# Patient Record
Sex: MALE | Race: WHITE | Employment: OTHER | URBAN - METROPOLITAN AREA
[De-identification: names, ages, dates, MRNs, and addresses within clinical notes are randomized per-mention and may not be internally consistent; named-entity substitution may affect disease eponyms.]

---

## 2017-01-06 ENCOUNTER — HOSPITAL ENCOUNTER (OUTPATIENT)
Dept: PULMONOLOGY | Age: 72
Discharge: HOME | End: 2017-01-06
Attending: INTERNAL MEDICINE | Admitting: INTERNAL MEDICINE

## 2017-01-06 VITALS — HEART RATE: 63 BPM | OXYGEN SATURATION: 96 %

## 2017-01-06 DIAGNOSIS — R06.02 SHORTNESS OF BREATH: ICD-10-CM

## 2017-01-06 RX ORDER — ALBUTEROL SULFATE 90 UG/1
4 AEROSOL, METERED RESPIRATORY (INHALATION) ONCE
Status: COMPLETED | OUTPATIENT
Start: 2017-01-06 | End: 2017-01-06

## 2017-01-06 RX ADMIN — ALBUTEROL SULFATE 4 PUFF: 90 AEROSOL, METERED RESPIRATORY (INHALATION) at 14:58

## 2017-02-09 ENCOUNTER — HOSPITAL ENCOUNTER (OUTPATIENT)
Dept: ENDOSCOPY | Age: 72
Discharge: HOME | End: 2017-02-09
Attending: INTERNAL MEDICINE | Admitting: INTERNAL MEDICINE

## 2017-02-09 RX ORDER — SODIUM CHLORIDE 9 MG/ML
INJECTION, SOLUTION INTRAVENOUS CONTINUOUS
Status: DISCONTINUED | OUTPATIENT
Start: 2017-02-09 | End: 2017-02-10 | Stop reason: HOSPADM

## 2017-02-09 RX ORDER — SODIUM CHLORIDE 0.9 % (FLUSH) 0.9 %
10 SYRINGE (ML) INJECTION PRN
Status: DISCONTINUED | OUTPATIENT
Start: 2017-02-09 | End: 2017-02-10 | Stop reason: HOSPADM

## 2017-02-09 RX ORDER — SODIUM CHLORIDE 0.9 % (FLUSH) 0.9 %
10 SYRINGE (ML) INJECTION EVERY 12 HOURS SCHEDULED
Status: DISCONTINUED | OUTPATIENT
Start: 2017-02-09 | End: 2017-02-10 | Stop reason: HOSPADM

## 2017-04-18 ENCOUNTER — OFFICE VISIT (OUTPATIENT)
Dept: INTERNAL MEDICINE CLINIC | Age: 72
End: 2017-04-18

## 2017-04-18 VITALS
WEIGHT: 240 LBS | RESPIRATION RATE: 16 BRPM | BODY MASS INDEX: 36.37 KG/M2 | HEIGHT: 68 IN | SYSTOLIC BLOOD PRESSURE: 120 MMHG | DIASTOLIC BLOOD PRESSURE: 80 MMHG | HEART RATE: 80 BPM

## 2017-04-18 DIAGNOSIS — E78.5 DYSLIPIDEMIA: ICD-10-CM

## 2017-04-18 DIAGNOSIS — R73.01 IMPAIRED FASTING BLOOD SUGAR: ICD-10-CM

## 2017-04-18 DIAGNOSIS — M19.041 PRIMARY OSTEOARTHRITIS OF BOTH HANDS: ICD-10-CM

## 2017-04-18 DIAGNOSIS — I10 BENIGN ESSENTIAL HTN: Primary | ICD-10-CM

## 2017-04-18 DIAGNOSIS — K21.9 GASTROESOPHAGEAL REFLUX DISEASE WITHOUT ESOPHAGITIS: ICD-10-CM

## 2017-04-18 DIAGNOSIS — M19.042 PRIMARY OSTEOARTHRITIS OF BOTH HANDS: ICD-10-CM

## 2017-04-18 DIAGNOSIS — E88.81 METABOLIC SYNDROME: ICD-10-CM

## 2017-04-18 PROCEDURE — G8417 CALC BMI ABV UP PARAM F/U: HCPCS | Performed by: INTERNAL MEDICINE

## 2017-04-18 PROCEDURE — G8428 CUR MEDS NOT DOCUMENT: HCPCS | Performed by: INTERNAL MEDICINE

## 2017-04-18 PROCEDURE — 1036F TOBACCO NON-USER: CPT | Performed by: INTERNAL MEDICINE

## 2017-04-18 PROCEDURE — 4040F PNEUMOC VAC/ADMIN/RCVD: CPT | Performed by: INTERNAL MEDICINE

## 2017-04-18 PROCEDURE — 1123F ACP DISCUSS/DSCN MKR DOCD: CPT | Performed by: INTERNAL MEDICINE

## 2017-04-18 PROCEDURE — 3017F COLORECTAL CA SCREEN DOC REV: CPT | Performed by: INTERNAL MEDICINE

## 2017-04-18 PROCEDURE — 99214 OFFICE O/P EST MOD 30 MIN: CPT | Performed by: INTERNAL MEDICINE

## 2017-04-19 LAB
A/G RATIO: 1.6 (ref 1.1–2.2)
ALBUMIN SERPL-MCNC: 4.6 G/DL (ref 3.4–5)
ALP BLD-CCNC: 92 U/L (ref 40–129)
ALT SERPL-CCNC: 29 U/L (ref 10–40)
ANION GAP SERPL CALCULATED.3IONS-SCNC: 20 MMOL/L (ref 3–16)
AST SERPL-CCNC: 23 U/L (ref 15–37)
BILIRUB SERPL-MCNC: 0.5 MG/DL (ref 0–1)
BUN BLDV-MCNC: 13 MG/DL (ref 7–20)
CALCIUM SERPL-MCNC: 9.7 MG/DL (ref 8.3–10.6)
CHLORIDE BLD-SCNC: 106 MMOL/L (ref 99–110)
CHOLESTEROL, TOTAL: 200 MG/DL (ref 0–199)
CO2: 21 MMOL/L (ref 21–32)
CREAT SERPL-MCNC: 1 MG/DL (ref 0.8–1.3)
ESTIMATED AVERAGE GLUCOSE: 131.2 MG/DL
GFR AFRICAN AMERICAN: >60
GFR NON-AFRICAN AMERICAN: >60
GLOBULIN: 2.8 G/DL
GLUCOSE BLD-MCNC: 104 MG/DL (ref 70–99)
HBA1C MFR BLD: 6.2 %
HDLC SERPL-MCNC: 54 MG/DL (ref 40–60)
LDL CHOLESTEROL CALCULATED: 103 MG/DL
POTASSIUM SERPL-SCNC: 3.9 MMOL/L (ref 3.5–5.1)
SODIUM BLD-SCNC: 147 MMOL/L (ref 136–145)
TOTAL PROTEIN: 7.4 G/DL (ref 6.4–8.2)
TRIGL SERPL-MCNC: 216 MG/DL (ref 0–150)
TSH SERPL DL<=0.05 MIU/L-ACNC: 1.51 UIU/ML (ref 0.27–4.2)
VLDLC SERPL CALC-MCNC: 43 MG/DL

## 2017-06-02 RX ORDER — PANTOPRAZOLE SODIUM 40 MG/1
40 TABLET, DELAYED RELEASE ORAL DAILY
Qty: 90 TABLET | Refills: 3 | Status: SHIPPED | OUTPATIENT
Start: 2017-06-02 | End: 2017-07-26 | Stop reason: ALTCHOICE

## 2017-07-26 ENCOUNTER — TELEPHONE (OUTPATIENT)
Dept: INTERNAL MEDICINE CLINIC | Age: 72
End: 2017-07-26

## 2017-07-26 RX ORDER — OMEPRAZOLE 40 MG/1
40 CAPSULE, DELAYED RELEASE ORAL DAILY
Qty: 90 CAPSULE | Refills: 3 | Status: SHIPPED | OUTPATIENT
Start: 2017-07-26 | End: 2018-08-17 | Stop reason: SDUPTHER

## 2017-07-26 RX ORDER — OMEPRAZOLE 40 MG/1
40 CAPSULE, DELAYED RELEASE ORAL DAILY
Qty: 90 CAPSULE | Refills: 3 | Status: SHIPPED | OUTPATIENT
Start: 2017-07-26 | End: 2017-07-26 | Stop reason: SDUPTHER

## 2017-09-05 ENCOUNTER — OFFICE VISIT (OUTPATIENT)
Dept: INTERNAL MEDICINE CLINIC | Age: 72
End: 2017-09-05

## 2017-09-05 ENCOUNTER — TELEPHONE (OUTPATIENT)
Dept: INTERNAL MEDICINE CLINIC | Age: 72
End: 2017-09-05

## 2017-09-05 VITALS
HEART RATE: 58 BPM | DIASTOLIC BLOOD PRESSURE: 82 MMHG | WEIGHT: 239.8 LBS | BODY MASS INDEX: 36.46 KG/M2 | SYSTOLIC BLOOD PRESSURE: 113 MMHG

## 2017-09-05 DIAGNOSIS — R10.10 UPPER ABDOMINAL PAIN: ICD-10-CM

## 2017-09-05 DIAGNOSIS — G47.33 OSA (OBSTRUCTIVE SLEEP APNEA): ICD-10-CM

## 2017-09-05 DIAGNOSIS — H26.9 CATARACT OF BOTH EYES, UNSPECIFIED CATARACT TYPE: ICD-10-CM

## 2017-09-05 DIAGNOSIS — E78.5 DYSLIPIDEMIA: ICD-10-CM

## 2017-09-05 DIAGNOSIS — R73.01 IMPAIRED FASTING BLOOD SUGAR: ICD-10-CM

## 2017-09-05 DIAGNOSIS — E88.81 METABOLIC SYNDROME: ICD-10-CM

## 2017-09-05 DIAGNOSIS — Z23 FLU VACCINE NEED: ICD-10-CM

## 2017-09-05 DIAGNOSIS — K21.9 GASTROESOPHAGEAL REFLUX DISEASE WITHOUT ESOPHAGITIS: ICD-10-CM

## 2017-09-05 DIAGNOSIS — I10 BENIGN ESSENTIAL HTN: ICD-10-CM

## 2017-09-05 DIAGNOSIS — Z01.818 PREOP EXAM FOR INTERNAL MEDICINE: Primary | ICD-10-CM

## 2017-09-05 PROBLEM — G47.00 INSOMNIA: Status: ACTIVE | Noted: 2017-09-05

## 2017-09-05 PROBLEM — K85.90 PANCREATITIS: Status: ACTIVE | Noted: 2017-09-05

## 2017-09-05 PROBLEM — K80.20 GALLSTONES: Status: ACTIVE | Noted: 2017-09-05

## 2017-09-05 PROBLEM — E78.00 HIGH CHOLESTEROL: Status: ACTIVE | Noted: 2017-09-05

## 2017-09-05 LAB
A/G RATIO: 1.5 (ref 1.1–2.2)
ALBUMIN SERPL-MCNC: 4.4 G/DL (ref 3.4–5)
ALP BLD-CCNC: 173 U/L (ref 40–129)
ALT SERPL-CCNC: 204 U/L (ref 10–40)
AMYLASE: 98 U/L (ref 25–115)
ANION GAP SERPL CALCULATED.3IONS-SCNC: 17 MMOL/L (ref 3–16)
AST SERPL-CCNC: 281 U/L (ref 15–37)
BASOPHILS ABSOLUTE: 0.1 K/UL (ref 0–0.2)
BASOPHILS RELATIVE PERCENT: 0.6 %
BILIRUB SERPL-MCNC: 1.7 MG/DL (ref 0–1)
BUN BLDV-MCNC: 14 MG/DL (ref 7–20)
CALCIUM SERPL-MCNC: 10 MG/DL (ref 8.3–10.6)
CHLORIDE BLD-SCNC: 105 MMOL/L (ref 99–110)
CHOLESTEROL, TOTAL: 162 MG/DL (ref 0–199)
CO2: 21 MMOL/L (ref 21–32)
CREAT SERPL-MCNC: 1.1 MG/DL (ref 0.8–1.3)
EOSINOPHILS ABSOLUTE: 0.3 K/UL (ref 0–0.6)
EOSINOPHILS RELATIVE PERCENT: 3.2 %
GFR AFRICAN AMERICAN: >60
GFR NON-AFRICAN AMERICAN: >60
GLOBULIN: 2.9 G/DL
GLUCOSE BLD-MCNC: 153 MG/DL (ref 70–99)
HCT VFR BLD CALC: 46.5 % (ref 40.5–52.5)
HDLC SERPL-MCNC: 50 MG/DL (ref 40–60)
HEMOGLOBIN: 15.5 G/DL (ref 13.5–17.5)
LDL CHOLESTEROL CALCULATED: 85 MG/DL
LIPASE: >600 U/L (ref 13–60)
LYMPHOCYTES ABSOLUTE: 2.1 K/UL (ref 1–5.1)
LYMPHOCYTES RELATIVE PERCENT: 22.2 %
MCH RBC QN AUTO: 31.5 PG (ref 26–34)
MCHC RBC AUTO-ENTMCNC: 33.4 G/DL (ref 31–36)
MCV RBC AUTO: 94.4 FL (ref 80–100)
MONOCYTES ABSOLUTE: 0.6 K/UL (ref 0–1.3)
MONOCYTES RELATIVE PERCENT: 6.5 %
NEUTROPHILS ABSOLUTE: 6.3 K/UL (ref 1.7–7.7)
NEUTROPHILS RELATIVE PERCENT: 67.5 %
PDW BLD-RTO: 14.1 % (ref 12.4–15.4)
PLATELET # BLD: 241 K/UL (ref 135–450)
PMV BLD AUTO: 10.3 FL (ref 5–10.5)
POTASSIUM SERPL-SCNC: 4.3 MMOL/L (ref 3.5–5.1)
RBC # BLD: 4.93 M/UL (ref 4.2–5.9)
SODIUM BLD-SCNC: 143 MMOL/L (ref 136–145)
TOTAL PROTEIN: 7.3 G/DL (ref 6.4–8.2)
TRIGL SERPL-MCNC: 137 MG/DL (ref 0–150)
VLDLC SERPL CALC-MCNC: 27 MG/DL
WBC # BLD: 9.4 K/UL (ref 4–11)

## 2017-09-05 PROCEDURE — 99214 OFFICE O/P EST MOD 30 MIN: CPT | Performed by: INTERNAL MEDICINE

## 2017-09-05 PROCEDURE — 90662 IIV NO PRSV INCREASED AG IM: CPT | Performed by: INTERNAL MEDICINE

## 2017-09-05 PROCEDURE — 3017F COLORECTAL CA SCREEN DOC REV: CPT | Performed by: INTERNAL MEDICINE

## 2017-09-05 PROCEDURE — 93000 ELECTROCARDIOGRAM COMPLETE: CPT | Performed by: INTERNAL MEDICINE

## 2017-09-05 PROCEDURE — 1123F ACP DISCUSS/DSCN MKR DOCD: CPT | Performed by: INTERNAL MEDICINE

## 2017-09-05 PROCEDURE — G8427 DOCREV CUR MEDS BY ELIG CLIN: HCPCS | Performed by: INTERNAL MEDICINE

## 2017-09-05 PROCEDURE — G0008 ADMIN INFLUENZA VIRUS VAC: HCPCS | Performed by: INTERNAL MEDICINE

## 2017-09-05 PROCEDURE — 1036F TOBACCO NON-USER: CPT | Performed by: INTERNAL MEDICINE

## 2017-09-05 PROCEDURE — 4040F PNEUMOC VAC/ADMIN/RCVD: CPT | Performed by: INTERNAL MEDICINE

## 2017-09-05 PROCEDURE — G8417 CALC BMI ABV UP PARAM F/U: HCPCS | Performed by: INTERNAL MEDICINE

## 2017-09-06 LAB
ESTIMATED AVERAGE GLUCOSE: 151.3 MG/DL
HBA1C MFR BLD: 6.9 %

## 2017-09-18 ENCOUNTER — OFFICE VISIT (OUTPATIENT)
Dept: INTERNAL MEDICINE CLINIC | Age: 72
End: 2017-09-18

## 2017-09-18 VITALS
RESPIRATION RATE: 16 BRPM | BODY MASS INDEX: 35.28 KG/M2 | DIASTOLIC BLOOD PRESSURE: 79 MMHG | HEART RATE: 60 BPM | WEIGHT: 232 LBS | SYSTOLIC BLOOD PRESSURE: 112 MMHG

## 2017-09-18 DIAGNOSIS — H26.9 CATARACT OF BOTH EYES, UNSPECIFIED CATARACT TYPE: ICD-10-CM

## 2017-09-18 DIAGNOSIS — Z90.49 S/P CHOLECYSTECTOMY: ICD-10-CM

## 2017-09-18 DIAGNOSIS — K85.10 BILIARY ACUTE PANCREATITIS WITHOUT NECROSIS OR INFECTION: Primary | ICD-10-CM

## 2017-09-18 PROCEDURE — 1036F TOBACCO NON-USER: CPT | Performed by: INTERNAL MEDICINE

## 2017-09-18 PROCEDURE — 3017F COLORECTAL CA SCREEN DOC REV: CPT | Performed by: INTERNAL MEDICINE

## 2017-09-18 PROCEDURE — 4040F PNEUMOC VAC/ADMIN/RCVD: CPT | Performed by: INTERNAL MEDICINE

## 2017-09-18 PROCEDURE — 1123F ACP DISCUSS/DSCN MKR DOCD: CPT | Performed by: INTERNAL MEDICINE

## 2017-09-18 PROCEDURE — 99213 OFFICE O/P EST LOW 20 MIN: CPT | Performed by: INTERNAL MEDICINE

## 2017-09-18 PROCEDURE — G8428 CUR MEDS NOT DOCUMENT: HCPCS | Performed by: INTERNAL MEDICINE

## 2017-09-18 PROCEDURE — G8417 CALC BMI ABV UP PARAM F/U: HCPCS | Performed by: INTERNAL MEDICINE

## 2017-09-18 PROCEDURE — 1111F DSCHRG MED/CURRENT MED MERGE: CPT | Performed by: INTERNAL MEDICINE

## 2017-09-18 RX ORDER — HYDROCODONE BITARTRATE AND ACETAMINOPHEN 5; 325 MG/1; MG/1
1 TABLET ORAL EVERY 6 HOURS PRN
COMMUNITY
End: 2018-02-09 | Stop reason: ALTCHOICE

## 2017-09-20 ENCOUNTER — OFFICE VISIT (OUTPATIENT)
Dept: SURGERY | Age: 72
End: 2017-09-20

## 2017-09-20 VITALS — SYSTOLIC BLOOD PRESSURE: 122 MMHG | DIASTOLIC BLOOD PRESSURE: 78 MMHG

## 2017-09-20 DIAGNOSIS — K80.20 GALLSTONES: Primary | ICD-10-CM

## 2017-09-20 PROCEDURE — 99024 POSTOP FOLLOW-UP VISIT: CPT | Performed by: SURGERY

## 2017-12-06 ENCOUNTER — OFFICE VISIT (OUTPATIENT)
Dept: INTERNAL MEDICINE CLINIC | Age: 72
End: 2017-12-06

## 2017-12-06 VITALS
BODY MASS INDEX: 35.77 KG/M2 | HEART RATE: 52 BPM | DIASTOLIC BLOOD PRESSURE: 70 MMHG | HEIGHT: 68 IN | RESPIRATION RATE: 16 BRPM | SYSTOLIC BLOOD PRESSURE: 130 MMHG | WEIGHT: 236 LBS

## 2017-12-06 DIAGNOSIS — L29.9 ITCHING: ICD-10-CM

## 2017-12-06 DIAGNOSIS — E78.5 DYSLIPIDEMIA: ICD-10-CM

## 2017-12-06 DIAGNOSIS — E88.81 METABOLIC SYNDROME: ICD-10-CM

## 2017-12-06 DIAGNOSIS — R73.01 IMPAIRED FASTING BLOOD SUGAR: ICD-10-CM

## 2017-12-06 DIAGNOSIS — L98.9 SKIN LESION OF RIGHT LEG: ICD-10-CM

## 2017-12-06 DIAGNOSIS — K21.9 GASTROESOPHAGEAL REFLUX DISEASE WITHOUT ESOPHAGITIS: ICD-10-CM

## 2017-12-06 DIAGNOSIS — Z12.5 SCREENING FOR PROSTATE CANCER: ICD-10-CM

## 2017-12-06 DIAGNOSIS — I10 BENIGN ESSENTIAL HTN: Primary | ICD-10-CM

## 2017-12-06 DIAGNOSIS — Z23 NEED FOR TDAP VACCINATION: ICD-10-CM

## 2017-12-06 PROBLEM — K80.20 GALLSTONES: Status: RESOLVED | Noted: 2017-09-05 | Resolved: 2017-12-06

## 2017-12-06 PROBLEM — K85.90 PANCREATITIS: Status: RESOLVED | Noted: 2017-09-05 | Resolved: 2017-12-06

## 2017-12-06 LAB
A/G RATIO: 1.5 (ref 1.1–2.2)
ALBUMIN SERPL-MCNC: 4.4 G/DL (ref 3.4–5)
ALP BLD-CCNC: 98 U/L (ref 40–129)
ALT SERPL-CCNC: 42 U/L (ref 10–40)
ANION GAP SERPL CALCULATED.3IONS-SCNC: 15 MMOL/L (ref 3–16)
AST SERPL-CCNC: 39 U/L (ref 15–37)
BILIRUB SERPL-MCNC: 0.5 MG/DL (ref 0–1)
BUN BLDV-MCNC: 13 MG/DL (ref 7–20)
CALCIUM SERPL-MCNC: 10 MG/DL (ref 8.3–10.6)
CHLORIDE BLD-SCNC: 105 MMOL/L (ref 99–110)
CHOLESTEROL, TOTAL: 168 MG/DL (ref 0–199)
CO2: 24 MMOL/L (ref 21–32)
CREAT SERPL-MCNC: 1 MG/DL (ref 0.8–1.3)
GFR AFRICAN AMERICAN: >60
GFR NON-AFRICAN AMERICAN: >60
GLOBULIN: 3 G/DL
GLUCOSE BLD-MCNC: 109 MG/DL (ref 70–99)
HDLC SERPL-MCNC: 56 MG/DL (ref 40–60)
LDL CHOLESTEROL CALCULATED: 78 MG/DL
POTASSIUM SERPL-SCNC: 4.8 MMOL/L (ref 3.5–5.1)
PROSTATE SPECIFIC ANTIGEN: 1.47 NG/ML (ref 0–4)
SODIUM BLD-SCNC: 144 MMOL/L (ref 136–145)
TOTAL PROTEIN: 7.4 G/DL (ref 6.4–8.2)
TRIGL SERPL-MCNC: 169 MG/DL (ref 0–150)
VLDLC SERPL CALC-MCNC: 34 MG/DL

## 2017-12-06 PROCEDURE — 3017F COLORECTAL CA SCREEN DOC REV: CPT | Performed by: INTERNAL MEDICINE

## 2017-12-06 PROCEDURE — G8417 CALC BMI ABV UP PARAM F/U: HCPCS | Performed by: INTERNAL MEDICINE

## 2017-12-06 PROCEDURE — G8484 FLU IMMUNIZE NO ADMIN: HCPCS | Performed by: INTERNAL MEDICINE

## 2017-12-06 PROCEDURE — 1123F ACP DISCUSS/DSCN MKR DOCD: CPT | Performed by: INTERNAL MEDICINE

## 2017-12-06 PROCEDURE — 4040F PNEUMOC VAC/ADMIN/RCVD: CPT | Performed by: INTERNAL MEDICINE

## 2017-12-06 PROCEDURE — 1036F TOBACCO NON-USER: CPT | Performed by: INTERNAL MEDICINE

## 2017-12-06 PROCEDURE — 99214 OFFICE O/P EST MOD 30 MIN: CPT | Performed by: INTERNAL MEDICINE

## 2017-12-06 PROCEDURE — G8428 CUR MEDS NOT DOCUMENT: HCPCS | Performed by: INTERNAL MEDICINE

## 2017-12-06 RX ORDER — EZETIMIBE 10 MG/1
10 TABLET ORAL DAILY
Qty: 90 TABLET | Refills: 3 | Status: SHIPPED | OUTPATIENT
Start: 2017-12-06 | End: 2018-11-29 | Stop reason: SDUPTHER

## 2017-12-06 RX ORDER — AMLODIPINE BESYLATE 5 MG/1
5 TABLET ORAL DAILY
Qty: 90 TABLET | Refills: 3 | Status: SHIPPED | OUTPATIENT
Start: 2017-12-06 | End: 2018-11-29 | Stop reason: SDUPTHER

## 2017-12-06 RX ORDER — LOSARTAN POTASSIUM 100 MG/1
100 TABLET ORAL DAILY
Qty: 90 TABLET | Refills: 3 | Status: SHIPPED | OUTPATIENT
Start: 2017-12-06 | End: 2018-11-29 | Stop reason: SDUPTHER

## 2017-12-06 RX ORDER — PRAVASTATIN SODIUM 80 MG/1
80 TABLET ORAL EVERY EVENING
Qty: 90 TABLET | Refills: 3 | Status: SHIPPED | OUTPATIENT
Start: 2017-12-06 | End: 2018-11-29 | Stop reason: SDUPTHER

## 2017-12-06 NOTE — PROGRESS NOTES
Reviewed OTC Meds? Yes  Patient understands current medications? Yes  Any resources/referrals provided to patient? no  Any tools provided to patient?   no

## 2017-12-06 NOTE — PROGRESS NOTES
[facility]) Physicians  Internal Medicine  Patient Encounter  Gianluca Trimble D.O., [facility]        Chief Complaint   Patient presents with   Nguyễn Patterson    Medication Check     HPI  Patient ID: Bri Ortiz is a 74 y.o. male seen today for follow up regarding the status of his current chronic medical problems below along with medication review. Patient is status post laparoscopic cholecystectomy after about of acute gallstone pancreatitis in September 2017. Patient is been feeling well and offers no new concerns other than itching as noted below. HTN--  Patient continues on Norvasc 5 mg daily, losartan 100 mg daily. He denies any adverse side effects. He specifically denies any new headaches or visual disturbances. He denies any itchiness, lightheadedness, or syncopal episodes. Hyperlipidemia--   Lab Results   Component Value Date    LDLCALC 85 09/05/2017     Patient remains on pravastatin and Zetia. He denies any new development of myalgias, unexplained muscle weakness or muscle cramping. IFG/Metabolic Syndrome-- based on his last A1c when he presented to the hospital his sugar levels were consistent with diabetes. Patient is due for repeat lab testing. He denies any new development of polyuria, polydipsia. He has lost a little weight with some diet changes. He is eating less bread. He is trying to exercise daily. Lab Results   Component Value Date    LABA1C 6.9 09/05/2017       Lab Results   Component Value Date    .3 09/05/2017     GERD-- He continues on Prilosec. He states h2 blockers are not effective. He denies heart burn or dysphagia.     Lab Results   Component Value Date    MG 2.10 12/19/2016        Past Medical History:   Diagnosis Date    BPH     Colon polyps     DDD (degenerative disc disease), lumbar     Diverticulosis     Gallstone pancreatitis 09/05/2017    GERD (gastroesophageal reflux disease)     Hyperlipidemia     Hypertension     Impaired fasting blood sugar     Kidney stones 11/10    Lumbar spondylosis     OAB (overactive bladder)     Osteoarthritis     Osteoarthritis of right knee     Pneumonia     pneumonia    RBBB 12/19/2016    Renal cyst     Retinal vasculitis     Retinal vasculitis     Right ureteral stone        Review of Systems--   SKIN:  He had seen his derm's CNP. He was seen 6-8 months. He reports that he has itching on his legs. He denies rash. He is requesting derm referral.    :  He has appointment with Uro. Due for PSA. Physical Exam  /70   Pulse 52   Resp 16   Ht 5' 8\" (1.727 m)   Wt 236 lb (107 kg)   BMI 35.88 kg/m²     Wt Readings from Last 3 Encounters:   12/06/17 236 lb (107 kg)   09/18/17 232 lb (105.2 kg)   09/08/17 247 lb 9.6 oz (112.3 kg)       GEN: A&O, NAD, Obese  HEENT: NUBIA, EOMI, oral cavity clear, throat NL, TM's NL. Mucous membranes are moist without lesions. Tongue midline. NECK: Neck supple. No thyromegaly or thyroid nodules. No soft tissue masses noted in the neck. No JVD. Trachea midline. LYMPH:  No C/SC nodes  CV: Regular rhythm. Rate normal.  No murmurs. No ectopy. VASC: No carotid bruits. Pedal pulses symmetrical.    PULM: CTA. GI:  Abd Soft. NT,  No Masses. Obese    EXT: Trace pitting edema-- No worse  SKIN: No rashes.  + Areas of excoriation BL LE. No evidence of erythematous papules, vesicles, bullae. Some scarring noted from previous excoriation        ASSESSMENT/PLAN:    1. Benign essential HTN  Condition is fairly well-controlled  Continue current medications  Refills provided  Continue no added salt diet-- literature provided on the DASH diet  Continue calorie restricted diet along with ongoing cardiovascular exercise  - amLODIPine (NORVASC) 5 MG tablet; Take 1 tablet by mouth daily  Dispense: 90 tablet; Refill: 3  - losartan (COZAAR) 100 MG tablet; Take 1 tablet by mouth daily  Dispense: 90 tablet; Refill: 3    2.  Metabolic syndrome  Condition stability is

## 2017-12-06 NOTE — PATIENT INSTRUCTIONS
especially important for health care professionals and anyone having close contact with a baby younger than 12 months. Pregnant women should get a dose of Tdap during every pregnancy, to protect the  from pertussis. Infants are most at risk for severe, life-threatening complications from pertussis. Another vaccine, called Td, protects against tetanus and diphtheria, but not pertussis. A Td booster should be given every 10 years. Tdap may be given as one of these boosters if you have never gotten Tdap before. Tdap may also be given after a severe cut or burn to prevent tetanus infection. Your doctor or the person giving you the vaccine can give you more information. Tdap may safely be given at the same time as other vaccines. Some people should not get this vaccine  · A person who has ever had a life-threatening allergic reaction after a previous dose of any diphtheria-, tetanus-, or pertussis-containing vaccine, OR has a severe allergy to any part of this vaccine, should not get Tdap vaccine. Tell the person giving the vaccine about any severe allergies. · Anyone who had coma or long repeated seizures within 7 days after a childhood dose of DTP or DTaP, or a previous dose of Tdap, should not get Tdap, unless a cause other than the vaccine was found. They can still get Td. · Talk to your doctor if you:  ¨ Have seizures or another nervous system problem. ¨ Had severe pain or swelling after any vaccine containing diphtheria, tetanus, or pertussis. ¨ Ever had a condition called Guillain-Barré Syndrome (GBS). ¨ Aren't feeling well on the day the shot is scheduled. Risks  With any medicine, including vaccines, there is a chance of side effects. These are usually mild and go away on their own. Serious reactions are also possible but are rare. Most people who get Tdap vaccine do not have any problems with it.   Mild problems following Tdap  (Did not interfere with activities)  · Pain where the shot was causing a serious injury or death. The safety of vaccines is always being monitored. For more information, visit: www.cdc.gov/vaccinesafety. What if there is a serious problem? What should I look for? · Look for anything that concerns you, such as signs of a severe allergic reaction, very high fever, or unusual behavior. Signs of a severe allergic reaction can include hives, swelling of the face and throat, difficulty breathing, a fast heartbeat, dizziness, and weakness. These would usually start a few minutes to a few hours after the vaccination. What should I do? · If you think it is a severe allergic reaction or other emergency that can't wait, call 9-1-1 or get the person to the nearest hospital. Otherwise, call your doctor. · Afterward, the reaction should be reported to the Vaccine Adverse Event Reporting System (VAERS). Your doctor might file this report, or you can do it yourself through the VAERS web site at www.vaers. hhs.gov, or by calling 3-326.346.5304. VAERS does not give medical advice. The National Vaccine Injury Compensation Program  The National Vaccine Injury Compensation Program (VICP) is a federal program that was created to compensate people who may have been injured by certain vaccines. Persons who believe they may have been injured by a vaccine can learn about the program and about filing a claim by calling 6-169.697.6379 or visiting the [employer] website at www.[facility].gov/vaccinecompensation. There is a time limit to file a claim for compensation. How can I learn more? · Ask your doctor. He or she can give you the vaccine package insert or suggest other sources of information. · Call your local or state health department. · Contact the Centers for Disease Control and Prevention (CDC):  ¨ Call 3-421.416.1760 (2-498-TAQ-INFO) or  ¨ Visit CDC's website at www.cdc.gov/vaccines  Vaccine Information Statement (Interim)  Tdap Vaccine  (2/24/15)  [id number]  [facility] and have a digital rectal exam. The digital (finger) rectal exam checks for anything abnormal in your prostate. To do the exam, the doctor puts a lubricated, gloved finger into your rectum. If these tests suggest cancer, you may need a prostate biopsy. How is prostate cancer diagnosed? In a biopsy, the doctor takes small tissue samples from your prostate gland. Another doctor then looks at the tissue under a microscope to see if there are cancer cells, signs of infection, or other problems. The results help diagnose prostate cancer. What are the pros and cons of screening? Neither a PSA test nor a digital rectal exam can tell you for sure that you do or do not have cancer. But they can help you decide if you need more tests, such as a prostate biopsy. Screening tests may be useful because most men with prostate cancer don't have symptoms. It can be hard to know if you have cancer until it is more advanced. And then it's harder to treat. But having a PSA test can also cause harm. The test may show high levels of PSA that aren't caused by cancer. So you could have a prostate biopsy you didn't need. Or the PSA test might be normal when there is cancer, so a cancer might not be found early. The test can also find cancers that would never have caused a problem during your lifetime. So you might have treatment that was not needed. Prostate cancer usually develops late in life and grows slowly. For many men, it does not shorten their lives. Some experts advise screening only for men who are at high risk. Talk with your doctor to see if screening is right for you. Where can you learn more? Go to https://ZinMobicarlynTeach 'n Go.EMOSpeech. org and sign in to your [employer] account. Enter R550 in the [employer] box to learn more about \"Prostate Cancer Screening: Care Instructions. \"     If you do not have an account, please click on the \"Sign Up Now\" link. Current as of:  May 12, 2017  Content Version: 11.4  © 0246-6867 Healthwise, Incorporated. Care instructions adapted under license by [facility] ([facility]). If you have questions about a medical condition or this instruction, always ask your healthcare professional. [address] any warranty or liability for your use of this information. Patient Education        DASH Diet: Care Instructions  Your Care Instructions    The DASH diet is an eating plan that can help lower your blood pressure. DASH stands for Dietary Approaches to Stop Hypertension. Hypertension is high blood pressure. The DASH diet focuses on eating foods that are high in calcium, potassium, and magnesium. These nutrients can lower blood pressure. The foods that are highest in these nutrients are fruits, vegetables, low-fat dairy products, nuts, seeds, and legumes. But taking calcium, potassium, and magnesium supplements instead of eating foods that are high in those nutrients does not have the same effect. The DASH diet also includes whole grains, fish, and poultry. The DASH diet is one of several lifestyle changes your doctor may recommend to lower your high blood pressure. Your doctor may also want you to decrease the amount of sodium in your diet. Lowering sodium while following the DASH diet can lower blood pressure even further than just the DASH diet alone. Follow-up care is a key part of your treatment and safety. Be sure to make and go to all appointments, and call your doctor if you are having problems. It's also a good idea to know your test results and keep a list of the medicines you take. How can you care for yourself at home? Following the DASH diet  · Eat 4 to 5 servings of fruit each day. A serving is 1 medium-sized piece of fruit, ½ cup chopped or canned fruit, 1/4 cup dried fruit, or 4 ounces (½ cup) of fruit juice. Choose fruit more often than fruit juice. · Eat 4 to 5 servings of vegetables each day.  A serving is 1 cup of lettuce or raw leafy vegetables, ½ cup

## 2017-12-07 LAB
ESTIMATED AVERAGE GLUCOSE: 145.6 MG/DL
HBA1C MFR BLD: 6.7 %

## 2017-12-08 ENCOUNTER — OFFICE VISIT (OUTPATIENT)
Dept: DERMATOLOGY | Age: 72
End: 2017-12-08

## 2017-12-08 DIAGNOSIS — L85.3 XEROSIS OF SKIN: ICD-10-CM

## 2017-12-08 DIAGNOSIS — L30.9 ECZEMA, UNSPECIFIED TYPE: Primary | ICD-10-CM

## 2017-12-08 DIAGNOSIS — Z87.891 FORMER SMOKER: ICD-10-CM

## 2017-12-08 DIAGNOSIS — L21.9 SEBORRHEIC DERMATITIS OF SCALP: ICD-10-CM

## 2017-12-08 PROCEDURE — 99202 OFFICE O/P NEW SF 15 MIN: CPT | Performed by: DERMATOLOGY

## 2017-12-08 NOTE — Clinical Note
Dear Dr. Alan Huffman,  I had the pleasure of seeing your patient, Yajaira Miller, in my office recently. Thanks so much for involving me in his care. Please see my note and call me if you have any questions.   Best regards, Charlotte Womack, DO

## 2017-12-08 NOTE — PROGRESS NOTES
 Kidney stones 11/10    Lumbar spondylosis     OAB (overactive bladder)     Osteoarthritis     Osteoarthritis of right knee     Pneumonia     pneumonia    RBBB 12/19/2016    Renal cyst     Retinal vasculitis     Retinal vasculitis     Right ureteral stone     Type 2 diabetes mellitus without complication, without long-term current use of insulin ([facility])     Type 2 diabetes mellitus without complication, without long-term current use of insulin ([facility] [address]      Past Surgical History:   Procedure Laterality Date    APPENDECTOMY      BONE RESECTION Left     bone spur removal left elbow    CARDIAC CATHETERIZATION  3/23/2012    CATARACT REMOVAL WITH IMPLANT Left 11/01/2017    CATARACT REMOVAL WITH IMPLANT Right 10/2017    CHOLECYSTECTOMY, LAPAROSCOPIC  09/07/2017    COLONOSCOPY  7/2008    multiple    CYSTOSCOPY  12/2/2014    Retrograde Pyelogram, stent, Dr. Britton Ocasio      left hip replacement    RIGHT COLECTOMY  3/28/2012    laparoscopic    TONSILLECTOMY         No Known Allergies  Outpatient Prescriptions Marked as Taking for the 12/8/17 encounter (Office Visit) with [facility], DO   Medication Sig Dispense Refill    triamcinolone (KENALOG) 0.1 % ointment Apply topically 2 times daily to scaly affected areas only x 2 weeks 80 g 1    amLODIPine (NORVASC) 5 MG tablet Take 1 tablet by mouth daily 90 tablet 3    losartan (COZAAR) 100 MG tablet Take 1 tablet by mouth daily 90 tablet 3    ezetimibe (ZETIA) 10 MG tablet Take 1 tablet by mouth daily 90 tablet 3    pravastatin (PRAVACHOL) 80 MG tablet Take 1 tablet by mouth every evening 90 tablet 3    HYDROcodone-acetaminophen (NORCO) 5-325 MG per tablet Take 1 tablet by mouth every 6 hours as needed for Pain .       folic acid (FOLVITE) 987 MCG tablet Take 400 mcg by mouth daily      omeprazole (PRILOSEC) 40 MG delayed release capsule Take 1 capsule by mouth daily 90 capsule 3    aspirin 81 MG tablet Take 81 mg by mouth daily      Cholecalciferol (VITAMIN D) 2000 UNITS CAPS capsule Take  by mouth.  mometasone (NASONEX) 50 MCG/ACT nasal spray 2 sprays by Nasal route daily. 3 Inhaler 3    Glucosamine HCl-MSM (GLUCOSAMINE-MSM PO) Take 1 tablet by mouth 2 times daily          Social History:   Social History     Social History    Marital status:      Spouse name: Иван Disla Number of children: 3    Years of education: 12     Occupational History    Not on file. Social History Main Topics    Smoking status: Former Smoker     Years: 16.00     Quit date: 5/16/1977    Smokeless tobacco: Never Used      Comment: 1974    Alcohol use 1.0 oz/week     2 Standard drinks or equivalent per week      Comment: ONCE A WEEK    Drug use: No    Sexual activity: Not on file     Other Topics Concern    Not on file     Social History Narrative    No narrative on file       Physical Examination     The following were examined and determined to be normal: Psych/Neuro, Neck and Abdomen, nail/digits  The following were examined and determined to be abnormal: Scalp/hair, Back, RUE, LUE, RLE and LLE. -General: NAD, well-nourished, well-developed. Areas of skin examined as listed above:   1. erythematous scaly papules coalescing into plaques located on bilateral pretibial areas of lower extremities  2. diffuse dry, dull, rough skin with fine bran-like scale that flakes off easily located on trunk and bilateral extremities  3. Scalp-diffuse fine white scale, minimal erythema  Assessment and Plan     1. Eczema, unspecified type    2. Xerosis of skin    3. Seborrheic dermatitis of scalp    4. Former smoker        1. Eczema, unspecified type  -Pt was educated re: chronicity, use of topical steroids for flares, importance of dry skin care regimen  Apply triamcinolone b.i.d. ×2 weeks to affected areas with scale only  - triamcinolone (KENALOG) 0.1 % ointment;  Apply topically 2 times daily to scaly affected areas only x 2

## 2017-12-08 NOTE — PATIENT INSTRUCTIONS
DRY SKIN CARE    1. Do not take more than 1 shower or bath each day. Try to spend 10 minutes or less in the shower/bath. 2. Use a moisturizing soap such as Dove, Oil of Olay or Cetaphil. Antibacterial soaps can be too drying. 3. Use soap only on limited areas (face, underarms, groin). Try to avoid using soap on the arms, legs, trunk and back. 4. After showering, pat dry with a towel and generously apply a thick moisturizer all over. 5. If you are able, apply the moisturizer a second time during the day as well. 6. If a prescription cream or ointment has been ordered for you, apply the prescription medication to affected areas after your bath/shower while the skin is still damp, then apply the moisturizer as above. 7. When it comes to moisturizers, the thicker the better. Ointments (such as vaseline) are thicker than creams, and creams are thicker than lotions. Suggested over-the-counter moisturizer:      CeraVe Moisturizing Cream in a jar/tub. Apply at least twice a day. Only CeraVe contains the three essential ceramides healthy skin needs. CeraVe Facial Moisturizing Lotion with SPF 30 is great for the face and they make a night time cream without SPF in it as well       You can stop Sarna if you want but you can also use along with CeraVe along with Sarna     Apply Triamcinolone ointment twice a day to the rough areas on skin, do not use in the groin area.  Apply after you moisturize     Selsun blue anti dandruff, lather and let sit for 5 mins

## 2018-02-06 ENCOUNTER — TELEPHONE (OUTPATIENT)
Dept: INTERNAL MEDICINE CLINIC | Age: 73
End: 2018-02-06

## 2018-02-06 NOTE — TELEPHONE ENCOUNTER
Pt seen in ER a few weeks ago for having blood in his urine. He states he was referred to urology. Pt had some tests done at urology group and was told he was fine. Pt is worried that he may have had a stone that he passed or did he have a UTI?  Pt states his stream was  a showering spray instead of his normal stream.  Pt is requesting a call back from  to explain if possible

## 2018-02-06 NOTE — TELEPHONE ENCOUNTER
Patient seen by [facility] in [facility] 2 weeks ago and he stated they did a bunch of testing and was told that there was nothing wrong with him. He was in the ER at [facility] and was told he had a UTI. He is concerned since the [facility] stated there was nothing wrong. Would you please call the patient to discuss.

## 2018-02-09 ENCOUNTER — OFFICE VISIT (OUTPATIENT)
Dept: INTERNAL MEDICINE CLINIC | Age: 73
End: 2018-02-09

## 2018-02-09 VITALS
BODY MASS INDEX: 35.43 KG/M2 | SYSTOLIC BLOOD PRESSURE: 120 MMHG | DIASTOLIC BLOOD PRESSURE: 68 MMHG | RESPIRATION RATE: 16 BRPM | HEART RATE: 74 BPM | WEIGHT: 233 LBS

## 2018-02-09 DIAGNOSIS — N30.01 ACUTE CYSTITIS WITH HEMATURIA: Primary | ICD-10-CM

## 2018-02-09 DIAGNOSIS — R31.0 GROSS HEMATURIA: ICD-10-CM

## 2018-02-09 DIAGNOSIS — N20.0 KIDNEY STONES: ICD-10-CM

## 2018-02-09 DIAGNOSIS — N40.0 BENIGN PROSTATIC HYPERPLASIA, UNSPECIFIED WHETHER LOWER URINARY TRACT SYMPTOMS PRESENT: ICD-10-CM

## 2018-02-09 PROCEDURE — 4040F PNEUMOC VAC/ADMIN/RCVD: CPT | Performed by: INTERNAL MEDICINE

## 2018-02-09 PROCEDURE — 3017F COLORECTAL CA SCREEN DOC REV: CPT | Performed by: INTERNAL MEDICINE

## 2018-02-09 PROCEDURE — G8417 CALC BMI ABV UP PARAM F/U: HCPCS | Performed by: INTERNAL MEDICINE

## 2018-02-09 PROCEDURE — 1123F ACP DISCUSS/DSCN MKR DOCD: CPT | Performed by: INTERNAL MEDICINE

## 2018-02-09 PROCEDURE — 1036F TOBACCO NON-USER: CPT | Performed by: INTERNAL MEDICINE

## 2018-02-09 PROCEDURE — 99214 OFFICE O/P EST MOD 30 MIN: CPT | Performed by: INTERNAL MEDICINE

## 2018-02-09 PROCEDURE — 1111F DSCHRG MED/CURRENT MED MERGE: CPT | Performed by: INTERNAL MEDICINE

## 2018-02-09 PROCEDURE — G8484 FLU IMMUNIZE NO ADMIN: HCPCS | Performed by: INTERNAL MEDICINE

## 2018-02-09 PROCEDURE — G8427 DOCREV CUR MEDS BY ELIG CLIN: HCPCS | Performed by: INTERNAL MEDICINE

## 2018-02-09 RX ORDER — TAMSULOSIN HYDROCHLORIDE 0.4 MG/1
0.4 CAPSULE ORAL DAILY
Qty: 90 CAPSULE | Refills: 3 | Status: SHIPPED | OUTPATIENT
Start: 2018-02-09 | End: 2020-09-16 | Stop reason: DRUGHIGH

## 2018-02-09 RX ORDER — TAMSULOSIN HYDROCHLORIDE 0.4 MG/1
0.4 CAPSULE ORAL DAILY
Qty: 5 CAPSULE | Refills: 0 | Status: SHIPPED | OUTPATIENT
Start: 2018-02-09 | End: 2018-02-09 | Stop reason: SDUPTHER

## 2018-02-09 NOTE — PATIENT INSTRUCTIONS
Patient Education        Benign Prostatic Hyperplasia: Care Instructions  Your Care Instructions    Benign prostatic hyperplasia, or BPH, is an enlarged prostate gland. The prostate is a small gland that makes some of the fluid in semen. Prostate enlargement happens to almost all men as they age. It is usually not serious. BPH does not cause prostate cancer. As the prostate gets bigger, it may partly block the flow of urine. You may have a hard time getting a urine stream started or completely stopped. BPH can cause dribbling. You may have a weak urine stream, or you may have to urinate more often than you used to, especially at night. Most men find these problems easy to manage. You do not need treatment unless your symptoms bother you a lot or you have other problems, such as bladder infections or stones. In these cases, medicines may help. Surgery is not needed unless the urine flow is blocked or the symptoms do not get better with medicine. Follow-up care is a key part of your treatment and safety. Be sure to make and go to all appointments, and call your doctor if you are having problems. It's also a good idea to know your test results and keep a list of the medicines you take. How can you care for yourself at home? · Take plenty of time to urinate. Try to relax. · Try \"double voiding. \" Urinate as much you can, relax for a few moments, and then try to urinate again. · Sit on the toilet to urinate. · Read or think of other things while you are waiting. · Turn on a faucet, or try to picture running water. Some men find that this helps get their urine flowing. · If dribbling is a problem, wash your penis daily to avoid skin irritation and infection. · Avoid caffeine and alcohol. These drinks will increase how often you need to urinate. Spread your fluid intake throughout the day. If the urge to urinate often wakes you at night, limit your fluid intake in the evening.  Urinate right before you go to urine. ? Watch closely for changes in your health, and be sure to contact your doctor if:  ? · You have new urination problems. ? · You do not get better as expected. Where can you learn more? Go to https://juju.Hyperpot. org and sign in to your Thanx account. Enter V814 in the Divided box to learn more about \"Blood in the Urine: Care Instructions. \"     If you do not have an account, please click on the \"Sign Up Now\" link. Current as of: May 12, 2017  Content Version: 11.5  © 7182-8235 [employer]. Care instructions adapted under license by [facility] ([facility]). If you have questions about a medical condition or this instruction, always ask your healthcare professional. [address] any warranty or liability for your use of this information. Patient Education        Kidney Stone: Care Instructions  Your Care Instructions    Kidney stones are formed when salts, minerals, and other substances normally found in the urine clump together. They can be as small as grains of sand or, rarely, as large as golf balls. While the stone is traveling through the ureter, which is the tube that carries urine from the kidney to the bladder, you will probably feel pain. The pain may be mild or very severe. You may also have some blood in your urine. As soon as the stone reaches the bladder, any intense pain should go away. If a stone is too large to pass on its own, you may need a medical procedure to help you pass the stone. The doctor has checked you carefully, but problems can develop later. If you notice any problems or new symptoms, get medical treatment right away. Follow-up care is a key part of your treatment and safety. Be sure to make and go to all appointments, and call your doctor if you are having problems. It's also a good idea to know your test results and keep a list of the medicines you take. How can you care for yourself at home?   · Drink plenty of fluids, enough so that your urine is light yellow or clear like water. If you have kidney, heart, or liver disease and have to limit fluids, talk with your doctor before you increase the amount of fluids you drink. · Take pain medicines exactly as directed. Call your doctor if you think you are having a problem with your medicine. ¨ If the doctor gave you a prescription medicine for pain, take it as prescribed. ¨ If you are not taking a prescription pain medicine, ask your doctor if you can take an over-the-counter medicine. Read and follow all instructions on the label. · Your doctor may ask you to strain your urine so that you can collect your kidney stone when it passes. You can use a kitchen strainer or a tea strainer to catch the stone. Store it in a plastic bag until you see your doctor again. Preventing future kidney stones  Some changes in your diet may help prevent kidney stones. Depending on the cause of your stones, your doctor may recommend that you:  · Drink plenty of fluids, enough so that your urine is light yellow or clear like water. If you have kidney, heart, or liver disease and have to limit fluids, talk with your doctor before you increase the amount of fluids you drink. · Limit coffee, tea, and alcohol. Also avoid grapefruit juice. · Do not take more than the recommended daily dose of vitamins C and D.  · Avoid antacids such as Gaviscon, Maalox, Mylanta, or Tums. · Limit the amount of salt (sodium) in your diet. · Eat a balanced diet that is not too high in protein. · Limit foods that are high in a substance called oxalate, which can cause kidney stones. These foods include dark green vegetables, rhubarb, chocolate, wheat bran, nuts, cranberries, and beans. When should you call for help? Call your doctor now or seek immediate medical care if:  ? · You cannot keep down fluids. ? · Your pain gets worse. ? · You have a fever or chills.    ? · You have new or worse pain in

## 2018-02-09 NOTE — PROGRESS NOTES
[facility]) Physicians  Internal Medicine  Patient Encounter  Melissa Gutierrez D.O., [facility]        Chief Complaint   Patient presents with    Follow-Up from [facility]       HPI: 74 y.o. male seen today status post ER visit on 1/21/2018. He presented to [facility] with complaint of urinary urgency and gross hematuria. Patient does have a known history of kidney stones. The ER report was reviewed in the electronic medical record. He was hemodynamically stable. Urinalysis showed red colored urine with large blood, large leukocyte esterase, greater than 300 protein. Greater than 100 red blood cells per high-powered field noted. Urine culture revealed E. Coli that was sensitive to most antibiotics. She was sent home from the emergency department on Cipro and Flomax. He has since been seen by the urologist, Dr. Cherelle Winter. He was actually evaluated the next day. Pt states he got better. His stream remains weak. He had no pain. He also denies dysuria though the ER report indicated her did have dysuria. He did pass a blood clot. CT scan in 9/2017-- BL Nephrolithiasis. He denies hematuria.       Past Medical History:   Diagnosis Date    BPH     Colon polyps     DDD (degenerative disc disease), lumbar     Diverticulosis     Gallstone pancreatitis 09/05/2017    GERD (gastroesophageal reflux disease)     Hyperlipidemia     Hypertension     Impaired fasting blood sugar     Kidney stones 11/10    Lumbar spondylosis     OAB (overactive bladder)     Osteoarthritis     Osteoarthritis of right knee     Pneumonia     pneumonia    RBBB 12/19/2016    Renal cyst     Retinal vasculitis     Retinal vasculitis     Right ureteral stone     Type 2 diabetes mellitus without complication, without long-term current use of insulin (HCC)     Type 2 diabetes mellitus without complication, without long-term current use of insulin (HCC)        Review of Systems - As per HPI      OBJECTIVE:  /68   Pulse 74   Resp 16   Wt 233 lb (105.7 kg)   BMI 35.43 kg/m²   GEN: NAD, A&O, Non-toxic  HEENT: NC/AT, DAVION, EOMI, Oral cavity Clear,  TM's NL, Nasal cavity clear. NECK: Supple. No thyromegaly. LYMPH: No C/SC nodes. CV: S1 S2 NL, RRR. No murmurs, clicks or rubs. PULM: CTA, symmentric air exchange  EXT: No C/C/E  GI: Abdomen is soft, NT, BS+  NEURO: No focal or lateralizing deficits. VASC:  No carotid bruits. Pulses symmetric    ASSESSMENT[de-identified]  Garlan Rinne was seen today for follow-up from hospital.    Diagnoses and all orders for this visit:    Acute cystitis with hematuria  -     [address] LIST  -     Alphonse Verma MD (GIANFRANCO)    Kidney stones  -     SD DISCHARGE MEDS RECONCILED W/ CURRENT OUTPATIENT MED ALBA Verma MD (GIANFRANCO)    Gross hematuria  -     SD DISCHARGE MEDS RECONCILED W/ CURRENT OUTPATIENT MED ALBA  -     Alphonse Verma MD (GIANFRANCO)    Benign prostatic hyperplasia, unspecified whether lower urinary tract symptoms present  -     tamsulosin (FLOMAX) 0.4 MG capsule; Take 1 capsule by mouth daily for 5 doses  -     SD DISCHARGE MEDS RECONCILED W/ CURRENT OUTPATIENT MED ALBA  -     Alphonse Verma MD (GIANFRANCO)      Differential diagnosis includes acute hemorrhagic cystitis. With the positive urine culture as well as leukocytes in the urine I favor this diagnosis. He did not have any flank pain or abdominal pain. It was noted in the ER report that he had some dysuria but the patient adamantly denies that he had any discomfort whatsoever. He did pass a blood clot. Urinalysis here today is completely normal.    Additional Plan:  1. Advised patient to proceed to the emergency department should the hematuria returned. He may need a repeat CT scan.       25 minutes spent with the pt.  >50% spent reviewing test results and

## 2018-05-04 ENCOUNTER — OFFICE VISIT (OUTPATIENT)
Dept: INTERNAL MEDICINE CLINIC | Age: 73
End: 2018-05-04

## 2018-05-04 VITALS
RESPIRATION RATE: 16 BRPM | SYSTOLIC BLOOD PRESSURE: 130 MMHG | HEART RATE: 92 BPM | BODY MASS INDEX: 35.92 KG/M2 | WEIGHT: 237 LBS | DIASTOLIC BLOOD PRESSURE: 80 MMHG | HEIGHT: 68 IN

## 2018-05-04 DIAGNOSIS — G47.33 OSA (OBSTRUCTIVE SLEEP APNEA): ICD-10-CM

## 2018-05-04 DIAGNOSIS — I10 BENIGN ESSENTIAL HTN: ICD-10-CM

## 2018-05-04 DIAGNOSIS — E11.9 TYPE 2 DIABETES MELLITUS WITHOUT COMPLICATION, WITHOUT LONG-TERM CURRENT USE OF INSULIN (HCC): Primary | ICD-10-CM

## 2018-05-04 DIAGNOSIS — K21.9 GASTROESOPHAGEAL REFLUX DISEASE WITHOUT ESOPHAGITIS: ICD-10-CM

## 2018-05-04 DIAGNOSIS — E78.5 DYSLIPIDEMIA: ICD-10-CM

## 2018-05-04 PROBLEM — E78.00 HIGH CHOLESTEROL: Status: RESOLVED | Noted: 2017-09-05 | Resolved: 2018-05-04

## 2018-05-04 LAB
A/G RATIO: 1.7 (ref 1.1–2.2)
ALBUMIN SERPL-MCNC: 4.5 G/DL (ref 3.4–5)
ALP BLD-CCNC: 96 U/L (ref 40–129)
ALT SERPL-CCNC: 27 U/L (ref 10–40)
ANION GAP SERPL CALCULATED.3IONS-SCNC: 17 MMOL/L (ref 3–16)
AST SERPL-CCNC: 22 U/L (ref 15–37)
BILIRUB SERPL-MCNC: 0.7 MG/DL (ref 0–1)
BUN BLDV-MCNC: 17 MG/DL (ref 7–20)
CALCIUM SERPL-MCNC: 9.6 MG/DL (ref 8.3–10.6)
CHLORIDE BLD-SCNC: 108 MMOL/L (ref 99–110)
CHOLESTEROL, TOTAL: 134 MG/DL (ref 0–199)
CO2: 22 MMOL/L (ref 21–32)
CREAT SERPL-MCNC: 0.9 MG/DL (ref 0.8–1.3)
CREATININE URINE: 140.7 MG/DL (ref 39–259)
GFR AFRICAN AMERICAN: >60
GFR NON-AFRICAN AMERICAN: >60
GLOBULIN: 2.7 G/DL
GLUCOSE BLD-MCNC: 96 MG/DL (ref 70–99)
HDLC SERPL-MCNC: 50 MG/DL (ref 40–60)
LDL CHOLESTEROL CALCULATED: 58 MG/DL
MAGNESIUM: 2.2 MG/DL (ref 1.8–2.4)
MICROALBUMIN UR-MCNC: <1.2 MG/DL
MICROALBUMIN/CREAT UR-RTO: NORMAL MG/G (ref 0–30)
POTASSIUM SERPL-SCNC: 4.2 MMOL/L (ref 3.5–5.1)
SODIUM BLD-SCNC: 147 MMOL/L (ref 136–145)
TOTAL PROTEIN: 7.2 G/DL (ref 6.4–8.2)
TRIGL SERPL-MCNC: 132 MG/DL (ref 0–150)
VLDLC SERPL CALC-MCNC: 26 MG/DL

## 2018-05-04 PROCEDURE — G8417 CALC BMI ABV UP PARAM F/U: HCPCS | Performed by: INTERNAL MEDICINE

## 2018-05-04 PROCEDURE — 1123F ACP DISCUSS/DSCN MKR DOCD: CPT | Performed by: INTERNAL MEDICINE

## 2018-05-04 PROCEDURE — 2022F DILAT RTA XM EVC RTNOPTHY: CPT | Performed by: INTERNAL MEDICINE

## 2018-05-04 PROCEDURE — 4040F PNEUMOC VAC/ADMIN/RCVD: CPT | Performed by: INTERNAL MEDICINE

## 2018-05-04 PROCEDURE — 99214 OFFICE O/P EST MOD 30 MIN: CPT | Performed by: INTERNAL MEDICINE

## 2018-05-04 PROCEDURE — G8427 DOCREV CUR MEDS BY ELIG CLIN: HCPCS | Performed by: INTERNAL MEDICINE

## 2018-05-04 PROCEDURE — 3017F COLORECTAL CA SCREEN DOC REV: CPT | Performed by: INTERNAL MEDICINE

## 2018-05-04 PROCEDURE — 1036F TOBACCO NON-USER: CPT | Performed by: INTERNAL MEDICINE

## 2018-05-04 PROCEDURE — 3046F HEMOGLOBIN A1C LEVEL >9.0%: CPT | Performed by: INTERNAL MEDICINE

## 2018-05-04 ASSESSMENT — PATIENT HEALTH QUESTIONNAIRE - PHQ9
2. FEELING DOWN, DEPRESSED OR HOPELESS: 0
SUM OF ALL RESPONSES TO PHQ QUESTIONS 1-9: 0
1. LITTLE INTEREST OR PLEASURE IN DOING THINGS: 0
SUM OF ALL RESPONSES TO PHQ9 QUESTIONS 1 & 2: 0

## 2018-05-05 LAB
ESTIMATED AVERAGE GLUCOSE: 128.4 MG/DL
HBA1C MFR BLD: 6.1 %
TSH SERPL DL<=0.05 MIU/L-ACNC: 0.94 UIU/ML (ref 0.27–4.2)

## 2018-05-08 ENCOUNTER — TELEPHONE (OUTPATIENT)
Dept: INTERNAL MEDICINE CLINIC | Age: 73
End: 2018-05-08

## 2018-05-09 NOTE — TELEPHONE ENCOUNTER
Patient returning call regarding lab results. Per Dr. Berna Dahl note, I informed patient of the following;    Notes recorded by Richad Najjar, DO on 5/6/2018 at 9:35 PM EDT  Notify pt lab tests look better.  Sugar average is better. Stefano Sabina up the good work, but need to do more with Lifestyle modification including low calorie diet focusing on Low fat/low cholesterol and low carbohydrate intake, along with  increasing cardiovascular (aerobic) exercise. Patient has No additional questions. I have mailed him and additional script.

## 2018-06-28 ENCOUNTER — TELEPHONE (OUTPATIENT)
Dept: INTERNAL MEDICINE CLINIC | Age: 73
End: 2018-06-28

## 2018-08-17 ENCOUNTER — TELEPHONE (OUTPATIENT)
Dept: INTERNAL MEDICINE CLINIC | Age: 73
End: 2018-08-17

## 2018-08-17 RX ORDER — OMEPRAZOLE 40 MG/1
40 CAPSULE, DELAYED RELEASE ORAL DAILY
Qty: 90 CAPSULE | Refills: 3 | Status: SHIPPED | OUTPATIENT
Start: 2018-08-17 | End: 2019-08-28 | Stop reason: SDUPTHER

## 2018-08-17 NOTE — TELEPHONE ENCOUNTER
Patient requesting a script to go to [facility]. Patient also requesting a call from Geovanny Abad to discuss script. Patient can be reached at phone number provided.

## 2018-11-06 ENCOUNTER — OFFICE VISIT (OUTPATIENT)
Dept: INTERNAL MEDICINE CLINIC | Age: 73
End: 2018-11-06
Payer: MEDICARE

## 2018-11-06 VITALS
WEIGHT: 236 LBS | HEIGHT: 68 IN | DIASTOLIC BLOOD PRESSURE: 80 MMHG | BODY MASS INDEX: 35.77 KG/M2 | HEART RATE: 74 BPM | RESPIRATION RATE: 12 BRPM | SYSTOLIC BLOOD PRESSURE: 124 MMHG

## 2018-11-06 DIAGNOSIS — E78.5 DYSLIPIDEMIA: ICD-10-CM

## 2018-11-06 DIAGNOSIS — K21.9 GASTROESOPHAGEAL REFLUX DISEASE WITHOUT ESOPHAGITIS: ICD-10-CM

## 2018-11-06 DIAGNOSIS — I10 BENIGN ESSENTIAL HTN: ICD-10-CM

## 2018-11-06 DIAGNOSIS — E11.9 TYPE 2 DIABETES MELLITUS WITHOUT COMPLICATION, WITHOUT LONG-TERM CURRENT USE OF INSULIN (HCC): Primary | ICD-10-CM

## 2018-11-06 DIAGNOSIS — Z79.899 DRUG THERAPY: ICD-10-CM

## 2018-11-06 PROCEDURE — G8427 DOCREV CUR MEDS BY ELIG CLIN: HCPCS | Performed by: INTERNAL MEDICINE

## 2018-11-06 PROCEDURE — G8417 CALC BMI ABV UP PARAM F/U: HCPCS | Performed by: INTERNAL MEDICINE

## 2018-11-06 PROCEDURE — 4040F PNEUMOC VAC/ADMIN/RCVD: CPT | Performed by: INTERNAL MEDICINE

## 2018-11-06 PROCEDURE — 1101F PT FALLS ASSESS-DOCD LE1/YR: CPT | Performed by: INTERNAL MEDICINE

## 2018-11-06 PROCEDURE — 1036F TOBACCO NON-USER: CPT | Performed by: INTERNAL MEDICINE

## 2018-11-06 PROCEDURE — 99214 OFFICE O/P EST MOD 30 MIN: CPT | Performed by: INTERNAL MEDICINE

## 2018-11-06 PROCEDURE — G8482 FLU IMMUNIZE ORDER/ADMIN: HCPCS | Performed by: INTERNAL MEDICINE

## 2018-11-06 PROCEDURE — 3044F HG A1C LEVEL LT 7.0%: CPT | Performed by: INTERNAL MEDICINE

## 2018-11-06 PROCEDURE — 2022F DILAT RTA XM EVC RTNOPTHY: CPT | Performed by: INTERNAL MEDICINE

## 2018-11-06 PROCEDURE — 3017F COLORECTAL CA SCREEN DOC REV: CPT | Performed by: INTERNAL MEDICINE

## 2018-11-06 PROCEDURE — 1123F ACP DISCUSS/DSCN MKR DOCD: CPT | Performed by: INTERNAL MEDICINE

## 2018-11-06 RX ORDER — MOMETASONE FUROATE 50 UG/1
2 SPRAY, METERED NASAL DAILY
Qty: 3 INHALER | Refills: 4 | Status: SHIPPED | OUTPATIENT
Start: 2018-11-06 | End: 2019-07-25 | Stop reason: ALTCHOICE

## 2018-11-06 RX ORDER — SOLIFENACIN SUCCINATE 10 MG/1
10 TABLET, FILM COATED ORAL DAILY
COMMUNITY

## 2018-11-06 NOTE — PATIENT INSTRUCTIONS
Patient Education        Learning About Diabetes Food Guidelines  Your Care Instructions    Meal planning is important to manage diabetes. It helps keep your blood sugar at a target level (which you set with your doctor). You don't have to eat special foods. You can eat what your family eats, including sweets once in a while. But you do have to pay attention to how often you eat and how much you eat of certain foods. You may want to work with a dietitian or a certified diabetes educator (CDE) to help you plan meals and snacks. A dietitian or CDE can also help you lose weight if that is one of your goals. What should you know about eating carbs? Managing the amount of carbohydrate (carbs) you eat is an important part of healthy meals when you have diabetes. Carbohydrate is found in many foods. · Learn which foods have carbs. And learn the amounts of carbs in different foods. ¨ Bread, cereal, pasta, and rice have about 15 grams of carbs in a serving. A serving is 1 slice of bread (1 ounce), ½ cup of cooked cereal, or 1/3 cup of cooked pasta or rice. ¨ Fruits have 15 grams of carbs in a serving. A serving is 1 small fresh fruit, such as an apple or orange; ½ of a banana; ½ cup of cooked or canned fruit; ½ cup of fruit juice; 1 cup of melon or raspberries; or 2 tablespoons of dried fruit. ¨ Milk and no-sugar-added yogurt have 15 grams of carbs in a serving. A serving is 1 cup of milk or 2/3 cup of no-sugar-added yogurt. ¨ Starchy vegetables have 15 grams of carbs in a serving. A serving is ½ cup of mashed potatoes or sweet potato; 1 cup winter squash; ½ of a small baked potato; ½ cup of cooked beans; or ½ cup cooked corn or green peas. · Learn how much carbs to eat each day and at each meal. A dietitian or CDE can teach you how to keep track of the amount of carbs you eat. This is called carbohydrate counting. · If you are not sure how to count carbohydrate grams, use the Plate Method to plan meals.  It is a when cooking. · Don't skip meals. Your blood sugar may drop too low if you skip meals and take insulin or certain medicines for diabetes. · Check with your doctor before you drink alcohol. Alcohol can cause your blood sugar to drop too low. Alcohol can also cause a bad reaction if you take certain diabetes medicines. Follow-up care is a key part of your treatment and safety. Be sure to make and go to all appointments, and call your doctor if you are having problems. It's also a good idea to know your test results and keep a list of the medicines you take. Where can you learn more? Go to https://Oxford BioChronometricspepiceweb.Raffstar. org and sign in to your Axcient account. Enter R215 in the apta.me box to learn more about \"Learning About Diabetes Food Guidelines. \"     If you do not have an account, please click on the \"Sign Up Now\" link. Current as of: December 7, 2017  Content Version: 11.7  © 0262-9075 [employer]. Care instructions adapted under license by [facility] ([facility]). If you have questions about a medical condition or this instruction, always ask your healthcare professional. [address] any warranty or liability for your use of this information.

## 2018-11-06 NOTE — PROGRESS NOTES
some different strategies to help cut back on his calories and portions  - Comprehensive Metabolic Panel; Future  - Lipid Panel; Future    3. Dyslipidemia  Condition stability is uncertain. He should continue pravastatin as well as Zetia  Continue lifestyle approach  - Comprehensive Metabolic Panel; Future  - Lipid Panel; Future    4. Gastroesophageal reflux disease without esophagitis  Condition is well controlled on the Prilosec  H2 blockers of been ineffective  Check magnesium  - Magnesium; Future    5. Drug therapy    - Magnesium;  Future

## 2018-11-09 DIAGNOSIS — N28.9 RENAL INSUFFICIENCY: Primary | ICD-10-CM

## 2018-11-29 ENCOUNTER — TELEPHONE (OUTPATIENT)
Dept: INTERNAL MEDICINE CLINIC | Age: 73
End: 2018-11-29

## 2018-11-29 DIAGNOSIS — E78.5 DYSLIPIDEMIA: ICD-10-CM

## 2018-11-29 DIAGNOSIS — I10 BENIGN ESSENTIAL HTN: ICD-10-CM

## 2018-11-29 RX ORDER — AMLODIPINE BESYLATE 5 MG/1
5 TABLET ORAL DAILY
Qty: 90 TABLET | Refills: 3 | Status: SHIPPED | OUTPATIENT
Start: 2018-11-29 | End: 2019-12-09 | Stop reason: SDUPTHER

## 2018-11-29 RX ORDER — LOSARTAN POTASSIUM 100 MG/1
100 TABLET ORAL DAILY
Qty: 90 TABLET | Refills: 3 | Status: SHIPPED | OUTPATIENT
Start: 2018-11-29 | End: 2019-11-27 | Stop reason: SDUPTHER

## 2018-11-29 RX ORDER — EZETIMIBE 10 MG/1
10 TABLET ORAL DAILY
Qty: 90 TABLET | Refills: 3 | Status: SHIPPED | OUTPATIENT
Start: 2018-11-29 | End: 2019-12-09 | Stop reason: SDUPTHER

## 2018-11-29 RX ORDER — PRAVASTATIN SODIUM 80 MG/1
80 TABLET ORAL EVERY EVENING
Qty: 90 TABLET | Refills: 3 | Status: SHIPPED | OUTPATIENT
Start: 2018-11-29 | End: 2019-12-09 | Stop reason: SDUPTHER

## 2018-12-13 ENCOUNTER — APPOINTMENT (OUTPATIENT)
Dept: CT IMAGING | Age: 73
DRG: 661 | End: 2018-12-13
Payer: MEDICARE

## 2018-12-13 ENCOUNTER — APPOINTMENT (OUTPATIENT)
Dept: GENERAL RADIOLOGY | Age: 73
DRG: 661 | End: 2018-12-13
Payer: MEDICARE

## 2018-12-13 ENCOUNTER — HOSPITAL ENCOUNTER (INPATIENT)
Age: 73
LOS: 1 days | Discharge: HOME | DRG: 661 | End: 2018-12-14
Attending: EMERGENCY MEDICINE | Admitting: INTERNAL MEDICINE
Payer: MEDICARE

## 2018-12-13 ENCOUNTER — ANESTHESIA EVENT (OUTPATIENT)
Dept: OPERATING ROOM | Age: 73
DRG: 661 | End: 2018-12-13
Payer: MEDICARE

## 2018-12-13 ENCOUNTER — ANESTHESIA (OUTPATIENT)
Dept: OPERATING ROOM | Age: 73
DRG: 661 | End: 2018-12-13
Payer: MEDICARE

## 2018-12-13 VITALS
OXYGEN SATURATION: 97 % | SYSTOLIC BLOOD PRESSURE: 119 MMHG | RESPIRATION RATE: 18 BRPM | DIASTOLIC BLOOD PRESSURE: 85 MMHG

## 2018-12-13 DIAGNOSIS — R10.9 ACUTE ABDOMINAL PAIN: ICD-10-CM

## 2018-12-13 DIAGNOSIS — N20.1 URETERIC STONE: Primary | ICD-10-CM

## 2018-12-13 PROBLEM — N13.2 URETERAL STONE WITH HYDRONEPHROSIS: Status: ACTIVE | Noted: 2018-12-13

## 2018-12-13 PROBLEM — N13.30 HYDRONEPHROSIS: Status: ACTIVE | Noted: 2018-12-13

## 2018-12-13 LAB
A/G RATIO: 1.4 (ref 1.1–2.2)
ALBUMIN SERPL-MCNC: 3.8 G/DL (ref 3.4–5)
ALP BLD-CCNC: 90 U/L (ref 40–129)
ALT SERPL-CCNC: 22 U/L (ref 10–40)
ANION GAP SERPL CALCULATED.3IONS-SCNC: 11 MMOL/L (ref 3–16)
AST SERPL-CCNC: 19 U/L (ref 15–37)
BASOPHILS ABSOLUTE: 0.1 K/UL (ref 0–0.2)
BASOPHILS RELATIVE PERCENT: 1.2 %
BILIRUB SERPL-MCNC: 0.5 MG/DL (ref 0–1)
BILIRUBIN URINE: NEGATIVE
BLOOD, URINE: ABNORMAL
BUN BLDV-MCNC: 13 MG/DL (ref 7–20)
CALCIUM SERPL-MCNC: 8.9 MG/DL (ref 8.3–10.6)
CHLORIDE BLD-SCNC: 105 MMOL/L (ref 99–110)
CLARITY: CLEAR
CO2: 23 MMOL/L (ref 21–32)
COLOR: YELLOW
CREAT SERPL-MCNC: 1.3 MG/DL (ref 0.8–1.3)
EOSINOPHILS ABSOLUTE: 0.5 K/UL (ref 0–0.6)
EOSINOPHILS RELATIVE PERCENT: 6.1 %
EPITHELIAL CELLS, UA: 1 /HPF (ref 0–5)
GFR AFRICAN AMERICAN: >60
GFR NON-AFRICAN AMERICAN: 54
GLOBULIN: 2.8 G/DL
GLUCOSE BLD-MCNC: 112 MG/DL (ref 70–99)
GLUCOSE BLD-MCNC: 125 MG/DL (ref 70–99)
GLUCOSE BLD-MCNC: 171 MG/DL (ref 70–99)
GLUCOSE URINE: NEGATIVE MG/DL
HCT VFR BLD CALC: 42.9 % (ref 40.5–52.5)
HEMOGLOBIN: 14.4 G/DL (ref 13.5–17.5)
HYALINE CASTS: 1 /LPF (ref 0–8)
KETONES, URINE: NEGATIVE MG/DL
LEUKOCYTE ESTERASE, URINE: NEGATIVE
LIPASE: 24 U/L (ref 13–60)
LYMPHOCYTES ABSOLUTE: 2.8 K/UL (ref 1–5.1)
LYMPHOCYTES RELATIVE PERCENT: 33 %
MCH RBC QN AUTO: 31.7 PG (ref 26–34)
MCHC RBC AUTO-ENTMCNC: 33.6 G/DL (ref 31–36)
MCV RBC AUTO: 94.4 FL (ref 80–100)
MICROSCOPIC EXAMINATION: YES
MONOCYTES ABSOLUTE: 0.7 K/UL (ref 0–1.3)
MONOCYTES RELATIVE PERCENT: 7.9 %
NEUTROPHILS ABSOLUTE: 4.5 K/UL (ref 1.7–7.7)
NEUTROPHILS RELATIVE PERCENT: 51.8 %
NITRITE, URINE: NEGATIVE
PDW BLD-RTO: 13.9 % (ref 12.4–15.4)
PERFORMED ON: ABNORMAL
PERFORMED ON: ABNORMAL
PH UA: 5.5
PLATELET # BLD: 189 K/UL (ref 135–450)
PMV BLD AUTO: 10.2 FL (ref 5–10.5)
POTASSIUM REFLEX MAGNESIUM: 4.1 MMOL/L (ref 3.5–5.1)
PROTEIN UA: NEGATIVE MG/DL
RBC # BLD: 4.54 M/UL (ref 4.2–5.9)
RBC UA: 12 /HPF (ref 0–4)
SODIUM BLD-SCNC: 139 MMOL/L (ref 136–145)
SPECIFIC GRAVITY UA: 1.02
TOTAL PROTEIN: 6.6 G/DL (ref 6.4–8.2)
URINE TYPE: ABNORMAL
UROBILINOGEN, URINE: 0.2 E.U./DL
WBC # BLD: 8.6 K/UL (ref 4–11)
WBC UA: 1 /HPF (ref 0–5)

## 2018-12-13 PROCEDURE — 3700000000 HC ANESTHESIA ATTENDED CARE: Performed by: UROLOGY

## 2018-12-13 PROCEDURE — 7100000001 HC PACU RECOVERY - ADDTL 15 MIN: Performed by: UROLOGY

## 2018-12-13 PROCEDURE — 36415 COLL VENOUS BLD VENIPUNCTURE: CPT

## 2018-12-13 PROCEDURE — 2500000003 HC RX 250 WO HCPCS: Performed by: EMERGENCY MEDICINE

## 2018-12-13 PROCEDURE — 6360000002 HC RX W HCPCS: Performed by: INTERNAL MEDICINE

## 2018-12-13 PROCEDURE — 6360000002 HC RX W HCPCS: Performed by: EMERGENCY MEDICINE

## 2018-12-13 PROCEDURE — C1769 GUIDE WIRE: HCPCS | Performed by: UROLOGY

## 2018-12-13 PROCEDURE — C1894 INTRO/SHEATH, NON-LASER: HCPCS | Performed by: UROLOGY

## 2018-12-13 PROCEDURE — 96375 TX/PRO/DX INJ NEW DRUG ADDON: CPT

## 2018-12-13 PROCEDURE — 3700000001 HC ADD 15 MINUTES (ANESTHESIA): Performed by: UROLOGY

## 2018-12-13 PROCEDURE — 2580000003 HC RX 258: Performed by: UROLOGY

## 2018-12-13 PROCEDURE — 96374 THER/PROPH/DIAG INJ IV PUSH: CPT

## 2018-12-13 PROCEDURE — 80053 COMPREHEN METABOLIC PANEL: CPT

## 2018-12-13 PROCEDURE — 81001 URINALYSIS AUTO W/SCOPE: CPT

## 2018-12-13 PROCEDURE — 2500000003 HC RX 250 WO HCPCS: Performed by: NURSE ANESTHETIST, CERTIFIED REGISTERED

## 2018-12-13 PROCEDURE — 96361 HYDRATE IV INFUSION ADD-ON: CPT

## 2018-12-13 PROCEDURE — 6360000004 HC RX CONTRAST MEDICATION: Performed by: UROLOGY

## 2018-12-13 PROCEDURE — 2709999900 HC NON-CHARGEABLE SUPPLY: Performed by: UROLOGY

## 2018-12-13 PROCEDURE — 2580000003 HC RX 258: Performed by: EMERGENCY MEDICINE

## 2018-12-13 PROCEDURE — 2580000003 HC RX 258: Performed by: NURSE ANESTHETIST, CERTIFIED REGISTERED

## 2018-12-13 PROCEDURE — 2580000003 HC RX 258: Performed by: INTERNAL MEDICINE

## 2018-12-13 PROCEDURE — 83690 ASSAY OF LIPASE: CPT

## 2018-12-13 PROCEDURE — 0T778DZ DILATION OF LEFT URETER WITH INTRALUMINAL DEVICE, VIA NATURAL OR ARTIFICIAL OPENING ENDOSCOPIC: ICD-10-PCS | Performed by: UROLOGY

## 2018-12-13 PROCEDURE — 3600000004 HC SURGERY LEVEL 4 BASE: Performed by: UROLOGY

## 2018-12-13 PROCEDURE — 6360000002 HC RX W HCPCS: Performed by: NURSE ANESTHETIST, CERTIFIED REGISTERED

## 2018-12-13 PROCEDURE — 2500000003 HC RX 250 WO HCPCS: Performed by: INTERNAL MEDICINE

## 2018-12-13 PROCEDURE — 6370000000 HC RX 637 (ALT 250 FOR IP): Performed by: INTERNAL MEDICINE

## 2018-12-13 PROCEDURE — 74176 CT ABD & PELVIS W/O CONTRAST: CPT

## 2018-12-13 PROCEDURE — 99285 EMERGENCY DEPT VISIT HI MDM: CPT

## 2018-12-13 PROCEDURE — 85025 COMPLETE CBC W/AUTO DIFF WBC: CPT

## 2018-12-13 PROCEDURE — 7100000000 HC PACU RECOVERY - FIRST 15 MIN: Performed by: UROLOGY

## 2018-12-13 PROCEDURE — 1200000000 HC SEMI PRIVATE

## 2018-12-13 PROCEDURE — 3600000014 HC SURGERY LEVEL 4 ADDTL 15MIN: Performed by: UROLOGY

## 2018-12-13 PROCEDURE — 87040 BLOOD CULTURE FOR BACTERIA: CPT

## 2018-12-13 PROCEDURE — C2617 STENT, NON-COR, TEM W/O DEL: HCPCS | Performed by: UROLOGY

## 2018-12-13 PROCEDURE — 74420 UROGRAPHY RTRGR +-KUB: CPT

## 2018-12-13 PROCEDURE — BT1F0ZZ FLUOROSCOPY OF LEFT KIDNEY, URETER AND BLADDER USING HIGH OSMOLAR CONTRAST: ICD-10-PCS | Performed by: UROLOGY

## 2018-12-13 DEVICE — STENT URET 6FR L26CM PERCFLX HYDR+ TAPR TIP GRAD
Type: IMPLANTABLE DEVICE | Status: NON-FUNCTIONAL
Removed: 2019-01-02

## 2018-12-13 RX ORDER — HYDRALAZINE HYDROCHLORIDE 20 MG/ML
5 INJECTION INTRAMUSCULAR; INTRAVENOUS EVERY 10 MIN PRN
Status: DISCONTINUED | OUTPATIENT
Start: 2018-12-13 | End: 2018-12-13 | Stop reason: HOSPADM

## 2018-12-13 RX ORDER — LIDOCAINE HYDROCHLORIDE 20 MG/ML
INJECTION, SOLUTION INFILTRATION; PERINEURAL PRN
Status: DISCONTINUED | OUTPATIENT
Start: 2018-12-13 | End: 2018-12-13 | Stop reason: SDUPTHER

## 2018-12-13 RX ORDER — SODIUM CHLORIDE 0.9 % (FLUSH) 0.9 %
10 SYRINGE (ML) INJECTION PRN
Status: CANCELLED | OUTPATIENT
Start: 2018-12-13

## 2018-12-13 RX ORDER — TAMSULOSIN HYDROCHLORIDE 0.4 MG/1
0.4 CAPSULE ORAL DAILY
Status: DISCONTINUED | OUTPATIENT
Start: 2018-12-13 | End: 2018-12-14 | Stop reason: HOSPADM

## 2018-12-13 RX ORDER — HYDROMORPHONE HCL 110MG/55ML
0.5 PATIENT CONTROLLED ANALGESIA SYRINGE INTRAVENOUS EVERY 5 MIN PRN
Status: DISCONTINUED | OUTPATIENT
Start: 2018-12-13 | End: 2018-12-13 | Stop reason: HOSPADM

## 2018-12-13 RX ORDER — OXYCODONE HYDROCHLORIDE AND ACETAMINOPHEN 5; 325 MG/1; MG/1
1 TABLET ORAL
Status: DISCONTINUED | OUTPATIENT
Start: 2018-12-13 | End: 2018-12-13 | Stop reason: HOSPADM

## 2018-12-13 RX ORDER — SODIUM CHLORIDE 0.9 % (FLUSH) 0.9 %
10 SYRINGE (ML) INJECTION PRN
Status: DISCONTINUED | OUTPATIENT
Start: 2018-12-13 | End: 2018-12-14 | Stop reason: HOSPADM

## 2018-12-13 RX ORDER — DEXAMETHASONE SODIUM PHOSPHATE 10 MG/ML
INJECTION, SOLUTION INTRAMUSCULAR; INTRAVENOUS PRN
Status: DISCONTINUED | OUTPATIENT
Start: 2018-12-13 | End: 2018-12-13 | Stop reason: SDUPTHER

## 2018-12-13 RX ORDER — LIDOCAINE HYDROCHLORIDE 10 MG/ML
1 INJECTION, SOLUTION EPIDURAL; INFILTRATION; INTRACAUDAL; PERINEURAL
Status: CANCELLED | OUTPATIENT
Start: 2018-12-13 | End: 2018-12-13

## 2018-12-13 RX ORDER — FLUTICASONE PROPIONATE 50 MCG
2 SPRAY, SUSPENSION (ML) NASAL DAILY
Status: DISCONTINUED | OUTPATIENT
Start: 2018-12-13 | End: 2018-12-14 | Stop reason: HOSPADM

## 2018-12-13 RX ORDER — KETOROLAC TROMETHAMINE 30 MG/ML
15 INJECTION, SOLUTION INTRAMUSCULAR; INTRAVENOUS ONCE
Status: COMPLETED | OUTPATIENT
Start: 2018-12-13 | End: 2018-12-13

## 2018-12-13 RX ORDER — ONDANSETRON 2 MG/ML
4 INJECTION INTRAMUSCULAR; INTRAVENOUS EVERY 6 HOURS PRN
Status: DISCONTINUED | OUTPATIENT
Start: 2018-12-13 | End: 2018-12-14 | Stop reason: HOSPADM

## 2018-12-13 RX ORDER — OMEPRAZOLE 20 MG/1
40 CAPSULE, DELAYED RELEASE ORAL DAILY
Status: DISCONTINUED | OUTPATIENT
Start: 2018-12-13 | End: 2018-12-13 | Stop reason: CLARIF

## 2018-12-13 RX ORDER — FENTANYL CITRATE 50 UG/ML
INJECTION, SOLUTION INTRAMUSCULAR; INTRAVENOUS PRN
Status: DISCONTINUED | OUTPATIENT
Start: 2018-12-13 | End: 2018-12-13 | Stop reason: SDUPTHER

## 2018-12-13 RX ORDER — SODIUM CHLORIDE 9 MG/ML
INJECTION, SOLUTION INTRAVENOUS CONTINUOUS
Status: DISCONTINUED | OUTPATIENT
Start: 2018-12-13 | End: 2018-12-14 | Stop reason: HOSPADM

## 2018-12-13 RX ORDER — LABETALOL HYDROCHLORIDE 5 MG/ML
5 INJECTION, SOLUTION INTRAVENOUS EVERY 10 MIN PRN
Status: DISCONTINUED | OUTPATIENT
Start: 2018-12-13 | End: 2018-12-13 | Stop reason: HOSPADM

## 2018-12-13 RX ORDER — ASPIRIN 81 MG/1
81 TABLET, CHEWABLE ORAL DAILY
Status: DISCONTINUED | OUTPATIENT
Start: 2018-12-13 | End: 2018-12-14 | Stop reason: HOSPADM

## 2018-12-13 RX ORDER — POTASSIUM CHLORIDE 20 MEQ/1
40 TABLET, EXTENDED RELEASE ORAL PRN
Status: DISCONTINUED | OUTPATIENT
Start: 2018-12-13 | End: 2018-12-14 | Stop reason: HOSPADM

## 2018-12-13 RX ORDER — 0.9 % SODIUM CHLORIDE 0.9 %
1000 INTRAVENOUS SOLUTION INTRAVENOUS ONCE
Status: COMPLETED | OUTPATIENT
Start: 2018-12-13 | End: 2018-12-13

## 2018-12-13 RX ORDER — ONDANSETRON 2 MG/ML
4 INJECTION INTRAMUSCULAR; INTRAVENOUS EVERY 30 MIN PRN
Status: COMPLETED | OUTPATIENT
Start: 2018-12-13 | End: 2018-12-13

## 2018-12-13 RX ORDER — MORPHINE SULFATE 4 MG/ML
4 INJECTION, SOLUTION INTRAMUSCULAR; INTRAVENOUS ONCE
Status: COMPLETED | OUTPATIENT
Start: 2018-12-13 | End: 2018-12-13

## 2018-12-13 RX ORDER — FENTANYL CITRATE 50 UG/ML
25 INJECTION, SOLUTION INTRAMUSCULAR; INTRAVENOUS EVERY 5 MIN PRN
Status: DISCONTINUED | OUTPATIENT
Start: 2018-12-13 | End: 2018-12-13 | Stop reason: HOSPADM

## 2018-12-13 RX ORDER — BISACODYL 10 MG
10 SUPPOSITORY, RECTAL RECTAL DAILY PRN
Status: DISCONTINUED | OUTPATIENT
Start: 2018-12-13 | End: 2018-12-14 | Stop reason: HOSPADM

## 2018-12-13 RX ORDER — TROSPIUM CHLORIDE 20 MG/1
20 TABLET, FILM COATED ORAL
Status: DISCONTINUED | OUTPATIENT
Start: 2018-12-13 | End: 2018-12-14 | Stop reason: HOSPADM

## 2018-12-13 RX ORDER — AMLODIPINE BESYLATE 5 MG/1
5 TABLET ORAL DAILY
Status: DISCONTINUED | OUTPATIENT
Start: 2018-12-13 | End: 2018-12-14 | Stop reason: HOSPADM

## 2018-12-13 RX ORDER — PANTOPRAZOLE SODIUM 40 MG/1
40 TABLET, DELAYED RELEASE ORAL
Status: DISCONTINUED | OUTPATIENT
Start: 2018-12-13 | End: 2018-12-14 | Stop reason: HOSPADM

## 2018-12-13 RX ORDER — ONDANSETRON 2 MG/ML
INJECTION INTRAMUSCULAR; INTRAVENOUS PRN
Status: DISCONTINUED | OUTPATIENT
Start: 2018-12-13 | End: 2018-12-13 | Stop reason: SDUPTHER

## 2018-12-13 RX ORDER — SODIUM CHLORIDE 9 MG/ML
INJECTION, SOLUTION INTRAVENOUS CONTINUOUS PRN
Status: DISCONTINUED | OUTPATIENT
Start: 2018-12-13 | End: 2018-12-13 | Stop reason: SDUPTHER

## 2018-12-13 RX ORDER — MAGNESIUM HYDROXIDE 1200 MG/15ML
LIQUID ORAL
Status: COMPLETED | OUTPATIENT
Start: 2018-12-13 | End: 2018-12-13

## 2018-12-13 RX ORDER — SODIUM CHLORIDE 0.9 % (FLUSH) 0.9 %
10 SYRINGE (ML) INJECTION EVERY 12 HOURS SCHEDULED
Status: CANCELLED | OUTPATIENT
Start: 2018-12-13

## 2018-12-13 RX ORDER — POTASSIUM CHLORIDE 7.45 MG/ML
10 INJECTION INTRAVENOUS PRN
Status: DISCONTINUED | OUTPATIENT
Start: 2018-12-13 | End: 2018-12-14 | Stop reason: HOSPADM

## 2018-12-13 RX ORDER — SODIUM CHLORIDE, SODIUM LACTATE, POTASSIUM CHLORIDE, CALCIUM CHLORIDE 600; 310; 30; 20 MG/100ML; MG/100ML; MG/100ML; MG/100ML
INJECTION, SOLUTION INTRAVENOUS CONTINUOUS
Status: CANCELLED | OUTPATIENT
Start: 2018-12-13

## 2018-12-13 RX ORDER — LOSARTAN POTASSIUM 100 MG/1
100 TABLET ORAL DAILY
Status: DISCONTINUED | OUTPATIENT
Start: 2018-12-13 | End: 2018-12-14 | Stop reason: HOSPADM

## 2018-12-13 RX ORDER — PROPOFOL 10 MG/ML
INJECTION, EMULSION INTRAVENOUS PRN
Status: DISCONTINUED | OUTPATIENT
Start: 2018-12-13 | End: 2018-12-13 | Stop reason: SDUPTHER

## 2018-12-13 RX ORDER — ONDANSETRON 2 MG/ML
4 INJECTION INTRAMUSCULAR; INTRAVENOUS
Status: DISCONTINUED | OUTPATIENT
Start: 2018-12-13 | End: 2018-12-13 | Stop reason: HOSPADM

## 2018-12-13 RX ORDER — MOMETASONE FUROATE 50 UG/1
2 SPRAY, METERED NASAL DAILY
Status: DISCONTINUED | OUTPATIENT
Start: 2018-12-13 | End: 2018-12-13 | Stop reason: CLARIF

## 2018-12-13 RX ORDER — SODIUM CHLORIDE 0.9 % (FLUSH) 0.9 %
10 SYRINGE (ML) INJECTION EVERY 12 HOURS SCHEDULED
Status: DISCONTINUED | OUTPATIENT
Start: 2018-12-13 | End: 2018-12-14 | Stop reason: HOSPADM

## 2018-12-13 RX ORDER — PRAVASTATIN SODIUM 40 MG
80 TABLET ORAL EVERY EVENING
Status: DISCONTINUED | OUTPATIENT
Start: 2018-12-13 | End: 2018-12-14 | Stop reason: HOSPADM

## 2018-12-13 RX ORDER — SOLIFENACIN SUCCINATE 10 MG/1
5 TABLET, FILM COATED ORAL DAILY
Status: DISCONTINUED | OUTPATIENT
Start: 2018-12-13 | End: 2018-12-13 | Stop reason: CLARIF

## 2018-12-13 RX ORDER — SOLIFENACIN SUCCINATE 10 MG/1
5 TABLET, FILM COATED ORAL DAILY
Status: CANCELLED | OUTPATIENT
Start: 2018-12-13

## 2018-12-13 RX ORDER — FOLIC ACID 1 MG/1
500 TABLET ORAL DAILY
Status: DISCONTINUED | OUTPATIENT
Start: 2018-12-13 | End: 2018-12-14 | Stop reason: HOSPADM

## 2018-12-13 RX ADMIN — ONDANSETRON 4 MG: 2 INJECTION INTRAMUSCULAR; INTRAVENOUS at 09:22

## 2018-12-13 RX ADMIN — ONDANSETRON HYDROCHLORIDE 4 MG: 2 INJECTION, SOLUTION INTRAMUSCULAR; INTRAVENOUS at 05:22

## 2018-12-13 RX ADMIN — MAGNESIUM HYDROXIDE 30 ML: 400 SUSPENSION ORAL at 17:23

## 2018-12-13 RX ADMIN — HYDROMORPHONE HYDROCHLORIDE 1 MG: 1 INJECTION, SOLUTION INTRAMUSCULAR; INTRAVENOUS; SUBCUTANEOUS at 05:58

## 2018-12-13 RX ADMIN — SODIUM CHLORIDE: 9 INJECTION, SOLUTION INTRAVENOUS at 17:22

## 2018-12-13 RX ADMIN — PROPOFOL 110 MG: 10 INJECTION, EMULSION INTRAVENOUS at 09:15

## 2018-12-13 RX ADMIN — LIDOCAINE HYDROCHLORIDE 60 MG: 20 INJECTION, SOLUTION INFILTRATION; PERINEURAL at 09:15

## 2018-12-13 RX ADMIN — LOSARTAN POTASSIUM 100 MG: 100 TABLET, FILM COATED ORAL at 17:15

## 2018-12-13 RX ADMIN — CEFTRIAXONE SODIUM 1 G: 10 INJECTION, POWDER, FOR SOLUTION INTRAVENOUS at 23:15

## 2018-12-13 RX ADMIN — FENTANYL CITRATE 50 MCG: 50 INJECTION, SOLUTION INTRAMUSCULAR; INTRAVENOUS at 09:15

## 2018-12-13 RX ADMIN — ENOXAPARIN SODIUM 40 MG: 40 INJECTION SUBCUTANEOUS at 20:33

## 2018-12-13 RX ADMIN — MORPHINE SULFATE 4 MG: 4 INJECTION INTRAVENOUS at 05:22

## 2018-12-13 RX ADMIN — FLUTICASONE PROPIONATE 2 SPRAY: 50 SPRAY, METERED NASAL at 17:23

## 2018-12-13 RX ADMIN — ASPIRIN 81 MG 81 MG: 81 TABLET ORAL at 17:15

## 2018-12-13 RX ADMIN — AMLODIPINE BESYLATE 5 MG: 5 TABLET ORAL at 17:15

## 2018-12-13 RX ADMIN — CEFTRIAXONE SODIUM 1 G: 10 INJECTION, POWDER, FOR SOLUTION INTRAVENOUS at 06:54

## 2018-12-13 RX ADMIN — PRAVASTATIN SODIUM 80 MG: 40 TABLET ORAL at 17:32

## 2018-12-13 RX ADMIN — PANTOPRAZOLE SODIUM 40 MG: 40 TABLET, DELAYED RELEASE ORAL at 17:14

## 2018-12-13 RX ADMIN — SODIUM CHLORIDE: 9 INJECTION, SOLUTION INTRAVENOUS at 09:10

## 2018-12-13 RX ADMIN — TROSPIUM CHLORIDE 20 MG: 20 TABLET ORAL at 17:15

## 2018-12-13 RX ADMIN — ONDANSETRON HYDROCHLORIDE 4 MG: 2 INJECTION, SOLUTION INTRAMUSCULAR; INTRAVENOUS at 05:57

## 2018-12-13 RX ADMIN — FOLIC ACID 500 MCG: 1 TABLET ORAL at 17:14

## 2018-12-13 RX ADMIN — TAMSULOSIN HYDROCHLORIDE 0.4 MG: 0.4 CAPSULE ORAL at 17:22

## 2018-12-13 RX ADMIN — SODIUM CHLORIDE: 9 INJECTION, SOLUTION INTRAVENOUS at 11:59

## 2018-12-13 RX ADMIN — KETOROLAC TROMETHAMINE 15 MG: 30 INJECTION, SOLUTION INTRAMUSCULAR at 05:57

## 2018-12-13 RX ADMIN — DEXAMETHASONE SODIUM PHOSPHATE 4 MG: 10 INJECTION, SOLUTION INTRAMUSCULAR; INTRAVENOUS at 09:22

## 2018-12-13 RX ADMIN — SODIUM CHLORIDE 1000 ML: 9 INJECTION, SOLUTION INTRAVENOUS at 05:22

## 2018-12-13 ASSESSMENT — PULMONARY FUNCTION TESTS
PIF_VALUE: 2
PIF_VALUE: 5
PIF_VALUE: 1
PIF_VALUE: 1
PIF_VALUE: 2
PIF_VALUE: 0
PIF_VALUE: 1
PIF_VALUE: 4
PIF_VALUE: 4
PIF_VALUE: 5
PIF_VALUE: 1
PIF_VALUE: 4
PIF_VALUE: 5
PIF_VALUE: 4
PIF_VALUE: 16
PIF_VALUE: 4
PIF_VALUE: 5
PIF_VALUE: 16
PIF_VALUE: 1
PIF_VALUE: 3
PIF_VALUE: 4
PIF_VALUE: 5
PIF_VALUE: 5
PIF_VALUE: 14
PIF_VALUE: 4

## 2018-12-13 ASSESSMENT — PAIN SCALES - GENERAL
PAINLEVEL_OUTOF10: 8
PAINLEVEL_OUTOF10: 0
PAINLEVEL_OUTOF10: 8
PAINLEVEL_OUTOF10: 10
PAINLEVEL_OUTOF10: 0

## 2018-12-13 ASSESSMENT — ENCOUNTER SYMPTOMS
EYE PAIN: 0
NAUSEA: 0
SHORTNESS OF BREATH: 0
EYE ITCHING: 0
VOMITING: 0
SHORTNESS OF BREATH: 0
ABDOMINAL PAIN: 1
COUGH: 0

## 2018-12-13 NOTE — ED PROVIDER NOTES
ureteral stone     Type 2 diabetes mellitus without complication, without long-term current use of insulin (HCC)     Type 2 diabetes mellitus without complication, without long-term current use of insulin ([facility] [address]          SURGICALHISTORY       Past Surgical History:   Procedure Laterality Date    APPENDECTOMY      BONE RESECTION Left     bone spur removal left elbow    CARDIAC CATHETERIZATION  3/23/2012    CATARACT REMOVAL WITH IMPLANT Left 11/01/2017    CATARACT REMOVAL WITH IMPLANT Right 10/2017    CHOLECYSTECTOMY, LAPAROSCOPIC  09/07/2017    COLONOSCOPY  7/2008    multiple    CYSTOSCOPY  12/2/2014    Retrograde Pyelogram, stent, Dr. Reinier Chan      left hip replacement    RIGHT COLECTOMY  3/28/2012    laparoscopic    TONSILLECTOMY           CURRENT MEDICATIONS       Previous Medications    AMLODIPINE (NORVASC) 5 MG TABLET    Take 1 tablet by mouth daily    ASPIRIN 81 MG TABLET    Take 81 mg by mouth daily    CHOLECALCIFEROL (VITAMIN D) 2000 UNITS CAPS CAPSULE    Take  by mouth. EZETIMIBE (ZETIA) 10 MG TABLET    Take 1 tablet by mouth daily    FOLIC ACID (FOLVITE) 957 MCG TABLET    Take 400 mcg by mouth daily    GLUCOSAMINE HCL-MSM (GLUCOSAMINE-MSM PO)    Take 1 tablet by mouth 2 times daily     LOSARTAN (COZAAR) 100 MG TABLET    Take 1 tablet by mouth daily    MOMETASONE (NASONEX) 50 MCG/ACT NASAL SPRAY    2 sprays by Nasal route daily    OMEPRAZOLE (PRILOSEC) 40 MG DELAYED RELEASE CAPSULE    Take 1 capsule by mouth daily    PRAVASTATIN (PRAVACHOL) 80 MG TABLET    Take 1 tablet by mouth every evening    SOLIFENACIN (VESICARE) 10 MG TABLET    Take 5 mg by mouth daily    TAMSULOSIN (FLOMAX) 0.4 MG CAPSULE    Take 1 capsule by mouth daily       ALLERGIES     Patient has no known allergies.     FAMILY HISTORY       Family History   Problem Relation Age of Onset    Mental Illness Mother         Alzheimers    Cancer Father         Leukemia    Diabetes Brother    Rosi Miranda DIAGNOSIS/MDM:   Vitals:    Vitals:    12/13/18 0506 12/13/18 0509 12/13/18 0637 12/13/18 0645   BP: (!) 173/102 (!) 173/102 137/79 123/85   Pulse:  57     Resp:  16     Temp:  96.9 °F (36.1 °C)     SpO2: 96% 99%         MDM:   A CT shows a 6 mm, mildly obstructing ureteral stone with some Hydro present. Urine does not look infected. Fluids and antibiotics given    Case discussed with Dr. Zbigniew Moon of urology. He recommends the patient be n.p.o. and he may operate on the patient today. Case then discussed with Dr. Jelena Hoskins who will admit. Patient's pain was pretty impressive today. He did require quite a bit of IV medication for pain control. But eventually did become more comfortable. Provided IV fluids and other medications. CONSULTS:  None      PROCEDURES:  Unless otherwise noted below, none       Procedures    FINAL IMPRESSION      1. Ureteric stone    2. Acute abdominal pain          DISPOSITION/PLAN   DISPOSITION Decision To Admit 12/13/2018 06:57:11 AM      PATIENT REFERRED TO:  No follow-up provider specified.     DISCHARGE MEDICATIONS:  New Prescriptions    No medications on file          (Please note that portions of this note were completed with a voice recognition program.  Efforts weremade to edit the dictations but occasionally words are mis-transcribed.)    Olga Gold III, DO (electronically signed)  Attending Emergency Physician          Shubham Saavedra III, DO  12/13/18 0749

## 2018-12-13 NOTE — PROGRESS NOTES
Patient ambulated in hallway, tolerated well, back to room without incident, call light in reach, will continue to monitor.

## 2018-12-13 NOTE — H&P
worse  Associated Signs or Symptoms: no f/c. No n/v. Problem list of hospitalization thus far: Active Hospital Problems    Diagnosis    Hydronephrosis [N13.30]    Ureteral stone with hydronephrosis [N13.2]    Dyslipidemia [E78.5]    Benign essential HTN [I10]    GERD (gastroesophageal reflux disease) [K21.9]         Review of Systems: (1 system for EPF, 2-9 for detailed, 10+ for comprehensive)  Review of Systems   Constitutional: Negative for chills and fever. HENT: Negative for mouth sores and sneezing. Eyes: Negative for pain and itching. Respiratory: Negative for cough and shortness of breath. Cardiovascular: Negative for chest pain and leg swelling. Gastrointestinal: Positive for abdominal pain. Negative for nausea and vomiting. Endocrine: Negative for polydipsia and polyphagia. Genitourinary: Positive for dysuria and flank pain. Negative for hematuria. Musculoskeletal: Positive for arthralgias. Skin: Negative for rash. Allergic/Immunologic: Negative for environmental allergies. Neurological: Negative for light-headedness and headaches. Hematological: Negative for adenopathy. Psychiatric/Behavioral: Negative for dysphoric mood. The patient is not nervous/anxious.             Past Medical History:   Past Medical History:   Diagnosis Date    BPH     Colon polyps     DDD (degenerative disc disease), lumbar     Diverticulosis     Gallstone pancreatitis 09/05/2017    GERD (gastroesophageal reflux disease)     Hyperlipidemia     Hypertension     Impaired fasting blood sugar     Kidney stones 11/10    Lumbar spondylosis     OAB (overactive bladder)     Osteoarthritis     Osteoarthritis of right knee     Pneumonia     pneumonia    RBBB 12/19/2016    Renal cyst     Retinal vasculitis     Retinal vasculitis     Right ureteral stone     Type 2 diabetes mellitus without complication, without long-term current use of insulin (HCC)     Type 2 diabetes mellitus

## 2018-12-13 NOTE — ED NOTES
Patient resting comfortably with no signs of distress. Denies any needs at this time. Bed locked and in lowest position with both side rails raised. Call light within reach.         Eddie Morrison RN  12/13/18 0031

## 2018-12-13 NOTE — ANESTHESIA PRE PROCEDURE
Years: 16.00     Quit date: 5/16/1977    Smokeless tobacco: Never Used      Comment: 1974    Alcohol use 1.0 oz/week     2 Standard drinks or equivalent per week      Comment: ONCE A WEEK                                Counseling given: Not Answered      Vital Signs (Current):   Vitals:    12/13/18 0700 12/13/18 0715 12/13/18 0730 12/13/18 0745   BP: 128/80 129/76 115/79 115/77   Pulse:    62   Resp:    16   Temp:    97.5 °F (36.4 °C)   TempSrc:    Oral   SpO2:    100%                                              BP Readings from Last 3 Encounters:   12/13/18 115/77   11/06/18 124/80   05/04/18 130/80       NPO Status:                                                                                 BMI:   Wt Readings from Last 3 Encounters:   11/06/18 236 lb (107 kg)   05/04/18 237 lb (107.5 kg)   02/09/18 233 lb (105.7 kg)     There is no height or weight on file to calculate BMI.    CBC:   Lab Results   Component Value Date    WBC 8.6 12/13/2018    RBC 4.54 12/13/2018    HGB 14.4 12/13/2018    HCT 42.9 12/13/2018    MCV 94.4 12/13/2018    RDW 13.9 12/13/2018     12/13/2018       CMP:   Lab Results   Component Value Date     12/13/2018    K 4.1 12/13/2018     12/13/2018    CO2 23 12/13/2018    BUN 13 12/13/2018    CREATININE 1.3 12/13/2018    GFRAA >60 12/13/2018    GFRAA >60 06/04/2013    AGRATIO 1.4 12/13/2018    LABGLOM 54 12/13/2018    GLUCOSE 171 12/13/2018    PROT 6.6 12/13/2018    PROT 7.5 12/04/2012    CALCIUM 8.9 12/13/2018    BILITOT 0.5 12/13/2018    ALKPHOS 90 12/13/2018    AST 19 12/13/2018    ALT 22 12/13/2018       POC Tests: No results for input(s): POCGLU, POCNA, POCK, POCCL, POCBUN, POCHEMO, POCHCT in the last 72 hours.     Coags:   Lab Results   Component Value Date    PROTIME 10.8 01/21/2018    INR 0.96 01/21/2018    APTT 28.0 01/21/2018       HCG (If Applicable): No results found for: PREGTESTUR, PREGSERUM, HCG, HCGQUANT     ABGs: No results found for: PHART, PO2ART, [id number],

## 2018-12-14 VITALS
OXYGEN SATURATION: 96 % | HEIGHT: 68 IN | DIASTOLIC BLOOD PRESSURE: 93 MMHG | TEMPERATURE: 97.7 F | WEIGHT: 244.1 LBS | SYSTOLIC BLOOD PRESSURE: 131 MMHG | HEART RATE: 86 BPM | RESPIRATION RATE: 16 BRPM | BODY MASS INDEX: 36.99 KG/M2

## 2018-12-14 LAB
ANION GAP SERPL CALCULATED.3IONS-SCNC: 12 MMOL/L (ref 3–16)
BASOPHILS ABSOLUTE: 0.1 K/UL (ref 0–0.2)
BASOPHILS RELATIVE PERCENT: 0.4 %
BUN BLDV-MCNC: 17 MG/DL (ref 7–20)
CALCIUM SERPL-MCNC: 9 MG/DL (ref 8.3–10.6)
CHLORIDE BLD-SCNC: 109 MMOL/L (ref 99–110)
CO2: 22 MMOL/L (ref 21–32)
CREAT SERPL-MCNC: 1.1 MG/DL (ref 0.8–1.3)
EOSINOPHILS ABSOLUTE: 0 K/UL (ref 0–0.6)
EOSINOPHILS RELATIVE PERCENT: 0.1 %
GFR AFRICAN AMERICAN: >60
GFR NON-AFRICAN AMERICAN: >60
GLUCOSE BLD-MCNC: 152 MG/DL (ref 70–99)
HCT VFR BLD CALC: 43.1 % (ref 40.5–52.5)
HEMOGLOBIN: 14.3 G/DL (ref 13.5–17.5)
LYMPHOCYTES ABSOLUTE: 1.6 K/UL (ref 1–5.1)
LYMPHOCYTES RELATIVE PERCENT: 10.2 %
MCH RBC QN AUTO: 31 PG (ref 26–34)
MCHC RBC AUTO-ENTMCNC: 33.1 G/DL (ref 31–36)
MCV RBC AUTO: 93.7 FL (ref 80–100)
MONOCYTES ABSOLUTE: 0.8 K/UL (ref 0–1.3)
MONOCYTES RELATIVE PERCENT: 5 %
NEUTROPHILS ABSOLUTE: 13.4 K/UL (ref 1.7–7.7)
NEUTROPHILS RELATIVE PERCENT: 84.3 %
PDW BLD-RTO: 13.6 % (ref 12.4–15.4)
PLATELET # BLD: 191 K/UL (ref 135–450)
PMV BLD AUTO: 10.3 FL (ref 5–10.5)
POTASSIUM SERPL-SCNC: 4.5 MMOL/L (ref 3.5–5.1)
RBC # BLD: 4.6 M/UL (ref 4.2–5.9)
SODIUM BLD-SCNC: 143 MMOL/L (ref 136–145)
WBC # BLD: 15.9 K/UL (ref 4–11)

## 2018-12-14 PROCEDURE — 85025 COMPLETE CBC W/AUTO DIFF WBC: CPT

## 2018-12-14 PROCEDURE — 2580000003 HC RX 258: Performed by: INTERNAL MEDICINE

## 2018-12-14 PROCEDURE — 6370000000 HC RX 637 (ALT 250 FOR IP): Performed by: INTERNAL MEDICINE

## 2018-12-14 PROCEDURE — 80048 BASIC METABOLIC PNL TOTAL CA: CPT

## 2018-12-14 PROCEDURE — 36415 COLL VENOUS BLD VENIPUNCTURE: CPT

## 2018-12-14 RX ORDER — CEPHALEXIN 500 MG/1
500 CAPSULE ORAL 3 TIMES DAILY
Qty: 30 CAPSULE | Refills: 0 | Status: SHIPPED | OUTPATIENT
Start: 2018-12-14 | End: 2018-12-24

## 2018-12-14 RX ADMIN — ASPIRIN 81 MG 81 MG: 81 TABLET ORAL at 08:09

## 2018-12-14 RX ADMIN — AMLODIPINE BESYLATE 5 MG: 5 TABLET ORAL at 08:09

## 2018-12-14 RX ADMIN — Medication 10 ML: at 08:13

## 2018-12-14 RX ADMIN — LOSARTAN POTASSIUM 100 MG: 100 TABLET, FILM COATED ORAL at 08:09

## 2018-12-14 RX ADMIN — BISACODYL 10 MG: 10 SUPPOSITORY RECTAL at 06:04

## 2018-12-14 RX ADMIN — MAGNESIUM HYDROXIDE 30 ML: 400 SUSPENSION ORAL at 08:08

## 2018-12-14 RX ADMIN — TAMSULOSIN HYDROCHLORIDE 0.4 MG: 0.4 CAPSULE ORAL at 08:09

## 2018-12-14 RX ADMIN — PANTOPRAZOLE SODIUM 40 MG: 40 TABLET, DELAYED RELEASE ORAL at 06:04

## 2018-12-14 RX ADMIN — FOLIC ACID 500 MCG: 1 TABLET ORAL at 08:08

## 2018-12-14 RX ADMIN — TROSPIUM CHLORIDE 20 MG: 20 TABLET ORAL at 06:04

## 2018-12-14 NOTE — PROGRESS NOTES
New orders from  to discharge pt if okay with urology. You Johnson note states pt can be discharged. Call sent to [facility] to confirm pt can be discharged.

## 2018-12-18 LAB — BLOOD CULTURE, ROUTINE: NORMAL

## 2018-12-21 ENCOUNTER — OFFICE VISIT (OUTPATIENT)
Dept: INTERNAL MEDICINE CLINIC | Age: 73
End: 2018-12-21
Payer: MEDICARE

## 2018-12-21 VITALS
SYSTOLIC BLOOD PRESSURE: 110 MMHG | WEIGHT: 239 LBS | DIASTOLIC BLOOD PRESSURE: 60 MMHG | BODY MASS INDEX: 36.22 KG/M2 | RESPIRATION RATE: 16 BRPM | HEART RATE: 88 BPM

## 2018-12-21 DIAGNOSIS — N20.0 BILATERAL KIDNEY STONES: Primary | ICD-10-CM

## 2018-12-21 DIAGNOSIS — N13.2 URETERAL STONE WITH HYDRONEPHROSIS: ICD-10-CM

## 2018-12-21 PROCEDURE — G8428 CUR MEDS NOT DOCUMENT: HCPCS | Performed by: INTERNAL MEDICINE

## 2018-12-21 PROCEDURE — 1111F DSCHRG MED/CURRENT MED MERGE: CPT | Performed by: INTERNAL MEDICINE

## 2018-12-21 PROCEDURE — 1101F PT FALLS ASSESS-DOCD LE1/YR: CPT | Performed by: INTERNAL MEDICINE

## 2018-12-21 PROCEDURE — 1036F TOBACCO NON-USER: CPT | Performed by: INTERNAL MEDICINE

## 2018-12-21 PROCEDURE — 99213 OFFICE O/P EST LOW 20 MIN: CPT | Performed by: INTERNAL MEDICINE

## 2018-12-21 PROCEDURE — 1123F ACP DISCUSS/DSCN MKR DOCD: CPT | Performed by: INTERNAL MEDICINE

## 2018-12-21 PROCEDURE — G8417 CALC BMI ABV UP PARAM F/U: HCPCS | Performed by: INTERNAL MEDICINE

## 2018-12-21 PROCEDURE — 4040F PNEUMOC VAC/ADMIN/RCVD: CPT | Performed by: INTERNAL MEDICINE

## 2018-12-21 PROCEDURE — G8482 FLU IMMUNIZE ORDER/ADMIN: HCPCS | Performed by: INTERNAL MEDICINE

## 2018-12-21 PROCEDURE — 3017F COLORECTAL CA SCREEN DOC REV: CPT | Performed by: INTERNAL MEDICINE

## 2018-12-21 NOTE — PROGRESS NOTES
kidney stones    Ureteral stone with hydronephrosis        Additional Plan:  1. Avoid strenuous activity  2. Push fluids      Discussed medications with patient who voiced understanding of their use, indication and potential side effects. Pt also understands the above recommendations. All questions answered.

## 2019-01-02 ENCOUNTER — APPOINTMENT (OUTPATIENT)
Dept: GENERAL RADIOLOGY | Age: 74
End: 2019-01-02
Attending: UROLOGY
Payer: MEDICARE

## 2019-01-02 ENCOUNTER — ANESTHESIA EVENT (OUTPATIENT)
Dept: OPERATING ROOM | Age: 74
End: 2019-01-02
Payer: MEDICARE

## 2019-01-02 ENCOUNTER — HOSPITAL ENCOUNTER (OUTPATIENT)
Age: 74
Setting detail: OUTPATIENT SURGERY
Discharge: HOME | End: 2019-01-02
Attending: UROLOGY | Admitting: UROLOGY
Payer: MEDICARE

## 2019-01-02 ENCOUNTER — ANESTHESIA (OUTPATIENT)
Dept: OPERATING ROOM | Age: 74
End: 2019-01-02
Payer: MEDICARE

## 2019-01-02 VITALS
TEMPERATURE: 97 F | HEART RATE: 55 BPM | HEIGHT: 68 IN | WEIGHT: 240.7 LBS | OXYGEN SATURATION: 94 % | RESPIRATION RATE: 16 BRPM | SYSTOLIC BLOOD PRESSURE: 138 MMHG | BODY MASS INDEX: 36.48 KG/M2 | DIASTOLIC BLOOD PRESSURE: 88 MMHG

## 2019-01-02 VITALS
TEMPERATURE: 98.6 F | RESPIRATION RATE: 14 BRPM | DIASTOLIC BLOOD PRESSURE: 78 MMHG | SYSTOLIC BLOOD PRESSURE: 111 MMHG | OXYGEN SATURATION: 99 %

## 2019-01-02 DIAGNOSIS — N20.0 KIDNEY STONES: Primary | ICD-10-CM

## 2019-01-02 LAB
GLUCOSE BLD-MCNC: 118 MG/DL (ref 70–99)
PERFORMED ON: ABNORMAL

## 2019-01-02 PROCEDURE — 3600000004 HC SURGERY LEVEL 4 BASE: Performed by: UROLOGY

## 2019-01-02 PROCEDURE — 3700000000 HC ANESTHESIA ATTENDED CARE: Performed by: UROLOGY

## 2019-01-02 PROCEDURE — 6360000002 HC RX W HCPCS: Performed by: NURSE ANESTHETIST, CERTIFIED REGISTERED

## 2019-01-02 PROCEDURE — 7100000001 HC PACU RECOVERY - ADDTL 15 MIN: Performed by: UROLOGY

## 2019-01-02 PROCEDURE — 74420 UROGRAPHY RTRGR +-KUB: CPT

## 2019-01-02 PROCEDURE — 2720000010 HC SURG SUPPLY STERILE: Performed by: UROLOGY

## 2019-01-02 PROCEDURE — 2580000003 HC RX 258: Performed by: UROLOGY

## 2019-01-02 PROCEDURE — C2617 STENT, NON-COR, TEM W/O DEL: HCPCS | Performed by: UROLOGY

## 2019-01-02 PROCEDURE — 7100000011 HC PHASE II RECOVERY - ADDTL 15 MIN: Performed by: UROLOGY

## 2019-01-02 PROCEDURE — 2709999900 HC NON-CHARGEABLE SUPPLY: Performed by: UROLOGY

## 2019-01-02 PROCEDURE — 7100000000 HC PACU RECOVERY - FIRST 15 MIN: Performed by: UROLOGY

## 2019-01-02 PROCEDURE — C1894 INTRO/SHEATH, NON-LASER: HCPCS | Performed by: UROLOGY

## 2019-01-02 PROCEDURE — 2500000003 HC RX 250 WO HCPCS: Performed by: NURSE ANESTHETIST, CERTIFIED REGISTERED

## 2019-01-02 PROCEDURE — 82365 CALCULUS SPECTROSCOPY: CPT

## 2019-01-02 PROCEDURE — 3600000014 HC SURGERY LEVEL 4 ADDTL 15MIN: Performed by: UROLOGY

## 2019-01-02 PROCEDURE — 6360000004 HC RX CONTRAST MEDICATION: Performed by: UROLOGY

## 2019-01-02 PROCEDURE — 7100000010 HC PHASE II RECOVERY - FIRST 15 MIN: Performed by: UROLOGY

## 2019-01-02 PROCEDURE — C1773 RET DEV, INSERTABLE: HCPCS | Performed by: UROLOGY

## 2019-01-02 PROCEDURE — C1769 GUIDE WIRE: HCPCS | Performed by: UROLOGY

## 2019-01-02 PROCEDURE — 3700000001 HC ADD 15 MINUTES (ANESTHESIA): Performed by: UROLOGY

## 2019-01-02 PROCEDURE — 6360000002 HC RX W HCPCS: Performed by: UROLOGY

## 2019-01-02 DEVICE — URETERAL STENT
Type: IMPLANTABLE DEVICE | Site: URETER | Status: FUNCTIONAL
Brand: PERCUFLEX™ PLUS

## 2019-01-02 RX ORDER — PROPOFOL 10 MG/ML
INJECTION, EMULSION INTRAVENOUS PRN
Status: DISCONTINUED | OUTPATIENT
Start: 2019-01-02 | End: 2019-01-02 | Stop reason: SDUPTHER

## 2019-01-02 RX ORDER — DEXAMETHASONE SODIUM PHOSPHATE 4 MG/ML
INJECTION, SOLUTION INTRA-ARTICULAR; INTRALESIONAL; INTRAMUSCULAR; INTRAVENOUS; SOFT TISSUE PRN
Status: DISCONTINUED | OUTPATIENT
Start: 2019-01-02 | End: 2019-01-02 | Stop reason: SDUPTHER

## 2019-01-02 RX ORDER — SUCCINYLCHOLINE CHLORIDE 20 MG/ML
INJECTION INTRAMUSCULAR; INTRAVENOUS PRN
Status: DISCONTINUED | OUTPATIENT
Start: 2019-01-02 | End: 2019-01-02 | Stop reason: SDUPTHER

## 2019-01-02 RX ORDER — DOCUSATE SODIUM 100 MG/1
100 CAPSULE, LIQUID FILLED ORAL 2 TIMES DAILY
Qty: 60 CAPSULE | Refills: 0 | Status: SHIPPED | OUTPATIENT
Start: 2019-01-02 | End: 2019-01-14 | Stop reason: DRUGHIGH

## 2019-01-02 RX ORDER — MIDAZOLAM HYDROCHLORIDE 1 MG/ML
INJECTION INTRAMUSCULAR; INTRAVENOUS PRN
Status: DISCONTINUED | OUTPATIENT
Start: 2019-01-02 | End: 2019-01-02 | Stop reason: SDUPTHER

## 2019-01-02 RX ORDER — SODIUM CHLORIDE 9 MG/ML
INJECTION, SOLUTION INTRAVENOUS CONTINUOUS
Status: DISCONTINUED | OUTPATIENT
Start: 2019-01-02 | End: 2019-01-02 | Stop reason: HOSPADM

## 2019-01-02 RX ORDER — HYDROMORPHONE HCL 110MG/55ML
0.5 PATIENT CONTROLLED ANALGESIA SYRINGE INTRAVENOUS EVERY 5 MIN PRN
Status: DISCONTINUED | OUTPATIENT
Start: 2019-01-02 | End: 2019-01-02 | Stop reason: HOSPADM

## 2019-01-02 RX ORDER — LIDOCAINE HYDROCHLORIDE 20 MG/ML
INJECTION, SOLUTION INFILTRATION; PERINEURAL PRN
Status: DISCONTINUED | OUTPATIENT
Start: 2019-01-02 | End: 2019-01-02 | Stop reason: SDUPTHER

## 2019-01-02 RX ORDER — ONDANSETRON 2 MG/ML
INJECTION INTRAMUSCULAR; INTRAVENOUS PRN
Status: DISCONTINUED | OUTPATIENT
Start: 2019-01-02 | End: 2019-01-02 | Stop reason: SDUPTHER

## 2019-01-02 RX ORDER — FENTANYL CITRATE 50 UG/ML
INJECTION, SOLUTION INTRAMUSCULAR; INTRAVENOUS PRN
Status: DISCONTINUED | OUTPATIENT
Start: 2019-01-02 | End: 2019-01-02 | Stop reason: SDUPTHER

## 2019-01-02 RX ORDER — LIDOCAINE HYDROCHLORIDE 10 MG/ML
0.5 INJECTION, SOLUTION EPIDURAL; INFILTRATION; INTRACAUDAL; PERINEURAL ONCE
Status: DISCONTINUED | OUTPATIENT
Start: 2019-01-02 | End: 2019-01-02 | Stop reason: HOSPADM

## 2019-01-02 RX ORDER — HYDROCODONE BITARTRATE AND ACETAMINOPHEN 5; 325 MG/1; MG/1
1 TABLET ORAL
Status: DISCONTINUED | OUTPATIENT
Start: 2019-01-02 | End: 2019-01-02 | Stop reason: HOSPADM

## 2019-01-02 RX ORDER — HYDROMORPHONE HCL 110MG/55ML
0.25 PATIENT CONTROLLED ANALGESIA SYRINGE INTRAVENOUS EVERY 5 MIN PRN
Status: DISCONTINUED | OUTPATIENT
Start: 2019-01-02 | End: 2019-01-02 | Stop reason: HOSPADM

## 2019-01-02 RX ORDER — CIPROFLOXACIN 2 MG/ML
400 INJECTION, SOLUTION INTRAVENOUS
Status: COMPLETED | OUTPATIENT
Start: 2019-01-02 | End: 2019-01-02

## 2019-01-02 RX ORDER — HYDROCODONE BITARTRATE AND ACETAMINOPHEN 5; 325 MG/1; MG/1
1 TABLET ORAL EVERY 6 HOURS PRN
Qty: 20 TABLET | Refills: 0 | Status: SHIPPED | OUTPATIENT
Start: 2019-01-02 | End: 2019-01-07

## 2019-01-02 RX ORDER — ONDANSETRON 2 MG/ML
4 INJECTION INTRAMUSCULAR; INTRAVENOUS
Status: DISCONTINUED | OUTPATIENT
Start: 2019-01-02 | End: 2019-01-02 | Stop reason: HOSPADM

## 2019-01-02 RX ORDER — ROCURONIUM BROMIDE 10 MG/ML
INJECTION, SOLUTION INTRAVENOUS PRN
Status: DISCONTINUED | OUTPATIENT
Start: 2019-01-02 | End: 2019-01-02 | Stop reason: SDUPTHER

## 2019-01-02 RX ORDER — MAGNESIUM HYDROXIDE 1200 MG/15ML
LIQUID ORAL
Status: COMPLETED | OUTPATIENT
Start: 2019-01-02 | End: 2019-01-02

## 2019-01-02 RX ORDER — LIDOCAINE HYDROCHLORIDE 10 MG/ML
1 INJECTION, SOLUTION EPIDURAL; INFILTRATION; INTRACAUDAL; PERINEURAL
Status: DISCONTINUED | OUTPATIENT
Start: 2019-01-02 | End: 2019-01-02 | Stop reason: HOSPADM

## 2019-01-02 RX ADMIN — ROCURONIUM BROMIDE 30 MG: 10 INJECTION, SOLUTION INTRAVENOUS at 09:52

## 2019-01-02 RX ADMIN — SODIUM CHLORIDE: 9 INJECTION, SOLUTION INTRAVENOUS at 09:33

## 2019-01-02 RX ADMIN — ONDANSETRON 4 MG: 2 INJECTION INTRAMUSCULAR; INTRAVENOUS at 09:52

## 2019-01-02 RX ADMIN — LIDOCAINE HYDROCHLORIDE 100 MG: 20 INJECTION, SOLUTION INFILTRATION; PERINEURAL at 09:47

## 2019-01-02 RX ADMIN — DEXAMETHASONE SODIUM PHOSPHATE 4 MG: 4 INJECTION, SOLUTION INTRAMUSCULAR; INTRAVENOUS at 09:52

## 2019-01-02 RX ADMIN — SUCCINYLCHOLINE CHLORIDE 160 MG: 20 INJECTION, SOLUTION INTRAMUSCULAR; INTRAVENOUS at 09:47

## 2019-01-02 RX ADMIN — PHENYLEPHRINE HYDROCHLORIDE 100 MCG: 10 INJECTION INTRAVENOUS at 10:04

## 2019-01-02 RX ADMIN — MIDAZOLAM HYDROCHLORIDE 1 MG: 1 INJECTION, SOLUTION INTRAMUSCULAR; INTRAVENOUS at 09:44

## 2019-01-02 RX ADMIN — SUGAMMADEX 200 MG: 100 INJECTION, SOLUTION INTRAVENOUS at 10:28

## 2019-01-02 RX ADMIN — FENTANYL CITRATE 50 MCG: 50 INJECTION, SOLUTION INTRAMUSCULAR; INTRAVENOUS at 10:32

## 2019-01-02 RX ADMIN — FENTANYL CITRATE 50 MCG: 50 INJECTION, SOLUTION INTRAMUSCULAR; INTRAVENOUS at 09:47

## 2019-01-02 RX ADMIN — CIPROFLOXACIN 400 MG: 2 INJECTION, SOLUTION INTRAVENOUS at 09:34

## 2019-01-02 RX ADMIN — PROPOFOL 150 MG: 10 INJECTION, EMULSION INTRAVENOUS at 09:47

## 2019-01-02 ASSESSMENT — PULMONARY FUNCTION TESTS
PIF_VALUE: 0
PIF_VALUE: 0
PIF_VALUE: 21
PIF_VALUE: 21
PIF_VALUE: 15
PIF_VALUE: 21
PIF_VALUE: 15
PIF_VALUE: 15
PIF_VALUE: 21
PIF_VALUE: 32
PIF_VALUE: 14
PIF_VALUE: 3
PIF_VALUE: 1
PIF_VALUE: 4
PIF_VALUE: 5
PIF_VALUE: 15
PIF_VALUE: 14
PIF_VALUE: 2
PIF_VALUE: 21
PIF_VALUE: 0
PIF_VALUE: 2
PIF_VALUE: 21
PIF_VALUE: 21
PIF_VALUE: 14
PIF_VALUE: 21
PIF_VALUE: 19
PIF_VALUE: 4
PIF_VALUE: 15
PIF_VALUE: 34
PIF_VALUE: 1
PIF_VALUE: 21
PIF_VALUE: 21
PIF_VALUE: 15
PIF_VALUE: 0
PIF_VALUE: 21
PIF_VALUE: 21
PIF_VALUE: 16
PIF_VALUE: 15
PIF_VALUE: 21
PIF_VALUE: 15
PIF_VALUE: 21
PIF_VALUE: 21
PIF_VALUE: 5
PIF_VALUE: 15
PIF_VALUE: 15
PIF_VALUE: 0
PIF_VALUE: 15
PIF_VALUE: 21
PIF_VALUE: 21
PIF_VALUE: 14
PIF_VALUE: 3
PIF_VALUE: 10
PIF_VALUE: 21
PIF_VALUE: 19
PIF_VALUE: 15

## 2019-01-02 ASSESSMENT — PAIN SCALES - GENERAL: PAINLEVEL_OUTOF10: 0

## 2019-01-02 ASSESSMENT — ENCOUNTER SYMPTOMS: SHORTNESS OF BREATH: 0

## 2019-01-02 ASSESSMENT — PAIN - FUNCTIONAL ASSESSMENT: PAIN_FUNCTIONAL_ASSESSMENT: 0-10

## 2019-01-04 LAB
CALCULI COMPOSITION: NORMAL
MASS: 118 MG
STONE DESCRIPTION: NORMAL
STONE NUMBER: 5
STONE SIZE: NORMAL MM

## 2019-01-14 ENCOUNTER — OFFICE VISIT (OUTPATIENT)
Dept: INTERNAL MEDICINE CLINIC | Age: 74
End: 2019-01-14
Payer: MEDICARE

## 2019-01-14 ENCOUNTER — TELEPHONE (OUTPATIENT)
Dept: INTERNAL MEDICINE CLINIC | Age: 74
End: 2019-01-14

## 2019-01-14 VITALS
HEART RATE: 74 BPM | WEIGHT: 239 LBS | DIASTOLIC BLOOD PRESSURE: 76 MMHG | SYSTOLIC BLOOD PRESSURE: 124 MMHG | BODY MASS INDEX: 36.34 KG/M2 | RESPIRATION RATE: 16 BRPM

## 2019-01-14 DIAGNOSIS — K59.00 CONSTIPATION, UNSPECIFIED CONSTIPATION TYPE: ICD-10-CM

## 2019-01-14 DIAGNOSIS — K62.5 BRBPR (BRIGHT RED BLOOD PER RECTUM): Primary | ICD-10-CM

## 2019-01-14 LAB
HEMOCCULT STL QL: NORMAL

## 2019-01-14 PROCEDURE — G8427 DOCREV CUR MEDS BY ELIG CLIN: HCPCS | Performed by: INTERNAL MEDICINE

## 2019-01-14 PROCEDURE — 82270 OCCULT BLOOD FECES: CPT | Performed by: INTERNAL MEDICINE

## 2019-01-14 PROCEDURE — 1123F ACP DISCUSS/DSCN MKR DOCD: CPT | Performed by: INTERNAL MEDICINE

## 2019-01-14 PROCEDURE — G8482 FLU IMMUNIZE ORDER/ADMIN: HCPCS | Performed by: INTERNAL MEDICINE

## 2019-01-14 PROCEDURE — G8417 CALC BMI ABV UP PARAM F/U: HCPCS | Performed by: INTERNAL MEDICINE

## 2019-01-14 PROCEDURE — 1036F TOBACCO NON-USER: CPT | Performed by: INTERNAL MEDICINE

## 2019-01-14 PROCEDURE — 3017F COLORECTAL CA SCREEN DOC REV: CPT | Performed by: INTERNAL MEDICINE

## 2019-01-14 PROCEDURE — 4040F PNEUMOC VAC/ADMIN/RCVD: CPT | Performed by: INTERNAL MEDICINE

## 2019-01-14 PROCEDURE — 1101F PT FALLS ASSESS-DOCD LE1/YR: CPT | Performed by: INTERNAL MEDICINE

## 2019-01-14 PROCEDURE — 99213 OFFICE O/P EST LOW 20 MIN: CPT | Performed by: INTERNAL MEDICINE

## 2019-01-14 RX ORDER — DOCUSATE SODIUM 100 MG/1
200 CAPSULE, LIQUID FILLED ORAL DAILY
Qty: 60 CAPSULE | Refills: 0 | Status: SHIPPED | OUTPATIENT
Start: 2019-01-14 | End: 2019-01-29

## 2019-01-22 ENCOUNTER — TELEPHONE (OUTPATIENT)
Dept: INTERNAL MEDICINE CLINIC | Age: 74
End: 2019-01-22

## 2019-02-11 ENCOUNTER — HOSPITAL ENCOUNTER (OUTPATIENT)
Dept: ULTRASOUND IMAGING | Age: 74
Discharge: HOME | End: 2019-02-11
Payer: MEDICARE

## 2019-02-11 DIAGNOSIS — N20.0 KIDNEY CALCULI: ICD-10-CM

## 2019-02-11 PROCEDURE — 76770 US EXAM ABDO BACK WALL COMP: CPT

## 2019-05-08 ENCOUNTER — OFFICE VISIT (OUTPATIENT)
Dept: INTERNAL MEDICINE CLINIC | Age: 74
End: 2019-05-08
Payer: MEDICARE

## 2019-05-08 VITALS
WEIGHT: 245 LBS | SYSTOLIC BLOOD PRESSURE: 115 MMHG | DIASTOLIC BLOOD PRESSURE: 76 MMHG | HEIGHT: 68 IN | BODY MASS INDEX: 37.13 KG/M2 | RESPIRATION RATE: 16 BRPM | HEART RATE: 62 BPM

## 2019-05-08 DIAGNOSIS — E11.9 TYPE 2 DIABETES MELLITUS WITHOUT COMPLICATION, WITHOUT LONG-TERM CURRENT USE OF INSULIN (HCC): Primary | ICD-10-CM

## 2019-05-08 DIAGNOSIS — I10 BENIGN ESSENTIAL HTN: ICD-10-CM

## 2019-05-08 DIAGNOSIS — E78.5 DYSLIPIDEMIA: ICD-10-CM

## 2019-05-08 DIAGNOSIS — K21.9 GASTROESOPHAGEAL REFLUX DISEASE WITHOUT ESOPHAGITIS: ICD-10-CM

## 2019-05-08 DIAGNOSIS — R60.0 BILATERAL LEG EDEMA: ICD-10-CM

## 2019-05-08 DIAGNOSIS — Z23 NEED FOR TDAP VACCINATION: ICD-10-CM

## 2019-05-08 PROCEDURE — 1036F TOBACCO NON-USER: CPT | Performed by: INTERNAL MEDICINE

## 2019-05-08 PROCEDURE — G8427 DOCREV CUR MEDS BY ELIG CLIN: HCPCS | Performed by: INTERNAL MEDICINE

## 2019-05-08 PROCEDURE — 2022F DILAT RTA XM EVC RTNOPTHY: CPT | Performed by: INTERNAL MEDICINE

## 2019-05-08 PROCEDURE — 4040F PNEUMOC VAC/ADMIN/RCVD: CPT | Performed by: INTERNAL MEDICINE

## 2019-05-08 PROCEDURE — 99214 OFFICE O/P EST MOD 30 MIN: CPT | Performed by: INTERNAL MEDICINE

## 2019-05-08 PROCEDURE — G8417 CALC BMI ABV UP PARAM F/U: HCPCS | Performed by: INTERNAL MEDICINE

## 2019-05-08 PROCEDURE — 3046F HEMOGLOBIN A1C LEVEL >9.0%: CPT | Performed by: INTERNAL MEDICINE

## 2019-05-08 PROCEDURE — 3017F COLORECTAL CA SCREEN DOC REV: CPT | Performed by: INTERNAL MEDICINE

## 2019-05-08 PROCEDURE — 1123F ACP DISCUSS/DSCN MKR DOCD: CPT | Performed by: INTERNAL MEDICINE

## 2019-05-08 NOTE — PROGRESS NOTES
[facility]) Physicians  Internal Medicine  Patient Encounter  Sarah Burden D.O., [facility]        Chief Complaint   Patient presents with    Medication Check    Check-Up     HPI  Patient ID: Elba Hayes is a 74 y.o. male seen today for follow up regarding the status of his current chronic medical problems below along with medication review. He offers no new concerns. He has been feeling well. Vaccines overdue:  Shingles  Tdap    Diabetes Mellitus Type II, Follow-up--   Lab Results   Component Value Date    LABA1C 6.3 11/06/2018      Lab Results   Component Value Date    .1 11/06/2018     After a long-standing history of metabolic syndrome and impaired fasting glucose and impaired glucose tolerance his A1c has recurrently then above 6.5%. This occurred on at least 3 separate occasions over the last couple of years. He is asymptomatic. He denies any excessive thirst or frequent urination. Patient has really struggled maintaining any meaningful weight loss. Last Eye Exam: 5/29/2018,   Exam is due this month. U.Microalbumin/Cr: 5/4/2018, overdue  Pt is on an ARB. Complications-- none  Insulin Treated? No.    ASA: Yes. Tobacco: No   Foot exam-- overdue    HTN--  Patient remains on Losartan 100 mg daily and amlodipine 5 mg daily. He's had no adverse side effects. He denies any headaches or lightheadedness. He denies any orthostatic dizziness. He denies any increased swelling. He does have some swelling as the day progresses. Hyperlipidemia--   Lab Results   Component Value Date    LDLCALC 66 11/06/2018     Patient is on pravastatin 80 mg nightly as well as Zetia 10 mg daily. He denies myalgias. GERD-- Unfortunately H2 blockers have been ineffective. He remains on omeprazole 40 mg daily. He denies any dysphagia. He's had no further rectal bleeding. Constipation improved. Lab Results   Component Value Date    MG 2.10 11/06/2018          PSA due in July per urology. Past Medical History:   Diagnosis Date    BPH     Colon polyps     DDD (degenerative disc disease), lumbar     Diverticulosis     Gallstone pancreatitis 09/05/2017    GERD (gastroesophageal reflux disease)     Hyperlipidemia     Hypertension     Impaired fasting blood sugar     Kidney stones 11/10    Lumbar spondylosis     OAB (overactive bladder)     Osteoarthritis     Osteoarthritis of right knee     Pneumonia     pneumonia    RBBB 12/19/2016    Renal cyst     Retinal vasculitis     Retinal vasculitis     Right ureteral stone     Type 2 diabetes mellitus without complication, without long-term current use of insulin (HCC)     Type 2 diabetes mellitus without complication, without long-term current use of insulin (HCC)        Review of Systems--   As per HPI      Physical Exam  /76   Pulse 62   Resp 16   Ht 5' 8\" (1.727 m)   Wt 245 lb (111.1 kg)   BMI 37.25 kg/m²     Wt Readings from Last 3 Encounters:   05/08/19 245 lb (111.1 kg)   01/14/19 239 lb (108.4 kg)   01/02/19 240 lb 11.2 oz (109.2 kg)       GEN: A&O, Obese. HEENT: NUBIA, EOMI, oral cavity is clear with no mucosal lesions. Mucous members are moist.  Throat NL. NECK: Supple, No JVD, no thyromegaly. LYMPH:  No C/SC Lymphadenopathy  CV:  Reg rhythm, Rate NL. No murmurs. No ectopy. VASC: No carotid bruits. Pedal pulses symmetrical  PULM: CTA. GI:  Abd Soft. NT,  ND, BS +, No masses. EXT: 1+ BL LE edema. SKIN: No rashes. Feet normal color and temperature, no large calluses, ulcers or wounds   NEURO: Monofilament testing intact both feet. Vibratory sensation diminished in both feet. ASSESSMENT/PLAN:    1. Type 2 diabetes mellitus without complication, without long-term current use of insulin ([address]  Condition is well controlled  Recheck lab  Continue to stay physically active and following ADA diet.   Patient was counseled on different food choices  Recommend he update his diabetic retinal eye exam and 90 neck Scotty will months.  -  DIABETES FOOT EXAM  - Comprehensive Metabolic Panel; Future  - Lipid Panel; Future  - Hemoglobin A1C; Future  - TSH without Reflex; Future    2. Need for Tdap vaccination    - Tetanus-Diphth-Acell Pertussis ([address]) 5-2.5-18.5 LF-MCG/0.5 injection; Inject 0.5 mLs into the muscle once for 1 dose  Dispense: 1 vial; Refill: 0    3. Dyslipidemia  Condition stability and control are uncertain at this time. Due for lab  Continue statin therapy for cardiovascular risk reduction. He'll continue Zetia for additional LDL reduction  - Comprehensive Metabolic Panel; Future  - Lipid Panel; Future    4. Benign essential HTN  Blood pressure is well controlled  Continue current medication regimen. Stay well hydrated and avoid hypotension.  - Comprehensive Metabolic Panel; Future  - Lipid Panel; Future    5. Gastroesophageal reflux disease without esophagitis  Condition is well controlled  Continue omeprazole. Continue to monitor renal function and magnesium levels    6. Bilateral leg edema  This is likely on the basis of venous insufficiency  Counseled on a no added salt diet  Recommend venous compression socks. He can start with the over-the-counter but may need prescription compression  - Comprehensive Metabolic Panel;  Future

## 2019-06-10 ENCOUNTER — OFFICE VISIT (OUTPATIENT)
Dept: INTERNAL MEDICINE CLINIC | Age: 74
End: 2019-06-10

## 2019-06-10 DIAGNOSIS — E11.9 TYPE 2 DIABETES MELLITUS WITHOUT COMPLICATION, WITHOUT LONG-TERM CURRENT USE OF INSULIN (HCC): Primary | ICD-10-CM

## 2019-06-10 PROCEDURE — 99999 PR OFFICE/OUTPT VISIT,PROCEDURE ONLY: CPT | Performed by: DIETITIAN, REGISTERED

## 2019-06-10 NOTE — PATIENT INSTRUCTIONS
BEHAVIOR GOALS     When to eat: Every five hours while awake. (Eat small breakfast within one hour of waking)    What and how much to eat:   1) Plan meals to follow Plate Guide, aiming for three servings of vegetables, fruits, and whole grains daily.   2) Aim for 45 g carb per meal    Physical Activity: Add bouts of activity on the three days you don't exercise in the water    Other: Wait 20 minutes before eating seconds

## 2019-06-10 NOTE — PROGRESS NOTES
Arthritis    GERD (gastroesophageal reflux disease)    Diverticulosis    Osteoarthritis    Benign prostatic hyperplasia    Kidney stones    Anemia    Polyp, colonic    AUDIE (obstructive sleep apnea)    Benign essential HTN    Metabolic syndrome    Dyslipidemia    Bilateral leg edema    RBBB    Insomnia    Type 2 diabetes mellitus without complication, without long-term current use of insulin (HCC)    Hydronephrosis    Ureteral stone with hydronephrosis    Left ureteral stone       Current Outpatient Medications   Medication Sig Dispense Refill    pravastatin (PRAVACHOL) 80 MG tablet Take 1 tablet by mouth every evening 90 tablet 3    losartan (COZAAR) 100 MG tablet Take 1 tablet by mouth daily 90 tablet 3    ezetimibe (ZETIA) 10 MG tablet Take 1 tablet by mouth daily 90 tablet 3    amLODIPine (NORVASC) 5 MG tablet Take 1 tablet by mouth daily 90 tablet 3    solifenacin (VESICARE) 10 MG tablet Take 5 mg by mouth daily      mometasone (NASONEX) 50 MCG/ACT nasal spray 2 sprays by Nasal route daily 3 Inhaler 4    omeprazole (PRILOSEC) 40 MG delayed release capsule Take 1 capsule by mouth daily 90 capsule 3    tamsulosin (FLOMAX) 0.4 MG capsule Take 1 capsule by mouth daily 90 capsule 3    folic acid (FOLVITE) 692 MCG tablet Take 400 mcg by mouth daily      aspirin 81 MG tablet Take 81 mg by mouth daily      Cholecalciferol (VITAMIN D) 2000 UNITS CAPS capsule Take  by mouth.  Glucosamine HCl-MSM (GLUCOSAMINE-MSM PO) Take 1 tablet by mouth 2 times daily        No current facility-administered medications for this visit.           NUTRITION ASSESSMENT    Biochemical Data:    Lab Results   Component Value Date    LABA1C 7.2 05/08/2019     Lab Results   Component Value Date    .9 05/08/2019       Lab Results   Component Value Date    CHOL 140 05/08/2019    CHOL 143 11/06/2018    CHOL 134 05/04/2018     Lab Results   Component Value Date    TRIG 135 05/08/2019    TRIG 179 (H) 11/06/2018 patient: 45 minutes

## 2019-07-25 ENCOUNTER — APPOINTMENT (OUTPATIENT)
Dept: CT IMAGING | Age: 74
End: 2019-07-25
Payer: MEDICARE

## 2019-07-25 ENCOUNTER — TELEPHONE (OUTPATIENT)
Dept: INTERNAL MEDICINE CLINIC | Age: 74
End: 2019-07-25

## 2019-07-25 ENCOUNTER — APPOINTMENT (OUTPATIENT)
Dept: GENERAL RADIOLOGY | Age: 74
End: 2019-07-25
Payer: MEDICARE

## 2019-07-25 ENCOUNTER — HOSPITAL ENCOUNTER (OUTPATIENT)
Age: 74
Setting detail: OBSERVATION
Discharge: HOME | End: 2019-07-26
Attending: EMERGENCY MEDICINE | Admitting: INTERNAL MEDICINE
Payer: MEDICARE

## 2019-07-25 DIAGNOSIS — R29.818: Primary | ICD-10-CM

## 2019-07-25 DIAGNOSIS — R53.1 RIGHT SIDED WEAKNESS: ICD-10-CM

## 2019-07-25 PROBLEM — N20.1 LEFT URETERAL STONE: Status: RESOLVED | Noted: 2018-12-13 | Resolved: 2019-07-25

## 2019-07-25 PROBLEM — N13.2 URETERAL STONE WITH HYDRONEPHROSIS: Status: RESOLVED | Noted: 2018-12-13 | Resolved: 2019-07-25

## 2019-07-25 PROBLEM — G45.9 TIA (TRANSIENT ISCHEMIC ATTACK): Status: ACTIVE | Noted: 2019-07-25

## 2019-07-25 PROBLEM — G81.91 RIGHT HEMIPARESIS (HCC): Status: ACTIVE | Noted: 2019-07-25

## 2019-07-25 PROBLEM — N13.30 HYDRONEPHROSIS: Status: RESOLVED | Noted: 2018-12-13 | Resolved: 2019-07-25

## 2019-07-25 LAB
A/G RATIO: 1.1 (ref 1.1–2.2)
ALBUMIN SERPL-MCNC: 3.9 G/DL (ref 3.4–5)
ALP BLD-CCNC: 97 U/L (ref 40–129)
ALT SERPL-CCNC: 23 U/L (ref 10–40)
ANION GAP SERPL CALCULATED.3IONS-SCNC: 12 MMOL/L (ref 3–16)
APTT: 28.5 SEC (ref 26–36)
AST SERPL-CCNC: 22 U/L (ref 15–37)
BASOPHILS ABSOLUTE: 0.1 K/UL (ref 0–0.2)
BASOPHILS RELATIVE PERCENT: 0.9 %
BILIRUB SERPL-MCNC: 0.4 MG/DL (ref 0–1)
BILIRUBIN URINE: NEGATIVE
BLOOD, URINE: NEGATIVE
BUN BLDV-MCNC: 12 MG/DL (ref 7–20)
CALCIUM SERPL-MCNC: 9.6 MG/DL (ref 8.3–10.6)
CHLORIDE BLD-SCNC: 110 MMOL/L (ref 99–110)
CLARITY: CLEAR
CO2: 24 MMOL/L (ref 21–32)
COLOR: YELLOW
CREAT SERPL-MCNC: 1.1 MG/DL (ref 0.8–1.3)
EKG ATRIAL RATE: 74 BPM
EKG DIAGNOSIS: NORMAL
EKG P AXIS: 34 DEGREES
EKG P-R INTERVAL: 198 MS
EKG Q-T INTERVAL: 446 MS
EKG QRS DURATION: 144 MS
EKG QTC CALCULATION (BAZETT): 495 MS
EKG R AXIS: -36 DEGREES
EKG T AXIS: 8 DEGREES
EKG VENTRICULAR RATE: 74 BPM
EOSINOPHILS ABSOLUTE: 0.3 K/UL (ref 0–0.6)
EOSINOPHILS RELATIVE PERCENT: 4.4 %
ETHANOL: 19 MG/DL (ref 0–0.08)
GFR AFRICAN AMERICAN: >60
GFR NON-AFRICAN AMERICAN: >60
GLOBULIN: 3.5 G/DL
GLUCOSE BLD-MCNC: 102 MG/DL (ref 70–99)
GLUCOSE BLD-MCNC: 105 MG/DL (ref 70–99)
GLUCOSE BLD-MCNC: 114 MG/DL (ref 70–99)
GLUCOSE URINE: NEGATIVE MG/DL
HCT VFR BLD CALC: 45.2 % (ref 40.5–52.5)
HEMOGLOBIN: 15.2 G/DL (ref 13.5–17.5)
INR BLD: 1 (ref 0.86–1.14)
KETONES, URINE: NEGATIVE MG/DL
LEUKOCYTE ESTERASE, URINE: NEGATIVE
LYMPHOCYTES ABSOLUTE: 2.2 K/UL (ref 1–5.1)
LYMPHOCYTES RELATIVE PERCENT: 35.4 %
MCH RBC QN AUTO: 31.5 PG (ref 26–34)
MCHC RBC AUTO-ENTMCNC: 33.6 G/DL (ref 31–36)
MCV RBC AUTO: 93.9 FL (ref 80–100)
MICROSCOPIC EXAMINATION: NORMAL
MONOCYTES ABSOLUTE: 0.6 K/UL (ref 0–1.3)
MONOCYTES RELATIVE PERCENT: 9.2 %
NEUTROPHILS ABSOLUTE: 3.1 K/UL (ref 1.7–7.7)
NEUTROPHILS RELATIVE PERCENT: 50.1 %
NITRITE, URINE: NEGATIVE
PDW BLD-RTO: 13.8 % (ref 12.4–15.4)
PERFORMED ON: ABNORMAL
PERFORMED ON: ABNORMAL
PH UA: 5.5 (ref 5–8)
PLATELET # BLD: 175 K/UL (ref 135–450)
PMV BLD AUTO: 10.4 FL (ref 5–10.5)
POTASSIUM REFLEX MAGNESIUM: 4.4 MMOL/L (ref 3.5–5.1)
PROTEIN UA: NEGATIVE MG/DL
PROTHROMBIN TIME: 11.4 SEC (ref 9.8–13)
RBC # BLD: 4.81 M/UL (ref 4.2–5.9)
SODIUM BLD-SCNC: 146 MMOL/L (ref 136–145)
SPECIFIC GRAVITY UA: 1.03 (ref 1–1.03)
TOTAL PROTEIN: 7.4 G/DL (ref 6.4–8.2)
TROPONIN: <0.01 NG/ML
URINE REFLEX TO CULTURE: NORMAL
URINE TYPE: NORMAL
UROBILINOGEN, URINE: 0.2 E.U./DL
WBC # BLD: 6.2 K/UL (ref 4–11)

## 2019-07-25 PROCEDURE — 85610 PROTHROMBIN TIME: CPT

## 2019-07-25 PROCEDURE — 70450 CT HEAD/BRAIN W/O DYE: CPT

## 2019-07-25 PROCEDURE — 80053 COMPREHEN METABOLIC PANEL: CPT

## 2019-07-25 PROCEDURE — 96360 HYDRATION IV INFUSION INIT: CPT

## 2019-07-25 PROCEDURE — 93005 ELECTROCARDIOGRAM TRACING: CPT | Performed by: PHYSICIAN ASSISTANT

## 2019-07-25 PROCEDURE — 93010 ELECTROCARDIOGRAM REPORT: CPT | Performed by: INTERNAL MEDICINE

## 2019-07-25 PROCEDURE — 6370000000 HC RX 637 (ALT 250 FOR IP): Performed by: INTERNAL MEDICINE

## 2019-07-25 PROCEDURE — 96361 HYDRATE IV INFUSION ADD-ON: CPT

## 2019-07-25 PROCEDURE — G0378 HOSPITAL OBSERVATION PER HR: HCPCS

## 2019-07-25 PROCEDURE — 2580000003 HC RX 258: Performed by: PHYSICIAN ASSISTANT

## 2019-07-25 PROCEDURE — 70498 CT ANGIOGRAPHY NECK: CPT

## 2019-07-25 PROCEDURE — 71045 X-RAY EXAM CHEST 1 VIEW: CPT

## 2019-07-25 PROCEDURE — G0480 DRUG TEST DEF 1-7 CLASSES: HCPCS

## 2019-07-25 PROCEDURE — 6370000000 HC RX 637 (ALT 250 FOR IP): Performed by: PHYSICIAN ASSISTANT

## 2019-07-25 PROCEDURE — 85730 THROMBOPLASTIN TIME PARTIAL: CPT

## 2019-07-25 PROCEDURE — 36415 COLL VENOUS BLD VENIPUNCTURE: CPT

## 2019-07-25 PROCEDURE — 6360000004 HC RX CONTRAST MEDICATION: Performed by: PHYSICIAN ASSISTANT

## 2019-07-25 PROCEDURE — 85025 COMPLETE CBC W/AUTO DIFF WBC: CPT

## 2019-07-25 PROCEDURE — 84484 ASSAY OF TROPONIN QUANT: CPT

## 2019-07-25 PROCEDURE — 99285 EMERGENCY DEPT VISIT HI MDM: CPT

## 2019-07-25 PROCEDURE — 81003 URINALYSIS AUTO W/O SCOPE: CPT

## 2019-07-25 PROCEDURE — 70496 CT ANGIOGRAPHY HEAD: CPT

## 2019-07-25 RX ORDER — PRAVASTATIN SODIUM 80 MG/1
80 TABLET ORAL EVERY EVENING
Status: DISCONTINUED | OUTPATIENT
Start: 2019-07-25 | End: 2019-07-26 | Stop reason: HOSPADM

## 2019-07-25 RX ORDER — TAMSULOSIN HYDROCHLORIDE 0.4 MG/1
0.4 CAPSULE ORAL DAILY
Status: DISCONTINUED | OUTPATIENT
Start: 2019-07-25 | End: 2019-07-26 | Stop reason: HOSPADM

## 2019-07-25 RX ORDER — FOLIC ACID 1 MG/1
500 TABLET ORAL DAILY
Status: DISCONTINUED | OUTPATIENT
Start: 2019-07-26 | End: 2019-07-26 | Stop reason: HOSPADM

## 2019-07-25 RX ORDER — AMLODIPINE BESYLATE 5 MG/1
5 TABLET ORAL DAILY
Status: DISCONTINUED | OUTPATIENT
Start: 2019-07-26 | End: 2019-07-26 | Stop reason: HOSPADM

## 2019-07-25 RX ORDER — LOSARTAN POTASSIUM 100 MG/1
100 TABLET ORAL DAILY
Status: DISCONTINUED | OUTPATIENT
Start: 2019-07-26 | End: 2019-07-26 | Stop reason: HOSPADM

## 2019-07-25 RX ORDER — EZETIMIBE 10 MG/1
10 TABLET ORAL DAILY
Status: DISCONTINUED | OUTPATIENT
Start: 2019-07-25 | End: 2019-07-25 | Stop reason: CLARIF

## 2019-07-25 RX ORDER — GLUCOSAMINE/CHONDR SU A SOD 1500-1200
1 LIQUID (ML) ORAL 2 TIMES DAILY
Status: DISCONTINUED | OUTPATIENT
Start: 2019-07-25 | End: 2019-07-25 | Stop reason: CLARIF

## 2019-07-25 RX ORDER — PANTOPRAZOLE SODIUM 40 MG/1
40 TABLET, DELAYED RELEASE ORAL
Status: DISCONTINUED | OUTPATIENT
Start: 2019-07-26 | End: 2019-07-26 | Stop reason: HOSPADM

## 2019-07-25 RX ORDER — ASPIRIN 81 MG/1
324 TABLET, CHEWABLE ORAL ONCE
Status: COMPLETED | OUTPATIENT
Start: 2019-07-25 | End: 2019-07-25

## 2019-07-25 RX ORDER — ASPIRIN 81 MG/1
81 TABLET ORAL DAILY
Status: DISCONTINUED | OUTPATIENT
Start: 2019-07-25 | End: 2019-07-26 | Stop reason: HOSPADM

## 2019-07-25 RX ORDER — TROSPIUM CHLORIDE 20 MG/1
20 TABLET, FILM COATED ORAL
Status: DISCONTINUED | OUTPATIENT
Start: 2019-07-25 | End: 2019-07-26 | Stop reason: HOSPADM

## 2019-07-25 RX ORDER — 0.9 % SODIUM CHLORIDE 0.9 %
500 INTRAVENOUS SOLUTION INTRAVENOUS ONCE
Status: COMPLETED | OUTPATIENT
Start: 2019-07-25 | End: 2019-07-25

## 2019-07-25 RX ADMIN — SODIUM CHLORIDE 500 ML: 9 INJECTION, SOLUTION INTRAVENOUS at 11:11

## 2019-07-25 RX ADMIN — ASPIRIN 81 MG 324 MG: 81 TABLET ORAL at 11:49

## 2019-07-25 RX ADMIN — ASPIRIN 81 MG: 81 TABLET ORAL at 22:52

## 2019-07-25 RX ADMIN — TAMSULOSIN HYDROCHLORIDE 0.4 MG: 0.4 CAPSULE ORAL at 22:52

## 2019-07-25 RX ADMIN — PRAVASTATIN SODIUM 80 MG: 80 TABLET ORAL at 22:52

## 2019-07-25 RX ADMIN — IOPAMIDOL 75 ML: 755 INJECTION, SOLUTION INTRAVENOUS at 10:53

## 2019-07-25 RX ADMIN — TROSPIUM CHLORIDE 20 MG: 20 TABLET, FILM COATED ORAL at 17:19

## 2019-07-25 ASSESSMENT — ENCOUNTER SYMPTOMS
ABDOMINAL PAIN: 0
VOMITING: 0
SHORTNESS OF BREATH: 0
NAUSEA: 0
BLOOD IN STOOL: 0
DIARRHEA: 0
BACK PAIN: 0

## 2019-07-25 ASSESSMENT — PAIN SCALES - GENERAL: PAINLEVEL_OUTOF10: 0

## 2019-07-25 NOTE — ED NOTES
Pharmacy Medication History Note      List of current medications patient is taking is complete. Source of information: patient    Changes made to medication list:  Medications flagged for removal (include reason, ex. noncompliance):  N/A    Medications removed (include reason, ex. therapy complete or physician discontinued):  Nasonex- therapy complete    Medications added/doses adjusted:  N/A    Other notes (ex. Recent course of antibiotics, Coumadin dosing):  Denies use of other OTC or herbal medications. Last dose times updated. Monica Arguello [facility]    No current facility-administered medications on file prior to encounter.         Current Outpatient Medications on File Prior to Encounter   Medication Sig Dispense Refill    pravastatin (PRAVACHOL) 80 MG tablet Take 1 tablet by mouth every evening 90 tablet 3    losartan (COZAAR) 100 MG tablet Take 1 tablet by mouth daily 90 tablet 3    ezetimibe (ZETIA) 10 MG tablet Take 1 tablet by mouth daily 90 tablet 3    amLODIPine (NORVASC) 5 MG tablet Take 1 tablet by mouth daily 90 tablet 3    solifenacin (VESICARE) 10 MG tablet Take 5 mg by mouth daily      omeprazole (PRILOSEC) 40 MG delayed release capsule Take 1 capsule by mouth daily 90 capsule 3    tamsulosin (FLOMAX) 0.4 MG capsule Take 1 capsule by mouth daily 90 capsule 3    folic acid (FOLVITE) 392 MCG tablet Take 400 mcg by mouth daily      aspirin 81 MG tablet Take 81 mg by mouth daily      Cholecalciferol (VITAMIN D) 2000 UNITS CAPS capsule Take 1 capsule by mouth daily       Glucosamine HCl-MSM (GLUCOSAMINE-MSM PO) Take 1 tablet by mouth 2 times daily       [DISCONTINUED] mometasone (NASONEX) 50 MCG/ACT nasal spray 2 sprays by Nasal route daily 3 Inhaler 4

## 2019-07-25 NOTE — ED NOTES
Pt back from Ct. Back in bed and back on monitor. EKg complete. Xray done. Denies pain of any kind. Wife at bedside. Patient resting comfortably with no signs of distress. Denies any needs at this time. Bed locked and in lowest position with both side rails raised. Call light within reach.      Nieves Gutierrez RN  07/25/19 [id number]

## 2019-07-25 NOTE — ED NOTES
Pt medicated per orders. Updated on plan of care. Patient resting comfortably with no signs of distress. Denies any needs at this time. Bed locked and in lowest position with both side rails raised. Call light within reach.  Wife at bedside     Adrián Tyler RN  07/25/19 [id number]

## 2019-07-25 NOTE — ED PROVIDER NOTES
States he was trying to walk and was actually leaning up against the wall on his right side because he was having difficulty bearing weight on his right side. This persisted today. He was able to use a cane to walk out of the car when his wife drove him to the emergency department today. States he is feeling like his strength and general fatigue is getting better now. Denies headache, visual changes, speech difficulty, chest pain, shortness breath, abdominal pain, urinary or bowel symptoms or any other symptoms. Has history of hypertension, hyperlipidemia. Denies any previous history of CVA. Nursing Notes were all reviewed and agreed with or any disagreements were addressed  in the HPI. REVIEW OF SYSTEMS    (2-9 systems for level 4, 10 or more for level 5)     Review of Systems   Constitutional: Negative for chills and fever. Eyes: Negative for visual disturbance. Respiratory: Negative for shortness of breath. Cardiovascular: Negative for chest pain. Gastrointestinal: Negative for abdominal pain, blood in stool, diarrhea, nausea and vomiting. Genitourinary: Negative for dysuria and hematuria. Musculoskeletal: Negative for back pain. Skin: Negative for rash. Neurological: Positive for weakness. Negative for numbness and headaches. Psychiatric/Behavioral: Negative for confusion. Positives and Pertinent negatives as per HPI. Except as noted abovein the ROS, all other systems were reviewed and negative.        PAST MEDICAL HISTORY     Past Medical History:   Diagnosis Date    BPH     Colon polyps     DDD (degenerative disc disease), lumbar     Diverticulosis     Gallstone pancreatitis 09/05/2017    GERD (gastroesophageal reflux disease)     Hyperlipidemia     Hypertension     Impaired fasting blood sugar     Kidney stones 11/10    Lumbar spondylosis     OAB (overactive bladder)     Osteoarthritis     Osteoarthritis of right knee     Pneumonia     pneumonia    RBBB 12/19/2016    Renal cyst     Retinal vasculitis     Retinal vasculitis     Right ureteral stone     Type 2 diabetes mellitus without complication, without long-term current use of insulin ([facility])     Type 2 diabetes mellitus without complication, without long-term current use of insulin ([address]          SURGICAL HISTORY     Past Surgical History:   Procedure Laterality Date    APPENDECTOMY      BONE RESECTION Left     bone spur removal left elbow    CARDIAC CATHETERIZATION  3/23/2012    CATARACT REMOVAL WITH IMPLANT Left 11/01/2017    CATARACT REMOVAL WITH IMPLANT Right 10/2017    CHOLECYSTECTOMY, LAPAROSCOPIC  09/07/2017    COLONOSCOPY  7/2008    multiple    CYSTOSCOPY  12/2/2014    Retrograde Pyelogram, stent, Dr. Rita Llamas N/A 12/13/2018    CYSTOSCOPY, LEFT RETROGRADE PYELOGRAM, LEFT STENT PLACEMENT performed by Kash Stahl MD at [address] [place] [address] Left 1/2/2019    CYSTOSCOPY LEFT URETEROSCOPY HOLMIUM LASER LITHOTRIPSY, STONE MANIPULATION WITH LEFT STENT EXCHANGE performed by Kash Stahl MD at [address] [address]  3/28/2012    laparoscopic    TONSILLECTOMY           CURRENTMEDICATIONS       Previous Medications    AMLODIPINE (NORVASC) 5 MG TABLET    Take 1 tablet by mouth daily    ASPIRIN 81 MG TABLET    Take 81 mg by mouth daily    CHOLECALCIFEROL (VITAMIN D) 2000 UNITS CAPS CAPSULE    Take 1 capsule by mouth daily     EZETIMIBE (ZETIA) 10 MG TABLET    Take 1 tablet by mouth daily    FOLIC ACID (FOLVITE) 729 MCG TABLET    Take 400 mcg by mouth daily    GLUCOSAMINE HCL-MSM (GLUCOSAMINE-MSM PO)    Take 1 tablet by mouth 2 times daily     LOSARTAN (COZAAR) 100 MG TABLET    Take 1 tablet by mouth daily    OMEPRAZOLE (PRILOSEC) 40 MG DELAYED RELEASE CAPSULE    Take 1 capsule by mouth daily    PRAVASTATIN (PRAVACHOL) 80 MG TABLET    Take 1 tablet by mouth every evening    SOLIFENACIN (VESICARE) 10 MG TABLET    Take 5 mg by mouth daily    TAMSULOSIN ataxia noted with finger-to-nose testing bilaterally or heel-to-shin bilaterally. No sensorimotor deficits appreciated. No aphasia or dysarthria noted. Skin: Skin is warm and dry. No rash noted. He is not diaphoretic. No erythema. Psychiatric: He has a normal mood and affect. His behavior is normal.   Nursing note and vitals reviewed.       DIAGNOSTIC RESULTS   LABS:    Labs Reviewed   COMPREHENSIVE METABOLIC PANEL W/ REFLEX TO MG FOR LOW K - Abnormal; Notable for the following components:       Result Value    Sodium 146 (*)     Glucose 114 (*)     All other components within normal limits    Narrative:     Performed at:  [facility] [address], [employer]   Phone [phone]   POCT GLUCOSE - Abnormal; Notable for the following components:    POC Glucose 105 (*)     All other components within normal limits    Narrative:     Performed at:  [facility] [address], [employer]   Phone [phone]   CBC WITH AUTO DIFFERENTIAL    Narrative:     Performed at:  [facility] [address], [employer]   Phone [phone]   TROPONIN    Narrative:     Performed at:  [facility] [address], [employer]   Phone [phone]   PROTIME-INR    Narrative:     Performed at:  [facility] [address], [address] [employer]   Phone [phone]   ETHANOL    Narrative:     Performed at:  [facility] [address], [employer]   Phone [phone]   APTT    Narrative:     Performed at:  [facility] [address], [employer]   Phone [phone]   URINE RT REFLEX TO CULTURE    Narrative:     Performed at:  [facility] [email or web address], [employer]   Phone [phone] process. No significant change compared to prior. No acute process. Cta Neck W Contrast    Result Date: 7/25/2019  EXAMINATION: CTA OF THE NECK; CTA OF THE HEAD WITH CONTRAST 7/25/2019 10:36 am: TECHNIQUE: CTA of the neck was performed with the administration of intravenous contrast. Multiplanar reformatted images are provided for review. MIP images are provided for review. Stenosis of the internal carotid arteries measured using NASCET criteria. Dose modulation, iterative reconstruction, and/or weight based adjustment of the mA/kV was utilized to reduce the radiation dose to as low as reasonably achievable.; CTA of the head/brain was performed with the administration of intravenous contrast. Multiplanar reformatted images are provided for review. MIP images are provided for review. Dose modulation, iterative reconstruction, and/or weight based adjustment of the mA/kV was utilized to reduce the radiation dose to as low as reasonably achievable. COMPARISON: None HISTORY: ORDERING SYSTEM PROVIDED HISTORY: STROKE TECHNOLOGIST PROVIDED HISTORY: Has a \"code stroke\" or \"stroke alert\" been called? ->Yes FINDINGS: CTA NECK: AORTIC ARCH/ARCH VESSELS: No significant stenosis is seen of the innominate artery or subclavian arteries. CAROTID ARTERIES: The common carotid arteries are normal in appearance without evidence of a flow limiting stenosis. Mild atherosclerotic disease involving the bilateral carotid bifurcation. No flow-limiting stenosis. No dissection or arterial injury is seen. VERTEBRAL ARTERIES: The vertebral arteries both arise from the subclavian arteries and are normal in caliber without evidence of flow limiting stenosis or dissection. SOFT TISSUES: The lung apices are clear. No evidence of significant superior mediastinal lymphadenopathy. The nasopharynx, oral cavity, oropharynx, hypopharynx, and laryngeal structures are unremarkable. The parotid glands and submandibular glands are unremarkable. Procedures    CRITICAL CARE TIME   N/A    CONSULTS:  1. [facility] stroke team  2. Dr. Luke Nielson and DIFFERENTIAL DIAGNOSIS/MDM:   Vitals:    Vitals:    07/25/19 1032 07/25/19 1115 07/25/19 1130 07/25/19 1145   BP: 135/67 115/76 131/88 131/81   Pulse: 59 68 68 68   Resp: 18 19 17 16   Temp:       TempSrc:       SpO2: 100% 99%     Weight: 225 lb (102.1 kg)      Height: 5' 7\" (1.702 m)          Patient was given thefollowing medications:  Medications   iopamidol (ISOVUE-370) 76 % injection 75 mL (75 mLs Intravenous Given 7/25/19 1053)   0.9 % sodium chloride bolus (500 mLs Intravenous New Bag 7/25/19 1111)   aspirin chewable tablet 324 mg (324 mg Oral Given 7/25/19 1149)       Patient presented with some general weakness but worse right-sided weakness that was actually causing him difficulty to ambulate that started last night and lasted for about 12 hours. He states he is feeling much better at the time he gets here. Did discuss this initially with stroke team and initially story was that he had some general weakness with a cough last night but upon further discussion he is stating that he had some worse right-sided weakness that is now improved. No obvious neurologic deficit noted on exam.  CTs are unremarkable. Laboratory testing unremarkable. Alcohol level is slightly elevated at 19 but he does admit to drinking a mixed alcoholic drink last night with whiskey. Would not think that his alcohol level would have caused him to have continual right-sided weakness this morning. Concern is for possible TIA given that his symptoms have improved. He was given aspirin here after swallow study was performed by nursing staff. Do not suspect mass lesion, subarachnoid hemorrhage, meningitis, pneumonia, pulmonary embolus or other emergent etiology. Patient was stable time of admission. FINAL IMPRESSION      1.  Acute focal neurological deficit, onset within 3-24 hours    2. Right sided weakness          DISPOSITION/PLAN   DISPOSITION Decision To Admit 07/25/2019 11:44:13 AM      PATIENT REFERREDTO:  No follow-up provider specified.     DISCHARGE MEDICATIONS:  New Prescriptions    No medications on file       DISCONTINUED MEDICATIONS:  Discontinued Medications    MOMETASONE (NASONEX) 50 MCG/ACT NASAL SPRAY    2 sprays by Nasal route daily              (Please note that portions ofthis note were completed with a voice recognition program.  Efforts were made to edit the dictations but occasionally words are mis-transcribed.)    Abril Pemberton PA-C (electronically signed)           Abril Pemberton PA-C  07/25/19 5413

## 2019-07-25 NOTE — CARE COORDINATION
Discharge Planning Assessment  SW discharge planner met with patient to discuss reason for admission, current living situation, and potential needs at the time of discharge. Pt in ED d/t acute focal neurological deficit and right sided weakness    Demographics/Insurance verified:  Yes    Current type of dwelling:  House    Living arrangements:  w/spouse    Level of function/Support:  Pt reports he had to use a cane today d/t weakness but normally independent. Spouse is supportive. PCP:  Dr. Jimmy Bartholomew    Last Visit to PCP:  May    DME:  [employer] - does not use normally    Active with any community resources/agencies/skilled home care:  None. No non-skilled needs reported. Medication compliance issues:  No    Financial issues that could impact healthcare:  No    Transportation at the time of discharge:  Spouse will assist home. Tentative discharge plan:  Home most likely. No needs identified at this time.     Electronically signed by PRADEEP Angelo, MATW on 7/25/2019 at 2:40 PM

## 2019-07-25 NOTE — ED NOTES
Pt alert and oriented, Pt to Er with generalized weakness that began last night around 2000, states mowed the lawn last night, weakness began a few hours later. Pt states was having weakness in his right arm and leg, was having difficulty walking down the hallway/ pt states weakness feels better but 'I just don't feel like myself. \" perrla intact. Sensation intact, pt able to move all extremities with no difficulty. Pt denies pain of any kind. Pt denies any other problems or needs at this time.      Ryan King RN  07/25/19 2122

## 2019-07-26 ENCOUNTER — APPOINTMENT (OUTPATIENT)
Dept: MRI IMAGING | Age: 74
End: 2019-07-26
Payer: MEDICARE

## 2019-07-26 VITALS
DIASTOLIC BLOOD PRESSURE: 85 MMHG | HEIGHT: 68 IN | WEIGHT: 250.2 LBS | RESPIRATION RATE: 16 BRPM | SYSTOLIC BLOOD PRESSURE: 139 MMHG | OXYGEN SATURATION: 93 % | HEART RATE: 47 BPM | TEMPERATURE: 98.1 F | BODY MASS INDEX: 37.92 KG/M2

## 2019-07-26 LAB
LV EF: 55 %
LVEF MODALITY: NORMAL

## 2019-07-26 PROCEDURE — 92526 ORAL FUNCTION THERAPY: CPT

## 2019-07-26 PROCEDURE — 92610 EVALUATE SWALLOWING FUNCTION: CPT

## 2019-07-26 PROCEDURE — 97530 THERAPEUTIC ACTIVITIES: CPT

## 2019-07-26 PROCEDURE — 6370000000 HC RX 637 (ALT 250 FOR IP): Performed by: INTERNAL MEDICINE

## 2019-07-26 PROCEDURE — 99220 PR INITIAL OBSERVATION CARE/DAY 70 MINUTES: CPT | Performed by: PSYCHIATRY & NEUROLOGY

## 2019-07-26 PROCEDURE — 93306 TTE W/DOPPLER COMPLETE: CPT

## 2019-07-26 PROCEDURE — 6370000000 HC RX 637 (ALT 250 FOR IP): Performed by: PSYCHIATRY & NEUROLOGY

## 2019-07-26 PROCEDURE — 93880 EXTRACRANIAL BILAT STUDY: CPT

## 2019-07-26 PROCEDURE — G0378 HOSPITAL OBSERVATION PER HR: HCPCS

## 2019-07-26 PROCEDURE — 70551 MRI BRAIN STEM W/O DYE: CPT

## 2019-07-26 PROCEDURE — 6360000002 HC RX W HCPCS: Performed by: INTERNAL MEDICINE

## 2019-07-26 PROCEDURE — 97161 PT EVAL LOW COMPLEX 20 MIN: CPT

## 2019-07-26 PROCEDURE — 96372 THER/PROPH/DIAG INJ SC/IM: CPT

## 2019-07-26 RX ORDER — ASPIRIN 81 MG/1
81 TABLET ORAL DAILY
Qty: 30 TABLET | Refills: 3 | Status: SHIPPED | OUTPATIENT
Start: 2019-07-26 | End: 2019-07-26 | Stop reason: HOSPADM

## 2019-07-26 RX ORDER — CLOPIDOGREL BISULFATE 75 MG/1
75 TABLET ORAL DAILY
Qty: 90 TABLET | Refills: 3 | Status: SHIPPED | OUTPATIENT
Start: 2019-07-27 | End: 2020-03-13 | Stop reason: SDUPTHER

## 2019-07-26 RX ORDER — CLOPIDOGREL BISULFATE 75 MG/1
75 TABLET ORAL DAILY
Status: DISCONTINUED | OUTPATIENT
Start: 2019-07-26 | End: 2019-07-26 | Stop reason: HOSPADM

## 2019-07-26 RX ADMIN — TAMSULOSIN HYDROCHLORIDE 0.4 MG: 0.4 CAPSULE ORAL at 14:24

## 2019-07-26 RX ADMIN — AMLODIPINE BESYLATE 5 MG: 5 TABLET ORAL at 14:23

## 2019-07-26 RX ADMIN — FOLIC ACID 500 MCG: 1 TABLET ORAL at 10:05

## 2019-07-26 RX ADMIN — CLOPIDOGREL 75 MG: 75 TABLET, FILM COATED ORAL at 14:23

## 2019-07-26 RX ADMIN — ASPIRIN 81 MG: 81 TABLET ORAL at 14:23

## 2019-07-26 RX ADMIN — LOSARTAN POTASSIUM 100 MG: 100 TABLET, FILM COATED ORAL at 14:23

## 2019-07-26 RX ADMIN — VITAMIN D, TAB 1000IU (100/BT) 2000 UNITS: 25 TAB at 10:05

## 2019-07-26 RX ADMIN — TROSPIUM CHLORIDE 20 MG: 20 TABLET, FILM COATED ORAL at 10:05

## 2019-07-26 RX ADMIN — ENOXAPARIN SODIUM 40 MG: 40 INJECTION SUBCUTANEOUS at 10:04

## 2019-07-26 RX ADMIN — PANTOPRAZOLE SODIUM 40 MG: 40 TABLET, DELAYED RELEASE ORAL at 10:05

## 2019-07-26 RX ADMIN — TROSPIUM CHLORIDE 20 MG: 20 TABLET, FILM COATED ORAL at 17:19

## 2019-07-26 ASSESSMENT — PAIN SCALES - GENERAL
PAINLEVEL_OUTOF10: 0

## 2019-07-26 NOTE — H&P
his parents are  because of natural  causes. Mother had some kind of mental illness and she also had  Alzheimer's disease. Father  of leukemia. There are six siblings  alive; one sibling has diabetes, a sister has pyloric stenosis. REVIEW OF SYSTEMS:  Negative for loss of consciousness. No visual  blurring. No seizure activity. No speech disturbance. No swallowing  disturbance. No angina pectoris. No orthopnea. No paroxysmal nocturnal  dyspnea. Denies any abdominal pain. No hematemesis or melena. No  genitourinary complaints. No hematuria. No intermittent claudication. Does have chronic musculoskeletal pain. PHYSICAL EXAMINATION:  GENERAL:  He is alert, awake, and oriented x3. A 61-year-old man,  looking consistent with his stated age. VITAL SIGNS:  His temperature is 96.3, blood pressure is 131/80,  respirations are 18, heart rate 48. HEENT:  Oral mucosa dry. SKIN:  Warm and dry. NECK:  Supple. Faint carotid bruits. No jugular venous distention. No  lymphadenopathy. LUNGS:  Vesicular breath sounds, poor inspiration. No crackles or  wheezing. HEART:  Irregular rate and rhythm. S1 and S2. A 1/6 systolic ejection  murmur. No gallop rhythm. ABDOMEN:  Soft, nontender. Bowel sounds present. EXTREMITIES:  Show trace edema. NEUROLOGIC:  The patient has no acute focal sensory motor deficit. Babinski is bilaterally absent. LABORATORY EVALUATION:  Shows sodium of 146, potassium 4.4, chloride  110, CO2 is 24, BUN 12, creatinine 1.1. Anion gap is 12. Blood sugar  is 114. Calcium is 9.6. Total cholesterol is 140, HDL 48, LDL 65. Troponin less than 0.01. Albumin 3.9, globulin 3.5. AST and ALT are 22  and 23.  Ethanol level is 19, white blood cell count is 6.2. Hemoglobin  and hematocrit is 15.2 and 45.2, platelet count is 860. PT/INR is 11.4  and 1.00. Urinalysis is negative for acute UTI. RADIOLOGY DATA:  CTA of the neck and CT of the head already done. No  acute intracranial abnormalities, chronic small vessel ischemia,  paranasal sinus disease. CT of the neck shows no high-grade stenosis or  focal occlusion involving the cervical vasculature. No evidence of  aneurysm. ASSESSMENT:  TIA, right-sided hemiparesis, hypertension, osteoarthritis,  GERD, diverticulosis, BPH. PLAN:  Get him admitted, put him on neuro checks. Carotid Doppler, 2-D  echo, PT/OT and speech evaluation.         Estella Monroy MD    D: 07/25/2019 23:11:59       T: 07/26/2019 0:39:33     [email or web address]  Job#: [id number]     Doc#: [id number]    CC:

## 2019-07-26 NOTE — CONSULTS
NEUROLOGY CONSULTATION     Patient's Name :   Alex Diaz        YOB: 1945                    Date of Consultation : 7/26/2019 9:32 AM    Referring Physician :  Conchita Batres MD    Reason for Consultation :  Transient ischemic attack    HISTORY OF PRESENT ILLNESS      Chen Rodrigues is a 74 y.o. male   History was obtained from patient and from dictations in the chart. Additional history was obtained from the patient's wife at the bedside. Patient was at home and was having some flailing movements in his arms and legs. He felt dizzy. He wanted to get up and walk around and when he got up he found that he had right-sided weakness. The leg was more involved in the arm. He stumbled to the bathroom came back and the symptoms were not getting better. He was brought to the hospital.  Symptoms resolved after about 4 to 5 hours. Onset of symptoms was acute and abrupt and moderately severe without any aggravating or relieving factors. Patient has never had any similar symptoms in the past.  Speech was not involved. There was no facial droop. Patient had been taking aspirin at home and following with his primary care physician regularly. REVIEW OF SYSTEMS     Denies any chest pain palpitations breathlessness abdominal pain fever or weight loss. Rest of the 14 system review was reviewed and was negative except for the symptoms noted above.     Past Medical History:   Diagnosis Date    BPH     Colon polyps     DDD (degenerative disc disease), lumbar     Diverticulosis     Gallstone pancreatitis 09/05/2017    GERD (gastroesophageal reflux disease)     Hyperlipidemia     Hypertension     Impaired fasting blood sugar     Kidney stones 11/10    Lumbar spondylosis     OAB (overactive bladder)     Osteoarthritis     Osteoarthritis of right knee     Pneumonia     pneumonia    RBBB 12/19/2016    Renal cyst     Retinal vasculitis     Retinal vasculitis     Right ureteral stone     Type 2 diabetes mellitus without complication, without long-term current use of insulin (HCC)     Type 2 diabetes mellitus without complication, without long-term current use of insulin ([address]      Family History   Problem Relation Age of Onset    Mental Illness Mother         Alzheimers    Cancer Father         Leukemia    Diabetes Brother     Other Sister         pyloric valve repacement     Social History     Socioeconomic History    Marital status:      Spouse name: Lorrie Lama    Number of children: 3    Years of education: 12    Highest education level: None   Occupational History    None   Social Needs    Financial resource strain: None    Food insecurity:     Worry: None     Inability: None    Transportation needs:     Medical: None     Non-medical: None   Tobacco Use    Smoking status: Former Smoker     Years: 16.00     Last attempt to quit: 1977     Years since quittin.2    Smokeless tobacco: Never Used    Tobacco comment:    Substance and Sexual Activity    Alcohol use:  Yes     Alcohol/week: 1.7 standard drinks     Types: 2 Standard drinks or equivalent per week     Comment: ONCE A WEEK    Drug use: Never    Sexual activity: Yes     Partners: Female   Lifestyle    Physical activity:     Days per week: None     Minutes per session: None    Stress: None   Relationships    Social connections:     Talks on phone: None     Gets together: None     Attends [employer] service: None     Active member of club or organization: None     Attends meetings of clubs or organizations: None     Relationship status: None    Intimate partner violence:     Fear of current or ex partner: None     Emotionally abused: None     Physically abused: None     Forced sexual activity: None   Other Topics Concern    None   Social History Narrative    None     Current Facility-Administered Medications   Medication Dose Route Frequency Provider Last Rate Last Dose    amLODIPine (NORVASC) tablet 5 mg  5 mg Oral Daily Peterson Christensen MD        aspirin EC tablet 81 mg  81 mg Oral Daily Peterson Christensen MD   81 mg at 07/25/19 2252    vitamin D (CHOLECALCIFEROL) tablet 2,000 Units  2,000 Units Oral Daily Peterson Christensen MD        folic acid (FOLVITE) tablet 500 mcg  500 mcg Oral Daily Peterson Christensen MD        losartan (COZAAR) tablet 100 mg  100 mg Oral Daily Peterson Christensen MD        pantoprazole (PROTONIX) tablet 40 mg  40 mg Oral QAM AC Peterson Christensen MD        pravastatin (PRAVACHOL) tablet 80 mg  80 mg Oral QPM Peterson Christensen MD   80 mg at 07/25/19 2252    trospium (SANCTURA) tablet 20 mg  20 mg Oral BID AC Peterson Christensen MD   20 mg at 07/25/19 1719    tamsulosin (FLOMAX) capsule 0.4 mg  0.4 mg Oral Daily Peterson Christensen MD   0.4 mg at 07/25/19 2252    enoxaparin (LOVENOX) injection 40 mg  40 mg Subcutaneous Daily Peterson Christensen MD         No Known Allergies    PHYSICAL EXAMINATION:  /87   Pulse 51   Temp 97.5 °F (36.4 °C) (Temporal)   Resp 16   Ht 5' 8\" (1.727 m)   Wt 250 lb 3.2 oz (113.5 kg)   SpO2 95%   BMI 38.04 kg/m²   Appearance: Well appearing, well nourished and in no distress  Mental Status Exam: Patient is alert, oriented to person, place and time. Recent and remote memory is normal  Fund of Knowledge is normal  Attention/concentration is normal.   Speech : No dysarthria  Language : No aphasia  Funduscopic Exam: sharp disc margins  Cranial Nerves:   II: Visual fields:  Full to confrontation  III: Pupils:  equal, round, reactive to light  III,IV,VI: Extra Ocular Movements are intact.  No nystagmus  V: Facial sensation is intact to pin prick and light touch  VII: Facial strength and movements: intact and symmetric smile,cheek puffing and eyebrow elevation  VIII: Hearing:  Intact to finger rub bilaterally  IX: Palate  elevation is symmetric  XI: Shoulder shrug is intact  XII: Tongue movements are

## 2019-07-26 NOTE — PROGRESS NOTES
Disposition is home (no HHC/DME needs), transported with wife per car, taken to lobby via w/c w/ beolongings, no complications. Anup Alejo RN, BSN, PCCN.

## 2019-07-30 ENCOUNTER — TELEPHONE (OUTPATIENT)
Dept: INTERNAL MEDICINE CLINIC | Age: 74
End: 2019-07-30

## 2019-08-02 ENCOUNTER — FOLLOWUP TELEPHONE ENCOUNTER (OUTPATIENT)
Dept: INPATIENT UNIT | Age: 74
End: 2019-08-02

## 2019-08-02 NOTE — TELEPHONE ENCOUNTER
Discharge call. Patient has some concerns regarding antiplatelet medication. Reviewed to be aware of signs of bleeding: anahi blood in urine or stool, dark or black stool, nosebleed, and notify MD of these changes. Advised if he were to fall and hit his head to notify Dr Sheldon Hawkins or go to Emergency Room due to risk of head trauma causing bleeding in the brain. Pt states he usually has 2-3 BM's per day and has sometimes noticed a small amount of blood on the paper after he wipes. He is unaware of any hx of hemorrhoids. Advised to call Dr Sada Juan office Monday morning for advise regarding same. He has an appointment with Dr Sheldon Hawkins next week.

## 2019-08-20 ENCOUNTER — OFFICE VISIT (OUTPATIENT)
Dept: INTERNAL MEDICINE CLINIC | Age: 74
End: 2019-08-20
Payer: MEDICARE

## 2019-08-20 VITALS
WEIGHT: 245 LBS | BODY MASS INDEX: 37.25 KG/M2 | HEART RATE: 66 BPM | SYSTOLIC BLOOD PRESSURE: 120 MMHG | RESPIRATION RATE: 12 BRPM | DIASTOLIC BLOOD PRESSURE: 74 MMHG

## 2019-08-20 DIAGNOSIS — E11.9 TYPE 2 DIABETES MELLITUS WITHOUT COMPLICATION, WITHOUT LONG-TERM CURRENT USE OF INSULIN (HCC): ICD-10-CM

## 2019-08-20 DIAGNOSIS — I49.3 FREQUENT PVCS: ICD-10-CM

## 2019-08-20 DIAGNOSIS — G45.9 TIA (TRANSIENT ISCHEMIC ATTACK): Primary | ICD-10-CM

## 2019-08-20 DIAGNOSIS — I49.9 IRREGULAR HEART RATE: ICD-10-CM

## 2019-08-20 DIAGNOSIS — Z12.5 SCREENING FOR PROSTATE CANCER: ICD-10-CM

## 2019-08-20 PROBLEM — G81.91 RIGHT HEMIPARESIS (HCC): Status: RESOLVED | Noted: 2019-07-25 | Resolved: 2019-08-20

## 2019-08-20 PROCEDURE — 1036F TOBACCO NON-USER: CPT | Performed by: INTERNAL MEDICINE

## 2019-08-20 PROCEDURE — 4040F PNEUMOC VAC/ADMIN/RCVD: CPT | Performed by: INTERNAL MEDICINE

## 2019-08-20 PROCEDURE — 99214 OFFICE O/P EST MOD 30 MIN: CPT | Performed by: INTERNAL MEDICINE

## 2019-08-20 PROCEDURE — 1111F DSCHRG MED/CURRENT MED MERGE: CPT | Performed by: INTERNAL MEDICINE

## 2019-08-20 PROCEDURE — G8417 CALC BMI ABV UP PARAM F/U: HCPCS | Performed by: INTERNAL MEDICINE

## 2019-08-20 PROCEDURE — 2022F DILAT RTA XM EVC RTNOPTHY: CPT | Performed by: INTERNAL MEDICINE

## 2019-08-20 PROCEDURE — 3045F PR MOST RECENT HEMOGLOBIN A1C LEVEL 7.0-9.0%: CPT | Performed by: INTERNAL MEDICINE

## 2019-08-20 PROCEDURE — 3017F COLORECTAL CA SCREEN DOC REV: CPT | Performed by: INTERNAL MEDICINE

## 2019-08-20 PROCEDURE — G8427 DOCREV CUR MEDS BY ELIG CLIN: HCPCS | Performed by: INTERNAL MEDICINE

## 2019-08-20 PROCEDURE — 1123F ACP DISCUSS/DSCN MKR DOCD: CPT | Performed by: INTERNAL MEDICINE

## 2019-08-20 PROCEDURE — 93000 ELECTROCARDIOGRAM COMPLETE: CPT | Performed by: INTERNAL MEDICINE

## 2019-08-20 NOTE — PATIENT INSTRUCTIONS
[email or web address] and sign in to your [employer] account. Enter R583 in the [place] box to learn more about \"Premature Heartbeat: Care Instructions. \"     If you do not have an account, please click on the \"Sign Up Now\" link. Current as of: July 22, 2018  Content Version: 12.1  © 6127-6640 [employer]. Care instructions adapted under license by [facility] ([facility]). If you have questions about a medical condition or this instruction, always ask your healthcare professional. [place] [address] any warranty or liability for your use of this information. Patient Education        Transient Ischemic Attack: Care Instructions  Your Care Instructions    A transient ischemic attack (TIA) is when blood flow to a part of your brain is blocked for a short time. A TIA is like a stroke but usually lasts only a few minutes. A TIA does not cause lasting brain damage. Any vision problems, slurred speech, or other symptoms usually go away in 10 to 20 minutes. But they may last for up to 24 hours. TIAs are often warning signs of a stroke. Some people who have a TIA may have a stroke in the future. A stroke can cause symptoms like those of a TIA. But a stroke causes lasting damage to your brain. You can take steps to help prevent a stroke. One thing you can do is get early treatment. If you have other new symptoms, or if your symptoms do not get better, go back to the emergency room or call your doctor right away. Getting treatment right away may prevent long-term brain damage caused by a stroke. The doctor has checked you carefully, but problems can develop later. If you notice any problems or new symptoms, get medical treatment right away. Follow-up care is a key part of your treatment and safety. Be sure to make and go to all appointments, and call your doctor if you are having problems.  It's also a good idea to know your test results and keep a list of the statements. ? Sudden problems with walking or balance. ? A sudden, severe headache that is different from past headaches. Call 911 even if these symptoms go away in a few minutes.     · You feel like you are having another TIA.    Watch closely for changes in your health, and be sure to contact your doctor if you have any problems. Where can you learn more? Go to [email or web address] and sign in to your [employer] account. Enter [phone] in the [place] box to learn more about \"Transient Ischemic Attack: Care Instructions. \"     If you do not have an account, please click on the \"Sign Up Now\" link. Current as of: September 26, 2018  Content Version: 12.1  © 1871-9329 [employer]. Care instructions adapted under license by [employer]. If you have questions about a medical condition or this instruction, always ask your healthcare professional. [address] any warranty or liability for your use of this information.

## 2019-08-20 NOTE — PROGRESS NOTES
[facility]) Physicians  Internal Medicine  Patient Encounter  Meliza Montemayor D.O., [place]       Post-Discharge Transitional Care Management Services or Hospital Follow Up      Cydney Bower   YOB: 1945    Date of Office Visit:  8/20/2019  Date of Hospital Admission: 7/25/19  Date of Hospital Discharge: 7/26/19  Risk of hospital readmission (high >=14%.  Medium >=10%) :Readmission Risk Score: 9      Care management risk score Rising risk (score 2-5) and Complex Care (Scores >=6): 1     Non face to face  following discharge, date last encounter closed (first attempt may have been earlier): *No documented post hospital discharge outreach found in the last 14 days    Call initiated 2 business days of discharge: *No response recorded in the last 14 days    Patient Active Problem List   Diagnosis    Arthritis    GERD (gastroesophageal reflux disease)    Diverticulosis    Osteoarthritis    Benign prostatic hyperplasia    Kidney stones    Anemia    Polyp, colonic    AUDIE (obstructive sleep apnea)    Benign essential HTN    Metabolic syndrome    Dyslipidemia    Bilateral leg edema    RBBB    Insomnia    Type 2 diabetes mellitus without complication, without long-term current use of insulin ([address]    TIA (transient ischemic attack)       No Known Allergies    Medications listed as ordered at the time of discharge from hospital   Felipa PINTO   Home Medication Instructions ALONDRA:    Printed on:08/20/19 1500   Medication Information                      amLODIPine (NORVASC) 5 MG tablet  Take 1 tablet by mouth daily             aspirin 81 MG tablet  Take 81 mg by mouth daily             Cholecalciferol (VITAMIN D) 2000 UNITS CAPS capsule  Take 1 capsule by mouth daily              clopidogrel (PLAVIX) 75 MG tablet  Take 1 tablet by mouth daily             ezetimibe (ZETIA) 10 MG tablet  Take 1 tablet by mouth daily             folic acid (FOLVITE) 411 MCG tablet  Take 400 mcg by mouth daily

## 2019-08-22 ENCOUNTER — NURSE ONLY (OUTPATIENT)
Dept: CARDIOLOGY CLINIC | Age: 74
End: 2019-08-22
Payer: MEDICARE

## 2019-08-22 DIAGNOSIS — I49.9 VENTRICULAR ARRHYTHMIA: ICD-10-CM

## 2019-08-22 DIAGNOSIS — G45.9 TIA (TRANSIENT ISCHEMIC ATTACK): ICD-10-CM

## 2019-08-22 DIAGNOSIS — I49.3 VENTRICULAR PREMATURE BEATS: ICD-10-CM

## 2019-08-22 DIAGNOSIS — G45.9 INTERMITTENT CEREBRAL ISCHEMIA: Primary | ICD-10-CM

## 2019-08-22 DIAGNOSIS — I49.9 IRREGULAR HEART RATE: ICD-10-CM

## 2019-08-22 DIAGNOSIS — I49.3 FREQUENT PVCS: ICD-10-CM

## 2019-08-26 ENCOUNTER — HOSPITAL ENCOUNTER (OUTPATIENT)
Age: 74
Discharge: HOME | End: 2019-08-26
Payer: MEDICARE

## 2019-08-26 DIAGNOSIS — Z12.5 SCREENING FOR PROSTATE CANCER: ICD-10-CM

## 2019-08-26 DIAGNOSIS — G45.9 TIA (TRANSIENT ISCHEMIC ATTACK): ICD-10-CM

## 2019-08-26 DIAGNOSIS — E11.9 TYPE 2 DIABETES MELLITUS WITHOUT COMPLICATION, WITHOUT LONG-TERM CURRENT USE OF INSULIN (HCC): ICD-10-CM

## 2019-08-26 DIAGNOSIS — I49.9 IRREGULAR HEART RATE: ICD-10-CM

## 2019-08-26 LAB
A/G RATIO: 1.4 (ref 1.1–2.2)
ALBUMIN SERPL-MCNC: 4.3 G/DL (ref 3.4–5)
ALP BLD-CCNC: 91 U/L (ref 40–129)
ALT SERPL-CCNC: 29 U/L (ref 10–40)
ANION GAP SERPL CALCULATED.3IONS-SCNC: 13 MMOL/L (ref 3–16)
AST SERPL-CCNC: 25 U/L (ref 15–37)
BASOPHILS ABSOLUTE: 0.1 K/UL (ref 0–0.2)
BASOPHILS RELATIVE PERCENT: 0.8 %
BILIRUB SERPL-MCNC: 0.5 MG/DL (ref 0–1)
BUN BLDV-MCNC: 19 MG/DL (ref 7–20)
CALCIUM SERPL-MCNC: 9.9 MG/DL (ref 8.3–10.6)
CHLORIDE BLD-SCNC: 107 MMOL/L (ref 99–110)
CHOLESTEROL, TOTAL: 116 MG/DL (ref 0–199)
CO2: 24 MMOL/L (ref 21–32)
CREAT SERPL-MCNC: 1.4 MG/DL (ref 0.8–1.3)
EOSINOPHILS ABSOLUTE: 0.3 K/UL (ref 0–0.6)
EOSINOPHILS RELATIVE PERCENT: 4.6 %
GFR AFRICAN AMERICAN: 60
GFR NON-AFRICAN AMERICAN: 49
GLOBULIN: 3.1 G/DL
GLUCOSE BLD-MCNC: 148 MG/DL (ref 70–99)
HCT VFR BLD CALC: 45.7 % (ref 40.5–52.5)
HDLC SERPL-MCNC: 37 MG/DL (ref 40–60)
HEMOGLOBIN: 15.6 G/DL (ref 13.5–17.5)
LDL CHOLESTEROL CALCULATED: 48 MG/DL
LYMPHOCYTES ABSOLUTE: 2.5 K/UL (ref 1–5.1)
LYMPHOCYTES RELATIVE PERCENT: 33.4 %
MCH RBC QN AUTO: 32.3 PG (ref 26–34)
MCHC RBC AUTO-ENTMCNC: 34.2 G/DL (ref 31–36)
MCV RBC AUTO: 94.4 FL (ref 80–100)
MONOCYTES ABSOLUTE: 0.5 K/UL (ref 0–1.3)
MONOCYTES RELATIVE PERCENT: 6.6 %
NEUTROPHILS ABSOLUTE: 4 K/UL (ref 1.7–7.7)
NEUTROPHILS RELATIVE PERCENT: 54.6 %
PDW BLD-RTO: 13.6 % (ref 12.4–15.4)
PLATELET # BLD: 180 K/UL (ref 135–450)
PMV BLD AUTO: 11.3 FL (ref 5–10.5)
POTASSIUM SERPL-SCNC: 4.7 MMOL/L (ref 3.5–5.1)
PROSTATE SPECIFIC ANTIGEN: 1.83 NG/ML (ref 0–4)
RBC # BLD: 4.84 M/UL (ref 4.2–5.9)
SODIUM BLD-SCNC: 144 MMOL/L (ref 136–145)
TOTAL PROTEIN: 7.4 G/DL (ref 6.4–8.2)
TRIGL SERPL-MCNC: 157 MG/DL (ref 0–150)
TSH SERPL DL<=0.05 MIU/L-ACNC: 1.36 UIU/ML (ref 0.27–4.2)
VLDLC SERPL CALC-MCNC: 31 MG/DL
WBC # BLD: 7.3 K/UL (ref 4–11)

## 2019-08-26 PROCEDURE — 85025 COMPLETE CBC W/AUTO DIFF WBC: CPT

## 2019-08-26 PROCEDURE — 80053 COMPREHEN METABOLIC PANEL: CPT

## 2019-08-26 PROCEDURE — 36415 COLL VENOUS BLD VENIPUNCTURE: CPT

## 2019-08-26 PROCEDURE — 84153 ASSAY OF PSA TOTAL: CPT

## 2019-08-26 PROCEDURE — 80061 LIPID PANEL: CPT

## 2019-08-26 PROCEDURE — 84443 ASSAY THYROID STIM HORMONE: CPT

## 2019-08-26 PROCEDURE — 83036 HEMOGLOBIN GLYCOSYLATED A1C: CPT

## 2019-08-27 LAB
ESTIMATED AVERAGE GLUCOSE: 137 MG/DL
HBA1C MFR BLD: 6.4 %

## 2019-08-28 ENCOUNTER — TELEPHONE (OUTPATIENT)
Dept: INTERNAL MEDICINE CLINIC | Age: 74
End: 2019-08-28

## 2019-08-28 RX ORDER — OMEPRAZOLE 40 MG/1
40 CAPSULE, DELAYED RELEASE ORAL DAILY
Qty: 90 CAPSULE | Refills: 3 | Status: SHIPPED | OUTPATIENT
Start: 2019-08-28 | End: 2020-08-31 | Stop reason: SDUPTHER

## 2019-08-29 RX ORDER — MOMETASONE FUROATE 50 UG/1
2 SPRAY, METERED NASAL DAILY
Qty: 3 INHALER | Refills: 3 | Status: SHIPPED | OUTPATIENT
Start: 2019-08-29 | End: 2019-12-09 | Stop reason: SDUPTHER

## 2019-09-11 ENCOUNTER — TELEPHONE (OUTPATIENT)
Dept: INTERNAL MEDICINE CLINIC | Age: 74
End: 2019-09-11

## 2019-09-11 DIAGNOSIS — R06.83 SNORING: Primary | ICD-10-CM

## 2019-09-11 DIAGNOSIS — R53.83 TIREDNESS: ICD-10-CM

## 2019-09-11 DIAGNOSIS — R06.81 APNEA: ICD-10-CM

## 2019-09-11 NOTE — TELEPHONE ENCOUNTER
Patient saw a Urologist yesterday who agreed with Dr. Lito Celeste findings from his appointment a few weeks ago that he needs a referral concerning sleep apnea. Please call patient at number provided to discuss some possibilities. He seems very concerned with where he needs to go and who he should see.

## 2019-09-24 PROCEDURE — 93228 REMOTE 30 DAY ECG REV/REPORT: CPT | Performed by: INTERNAL MEDICINE

## 2019-10-02 ENCOUNTER — OFFICE VISIT (OUTPATIENT)
Dept: CARDIOLOGY CLINIC | Age: 74
End: 2019-10-02
Payer: MEDICARE

## 2019-10-02 VITALS
HEART RATE: 69 BPM | WEIGHT: 239 LBS | DIASTOLIC BLOOD PRESSURE: 79 MMHG | HEIGHT: 68 IN | SYSTOLIC BLOOD PRESSURE: 118 MMHG | BODY MASS INDEX: 36.22 KG/M2

## 2019-10-02 DIAGNOSIS — I10 BENIGN ESSENTIAL HTN: ICD-10-CM

## 2019-10-02 DIAGNOSIS — R94.31 ECG ABNORMAL: ICD-10-CM

## 2019-10-02 DIAGNOSIS — G45.9 TIA (TRANSIENT ISCHEMIC ATTACK): ICD-10-CM

## 2019-10-02 DIAGNOSIS — I49.3 VENTRICULAR PREMATURE BEATS: ICD-10-CM

## 2019-10-02 DIAGNOSIS — I45.10 RBBB: ICD-10-CM

## 2019-10-02 DIAGNOSIS — I49.3 FREQUENT PVCS: Primary | ICD-10-CM

## 2019-10-02 PROCEDURE — G8417 CALC BMI ABV UP PARAM F/U: HCPCS | Performed by: INTERNAL MEDICINE

## 2019-10-02 PROCEDURE — 99204 OFFICE O/P NEW MOD 45 MIN: CPT | Performed by: INTERNAL MEDICINE

## 2019-10-02 PROCEDURE — 1123F ACP DISCUSS/DSCN MKR DOCD: CPT | Performed by: INTERNAL MEDICINE

## 2019-10-02 PROCEDURE — G8484 FLU IMMUNIZE NO ADMIN: HCPCS | Performed by: INTERNAL MEDICINE

## 2019-10-02 PROCEDURE — G8427 DOCREV CUR MEDS BY ELIG CLIN: HCPCS | Performed by: INTERNAL MEDICINE

## 2019-10-02 PROCEDURE — 4040F PNEUMOC VAC/ADMIN/RCVD: CPT | Performed by: INTERNAL MEDICINE

## 2019-10-02 PROCEDURE — 1036F TOBACCO NON-USER: CPT | Performed by: INTERNAL MEDICINE

## 2019-10-02 PROCEDURE — 93000 ELECTROCARDIOGRAM COMPLETE: CPT | Performed by: INTERNAL MEDICINE

## 2019-10-02 RX ORDER — VITAMIN B COMPLEX
1 CAPSULE ORAL DAILY
COMMUNITY

## 2019-10-04 ENCOUNTER — TELEPHONE (OUTPATIENT)
Dept: CARDIOLOGY CLINIC | Age: 74
End: 2019-10-04

## 2019-10-04 ENCOUNTER — HOSPITAL ENCOUNTER (OUTPATIENT)
Dept: NON INVASIVE DIAGNOSTICS | Age: 74
Discharge: HOME | End: 2019-10-04
Payer: MEDICARE

## 2019-10-04 DIAGNOSIS — R94.31 ECG ABNORMAL: ICD-10-CM

## 2019-10-04 DIAGNOSIS — R94.39 ABNORMAL STRESS TEST: Primary | ICD-10-CM

## 2019-10-04 LAB
LV EF: 63 %
LVEF MODALITY: NORMAL

## 2019-10-04 PROCEDURE — 93017 CV STRESS TEST TRACING ONLY: CPT | Performed by: INTERNAL MEDICINE

## 2019-10-04 PROCEDURE — 78452 HT MUSCLE IMAGE SPECT MULT: CPT | Performed by: INTERNAL MEDICINE

## 2019-10-04 PROCEDURE — 6360000002 HC RX W HCPCS: Performed by: INTERNAL MEDICINE

## 2019-10-04 PROCEDURE — A9502 TC99M TETROFOSMIN: HCPCS | Performed by: INTERNAL MEDICINE

## 2019-10-04 PROCEDURE — 3430000000 HC RX DIAGNOSTIC RADIOPHARMACEUTICAL: Performed by: INTERNAL MEDICINE

## 2019-10-04 RX ADMIN — REGADENOSON 0.4 MG: 0.08 INJECTION, SOLUTION INTRAVENOUS at 13:40

## 2019-10-04 RX ADMIN — TETROFOSMIN 10 MILLICURIE: 1.38 INJECTION, POWDER, LYOPHILIZED, FOR SOLUTION INTRAVENOUS at 12:45

## 2019-10-04 RX ADMIN — TETROFOSMIN 30 MILLICURIE: 1.38 INJECTION, POWDER, LYOPHILIZED, FOR SOLUTION INTRAVENOUS at 14:10

## 2019-10-07 ENCOUNTER — TELEPHONE (OUTPATIENT)
Dept: CARDIOLOGY CLINIC | Age: 74
End: 2019-10-07

## 2019-10-14 ENCOUNTER — HOSPITAL ENCOUNTER (OUTPATIENT)
Dept: CARDIAC CATH/INVASIVE PROCEDURES | Age: 74
Discharge: HOME | End: 2019-10-14
Attending: INTERNAL MEDICINE | Admitting: INTERNAL MEDICINE
Payer: MEDICARE

## 2019-10-14 VITALS — BODY MASS INDEX: 36.22 KG/M2 | WEIGHT: 239 LBS | HEIGHT: 68 IN

## 2019-10-14 DIAGNOSIS — R94.39 ABNORMAL STRESS TEST: ICD-10-CM

## 2019-10-14 LAB
GFR AFRICAN AMERICAN: >60
GFR NON-AFRICAN AMERICAN: 59
GLUCOSE BLD-MCNC: 152 MG/DL (ref 70–99)
HEMOGLOBIN: 15.1 G/DL (ref 13.5–17.5)
PERFORMED ON: ABNORMAL
PLATELET # BLD: 182 K/UL (ref 135–450)
POC BUN: 10 MG/DL (ref 7–18)
POC CREATININE: 1.2 MG/DL (ref 0.8–1.3)
POC HEMATOCRIT: 43 % (ref 40.5–52.5)
POC POTASSIUM: 3.7 MMOL/L (ref 3.5–5.1)
POC SAMPLE TYPE: ABNORMAL
POC SODIUM: 144 MMOL/L (ref 136–145)
WBC # BLD: 6.9 K/UL (ref 4–11)

## 2019-10-14 PROCEDURE — 36415 COLL VENOUS BLD VENIPUNCTURE: CPT

## 2019-10-14 PROCEDURE — 33285 INSJ SUBQ CAR RHYTHM MNTR: CPT

## 2019-10-14 PROCEDURE — 85018 HEMOGLOBIN: CPT

## 2019-10-14 PROCEDURE — 82565 ASSAY OF CREATININE: CPT

## 2019-10-14 PROCEDURE — 84520 ASSAY OF UREA NITROGEN: CPT

## 2019-10-14 PROCEDURE — 85048 AUTOMATED LEUKOCYTE COUNT: CPT

## 2019-10-14 PROCEDURE — C1894 INTRO/SHEATH, NON-LASER: HCPCS

## 2019-10-14 PROCEDURE — 82947 ASSAY GLUCOSE BLOOD QUANT: CPT

## 2019-10-14 PROCEDURE — 85014 HEMATOCRIT: CPT

## 2019-10-14 PROCEDURE — C1769 GUIDE WIRE: HCPCS

## 2019-10-14 PROCEDURE — 99152 MOD SED SAME PHYS/QHP 5/>YRS: CPT

## 2019-10-14 PROCEDURE — 85049 AUTOMATED PLATELET COUNT: CPT

## 2019-10-14 PROCEDURE — 84295 ASSAY OF SERUM SODIUM: CPT

## 2019-10-14 PROCEDURE — 84132 ASSAY OF SERUM POTASSIUM: CPT

## 2019-10-14 PROCEDURE — 93458 L HRT ARTERY/VENTRICLE ANGIO: CPT

## 2019-10-14 PROCEDURE — 93005 ELECTROCARDIOGRAM TRACING: CPT | Performed by: INTERNAL MEDICINE

## 2019-10-14 PROCEDURE — 99152 MOD SED SAME PHYS/QHP 5/>YRS: CPT | Performed by: INTERNAL MEDICINE

## 2019-10-14 PROCEDURE — 2709999900 HC NON-CHARGEABLE SUPPLY

## 2019-10-14 PROCEDURE — 2500000003 HC RX 250 WO HCPCS

## 2019-10-14 PROCEDURE — 99153 MOD SED SAME PHYS/QHP EA: CPT

## 2019-10-14 PROCEDURE — 93458 L HRT ARTERY/VENTRICLE ANGIO: CPT | Performed by: INTERNAL MEDICINE

## 2019-10-14 PROCEDURE — 6360000002 HC RX W HCPCS

## 2019-10-14 PROCEDURE — 33285 INSJ SUBQ CAR RHYTHM MNTR: CPT | Performed by: INTERNAL MEDICINE

## 2019-10-14 PROCEDURE — 6360000004 HC RX CONTRAST MEDICATION: Performed by: INTERNAL MEDICINE

## 2019-10-14 PROCEDURE — C1764 EVENT RECORDER, CARDIAC: HCPCS

## 2019-10-14 PROCEDURE — 93010 ELECTROCARDIOGRAM REPORT: CPT | Performed by: INTERNAL MEDICINE

## 2019-10-14 RX ADMIN — IOPAMIDOL 34 ML: 755 INJECTION, SOLUTION INTRAVENOUS at 11:00

## 2019-10-15 LAB
EKG ATRIAL RATE: 67 BPM
EKG DIAGNOSIS: NORMAL
EKG P AXIS: 9 DEGREES
EKG P-R INTERVAL: 176 MS
EKG Q-T INTERVAL: 426 MS
EKG QRS DURATION: 96 MS
EKG QTC CALCULATION (BAZETT): 450 MS
EKG R AXIS: -37 DEGREES
EKG T AXIS: 20 DEGREES
EKG VENTRICULAR RATE: 67 BPM

## 2019-10-21 ENCOUNTER — NURSE ONLY (OUTPATIENT)
Dept: CARDIOLOGY CLINIC | Age: 74
End: 2019-10-21
Payer: MEDICARE

## 2019-10-21 DIAGNOSIS — I48.0 PAROXYSMAL ATRIAL FIBRILLATION (HCC): ICD-10-CM

## 2019-10-21 DIAGNOSIS — Z45.09 ENCOUNTER FOR ELECTRONIC ANALYSIS OF REVEAL EVENT RECORDER: Primary | ICD-10-CM

## 2019-10-21 PROCEDURE — 93291 INTERROG DEV EVAL SCRMS IP: CPT | Performed by: INTERNAL MEDICINE

## 2019-11-13 ENCOUNTER — OFFICE VISIT (OUTPATIENT)
Dept: INTERNAL MEDICINE CLINIC | Age: 74
End: 2019-11-13
Payer: MEDICARE

## 2019-11-13 VITALS
HEIGHT: 68 IN | SYSTOLIC BLOOD PRESSURE: 120 MMHG | WEIGHT: 238 LBS | RESPIRATION RATE: 16 BRPM | HEART RATE: 78 BPM | BODY MASS INDEX: 36.07 KG/M2 | DIASTOLIC BLOOD PRESSURE: 72 MMHG

## 2019-11-13 DIAGNOSIS — K21.9 GASTROESOPHAGEAL REFLUX DISEASE WITHOUT ESOPHAGITIS: ICD-10-CM

## 2019-11-13 DIAGNOSIS — I25.10 MILD CAD: ICD-10-CM

## 2019-11-13 DIAGNOSIS — E78.5 DYSLIPIDEMIA: ICD-10-CM

## 2019-11-13 DIAGNOSIS — I10 BENIGN ESSENTIAL HTN: ICD-10-CM

## 2019-11-13 DIAGNOSIS — E11.9 TYPE 2 DIABETES MELLITUS WITHOUT COMPLICATION, WITHOUT LONG-TERM CURRENT USE OF INSULIN (HCC): Primary | ICD-10-CM

## 2019-11-13 DIAGNOSIS — E66.01 CLASS 2 SEVERE OBESITY DUE TO EXCESS CALORIES WITH SERIOUS COMORBIDITY AND BODY MASS INDEX (BMI) OF 36.0 TO 36.9 IN ADULT (HCC): ICD-10-CM

## 2019-11-13 PROBLEM — E66.812 CLASS 2 SEVERE OBESITY DUE TO EXCESS CALORIES WITH SERIOUS COMORBIDITY AND BODY MASS INDEX (BMI) OF 36.0 TO 36.9 IN ADULT (HCC): Status: ACTIVE | Noted: 2019-11-13

## 2019-11-13 LAB
CHP ED QC CHECK: NORMAL
GLUCOSE BLD-MCNC: 94 MG/DL

## 2019-11-13 PROCEDURE — G8427 DOCREV CUR MEDS BY ELIG CLIN: HCPCS | Performed by: INTERNAL MEDICINE

## 2019-11-13 PROCEDURE — G8482 FLU IMMUNIZE ORDER/ADMIN: HCPCS | Performed by: INTERNAL MEDICINE

## 2019-11-13 PROCEDURE — 1036F TOBACCO NON-USER: CPT | Performed by: INTERNAL MEDICINE

## 2019-11-13 PROCEDURE — G8417 CALC BMI ABV UP PARAM F/U: HCPCS | Performed by: INTERNAL MEDICINE

## 2019-11-13 PROCEDURE — G8598 ASA/ANTIPLAT THER USED: HCPCS | Performed by: INTERNAL MEDICINE

## 2019-11-13 PROCEDURE — 99214 OFFICE O/P EST MOD 30 MIN: CPT | Performed by: INTERNAL MEDICINE

## 2019-11-13 PROCEDURE — 4040F PNEUMOC VAC/ADMIN/RCVD: CPT | Performed by: INTERNAL MEDICINE

## 2019-11-13 PROCEDURE — 1123F ACP DISCUSS/DSCN MKR DOCD: CPT | Performed by: INTERNAL MEDICINE

## 2019-11-13 PROCEDURE — 82962 GLUCOSE BLOOD TEST: CPT | Performed by: INTERNAL MEDICINE

## 2019-11-13 ASSESSMENT — PATIENT HEALTH QUESTIONNAIRE - PHQ9
2. FEELING DOWN, DEPRESSED OR HOPELESS: 0
SUM OF ALL RESPONSES TO PHQ9 QUESTIONS 1 & 2: 0
SUM OF ALL RESPONSES TO PHQ QUESTIONS 1-9: 0
SUM OF ALL RESPONSES TO PHQ QUESTIONS 1-9: 0
1. LITTLE INTEREST OR PLEASURE IN DOING THINGS: 0

## 2019-11-14 ENCOUNTER — NURSE ONLY (OUTPATIENT)
Dept: CARDIOLOGY CLINIC | Age: 74
End: 2019-11-14
Payer: MEDICARE

## 2019-11-14 ENCOUNTER — OFFICE VISIT (OUTPATIENT)
Dept: CARDIOLOGY CLINIC | Age: 74
End: 2019-11-14
Payer: MEDICARE

## 2019-11-14 VITALS
HEART RATE: 35 BPM | DIASTOLIC BLOOD PRESSURE: 74 MMHG | RESPIRATION RATE: 18 BRPM | BODY MASS INDEX: 35.61 KG/M2 | HEIGHT: 68 IN | WEIGHT: 235 LBS | SYSTOLIC BLOOD PRESSURE: 101 MMHG

## 2019-11-14 DIAGNOSIS — G45.9 TIA (TRANSIENT ISCHEMIC ATTACK): ICD-10-CM

## 2019-11-14 DIAGNOSIS — I10 BENIGN ESSENTIAL HTN: ICD-10-CM

## 2019-11-14 DIAGNOSIS — I45.10 RBBB: ICD-10-CM

## 2019-11-14 DIAGNOSIS — E66.9 OBESITY (BMI 30-39.9): ICD-10-CM

## 2019-11-14 DIAGNOSIS — I48.0 PAROXYSMAL ATRIAL FIBRILLATION (HCC): Primary | ICD-10-CM

## 2019-11-14 DIAGNOSIS — Z45.09 ENCOUNTER FOR ELECTRONIC ANALYSIS OF REVEAL EVENT RECORDER: ICD-10-CM

## 2019-11-14 PROCEDURE — G8482 FLU IMMUNIZE ORDER/ADMIN: HCPCS | Performed by: INTERNAL MEDICINE

## 2019-11-14 PROCEDURE — 93291 INTERROG DEV EVAL SCRMS IP: CPT | Performed by: INTERNAL MEDICINE

## 2019-11-14 PROCEDURE — 1036F TOBACCO NON-USER: CPT | Performed by: INTERNAL MEDICINE

## 2019-11-14 PROCEDURE — 1123F ACP DISCUSS/DSCN MKR DOCD: CPT | Performed by: INTERNAL MEDICINE

## 2019-11-14 PROCEDURE — G8598 ASA/ANTIPLAT THER USED: HCPCS | Performed by: INTERNAL MEDICINE

## 2019-11-14 PROCEDURE — 93000 ELECTROCARDIOGRAM COMPLETE: CPT | Performed by: INTERNAL MEDICINE

## 2019-11-14 PROCEDURE — 99214 OFFICE O/P EST MOD 30 MIN: CPT | Performed by: INTERNAL MEDICINE

## 2019-11-14 PROCEDURE — G8427 DOCREV CUR MEDS BY ELIG CLIN: HCPCS | Performed by: INTERNAL MEDICINE

## 2019-11-14 PROCEDURE — G8417 CALC BMI ABV UP PARAM F/U: HCPCS | Performed by: INTERNAL MEDICINE

## 2019-11-14 PROCEDURE — 4040F PNEUMOC VAC/ADMIN/RCVD: CPT | Performed by: INTERNAL MEDICINE

## 2019-11-15 ENCOUNTER — PROCEDURE VISIT (OUTPATIENT)
Dept: CARDIOLOGY CLINIC | Age: 74
End: 2019-11-15

## 2019-11-15 DIAGNOSIS — Z45.09 ENCOUNTER FOR ELECTRONIC ANALYSIS OF REVEAL EVENT RECORDER: ICD-10-CM

## 2019-11-27 ENCOUNTER — TELEPHONE (OUTPATIENT)
Dept: INTERNAL MEDICINE CLINIC | Age: 74
End: 2019-11-27

## 2019-11-27 DIAGNOSIS — I10 BENIGN ESSENTIAL HTN: ICD-10-CM

## 2019-11-27 RX ORDER — LOSARTAN POTASSIUM 100 MG/1
100 TABLET ORAL DAILY
Qty: 90 TABLET | Refills: 3 | Status: SHIPPED | OUTPATIENT
Start: 2019-11-27 | End: 2020-12-04 | Stop reason: SDUPTHER

## 2019-12-09 ENCOUNTER — TELEPHONE (OUTPATIENT)
Dept: INTERNAL MEDICINE CLINIC | Age: 74
End: 2019-12-09

## 2019-12-09 DIAGNOSIS — I10 BENIGN ESSENTIAL HTN: ICD-10-CM

## 2019-12-09 DIAGNOSIS — E78.5 DYSLIPIDEMIA: ICD-10-CM

## 2019-12-09 RX ORDER — EZETIMIBE 10 MG/1
10 TABLET ORAL DAILY
Qty: 90 TABLET | Refills: 3 | Status: CANCELLED | OUTPATIENT
Start: 2019-12-09

## 2019-12-09 RX ORDER — PRAVASTATIN SODIUM 80 MG/1
80 TABLET ORAL EVERY EVENING
Qty: 90 TABLET | Refills: 3 | Status: CANCELLED | OUTPATIENT
Start: 2019-12-09 | End: 2020-12-08

## 2019-12-09 RX ORDER — EZETIMIBE 10 MG/1
10 TABLET ORAL DAILY
Qty: 90 TABLET | Refills: 3 | Status: SHIPPED | OUTPATIENT
Start: 2019-12-09 | End: 2020-12-04 | Stop reason: SDUPTHER

## 2019-12-09 RX ORDER — AMLODIPINE BESYLATE 5 MG/1
5 TABLET ORAL DAILY
Qty: 90 TABLET | Refills: 3 | Status: SHIPPED | OUTPATIENT
Start: 2019-12-09 | End: 2020-09-16

## 2019-12-09 RX ORDER — PRAVASTATIN SODIUM 80 MG/1
80 TABLET ORAL EVERY EVENING
Qty: 90 TABLET | Refills: 3 | Status: SHIPPED | OUTPATIENT
Start: 2019-12-09 | End: 2020-12-04 | Stop reason: SDUPTHER

## 2019-12-09 RX ORDER — MOMETASONE FUROATE 50 UG/1
2 SPRAY, METERED NASAL DAILY
Qty: 3 INHALER | Refills: 3 | Status: SHIPPED | OUTPATIENT
Start: 2019-12-09

## 2019-12-09 RX ORDER — AMLODIPINE BESYLATE 5 MG/1
5 TABLET ORAL DAILY
Qty: 90 TABLET | Refills: 3 | Status: CANCELLED | OUTPATIENT
Start: 2019-12-09

## 2019-12-10 ENCOUNTER — OFFICE VISIT (OUTPATIENT)
Dept: PULMONOLOGY | Age: 74
End: 2019-12-10
Payer: MEDICARE

## 2019-12-10 VITALS
WEIGHT: 238 LBS | BODY MASS INDEX: 36.07 KG/M2 | HEART RATE: 68 BPM | OXYGEN SATURATION: 97 % | HEIGHT: 68 IN | SYSTOLIC BLOOD PRESSURE: 123 MMHG | DIASTOLIC BLOOD PRESSURE: 82 MMHG

## 2019-12-10 DIAGNOSIS — K21.9 GASTROESOPHAGEAL REFLUX DISEASE WITHOUT ESOPHAGITIS: Chronic | ICD-10-CM

## 2019-12-10 DIAGNOSIS — I25.10 MILD CAD: Chronic | ICD-10-CM

## 2019-12-10 DIAGNOSIS — E11.9 TYPE 2 DIABETES MELLITUS WITHOUT COMPLICATION, WITHOUT LONG-TERM CURRENT USE OF INSULIN (HCC): Chronic | ICD-10-CM

## 2019-12-10 DIAGNOSIS — G47.33 OBSTRUCTIVE SLEEP APNEA (ADULT) (PEDIATRIC): Primary | ICD-10-CM

## 2019-12-10 DIAGNOSIS — E66.01 CLASS 2 SEVERE OBESITY DUE TO EXCESS CALORIES WITH SERIOUS COMORBIDITY AND BODY MASS INDEX (BMI) OF 36.0 TO 36.9 IN ADULT (HCC): Chronic | ICD-10-CM

## 2019-12-10 DIAGNOSIS — I10 BENIGN ESSENTIAL HTN: Chronic | ICD-10-CM

## 2019-12-10 PROBLEM — E66.812 CLASS 2 SEVERE OBESITY DUE TO EXCESS CALORIES WITH SERIOUS COMORBIDITY AND BODY MASS INDEX (BMI) OF 36.0 TO 36.9 IN ADULT (HCC): Chronic | Status: ACTIVE | Noted: 2019-11-13

## 2019-12-10 PROCEDURE — 1123F ACP DISCUSS/DSCN MKR DOCD: CPT | Performed by: INTERNAL MEDICINE

## 2019-12-10 PROCEDURE — 1036F TOBACCO NON-USER: CPT | Performed by: INTERNAL MEDICINE

## 2019-12-10 PROCEDURE — G8427 DOCREV CUR MEDS BY ELIG CLIN: HCPCS | Performed by: INTERNAL MEDICINE

## 2019-12-10 PROCEDURE — G8482 FLU IMMUNIZE ORDER/ADMIN: HCPCS | Performed by: INTERNAL MEDICINE

## 2019-12-10 PROCEDURE — 4040F PNEUMOC VAC/ADMIN/RCVD: CPT | Performed by: INTERNAL MEDICINE

## 2019-12-10 PROCEDURE — G8598 ASA/ANTIPLAT THER USED: HCPCS | Performed by: INTERNAL MEDICINE

## 2019-12-10 PROCEDURE — G8417 CALC BMI ABV UP PARAM F/U: HCPCS | Performed by: INTERNAL MEDICINE

## 2019-12-10 PROCEDURE — 99204 OFFICE O/P NEW MOD 45 MIN: CPT | Performed by: INTERNAL MEDICINE

## 2019-12-10 RX ORDER — CLOPIDOGREL BISULFATE 75 MG/1
75 TABLET ORAL DAILY
COMMUNITY
End: 2020-07-15 | Stop reason: SDUPTHER

## 2019-12-10 ASSESSMENT — ENCOUNTER SYMPTOMS
NAUSEA: 0
CHOKING: 0
RHINORRHEA: 0
VOMITING: 0
PHOTOPHOBIA: 0
EYE PAIN: 0
CHEST TIGHTNESS: 0
ALLERGIC/IMMUNOLOGIC NEGATIVE: 1
ABDOMINAL PAIN: 0
APNEA: 1
ABDOMINAL DISTENTION: 0
SHORTNESS OF BREATH: 0

## 2019-12-10 ASSESSMENT — SLEEP AND FATIGUE QUESTIONNAIRES
HOW LIKELY ARE YOU TO NOD OFF OR FALL ASLEEP WHEN YOU ARE A PASSENGER IN A CAR FOR AN HOUR WITHOUT A BREAK: 3
HOW LIKELY ARE YOU TO NOD OFF OR FALL ASLEEP WHILE SITTING AND TALKING TO SOMEONE: 1
HOW LIKELY ARE YOU TO NOD OFF OR FALL ASLEEP IN A CAR, WHILE STOPPED FOR A FEW MINUTES IN TRAFFIC: 2
HOW LIKELY ARE YOU TO NOD OFF OR FALL ASLEEP WHILE SITTING AND READING: 3
HOW LIKELY ARE YOU TO NOD OFF OR FALL ASLEEP WHILE SITTING INACTIVE IN A PUBLIC PLACE: 1
HOW LIKELY ARE YOU TO NOD OFF OR FALL ASLEEP WHILE WATCHING TV: 3
HOW LIKELY ARE YOU TO NOD OFF OR FALL ASLEEP WHILE SITTING QUIETLY AFTER LUNCH WITHOUT ALCOHOL: 3
HOW LIKELY ARE YOU TO NOD OFF OR FALL ASLEEP WHILE LYING DOWN TO REST IN THE AFTERNOON WHEN CIRCUMSTANCES PERMIT: 3
ESS TOTAL SCORE: 19
NECK CIRCUMFERENCE (INCHES): 17

## 2019-12-12 ENCOUNTER — HOSPITAL ENCOUNTER (OUTPATIENT)
Dept: SLEEP CENTER | Age: 74
Discharge: HOME | End: 2019-12-12
Payer: MEDICARE

## 2019-12-12 DIAGNOSIS — G47.33 OBSTRUCTIVE SLEEP APNEA (ADULT) (PEDIATRIC): ICD-10-CM

## 2019-12-12 PROCEDURE — 95810 POLYSOM 6/> YRS 4/> PARAM: CPT | Performed by: INTERNAL MEDICINE

## 2019-12-12 PROCEDURE — 95810 POLYSOM 6/> YRS 4/> PARAM: CPT

## 2019-12-17 ENCOUNTER — HOSPITAL ENCOUNTER (OUTPATIENT)
Dept: SLEEP CENTER | Age: 74
Discharge: HOME | End: 2019-12-17
Payer: MEDICARE

## 2019-12-17 ENCOUNTER — TELEPHONE (OUTPATIENT)
Dept: PULMONOLOGY | Age: 74
End: 2019-12-17

## 2019-12-17 ENCOUNTER — NURSE ONLY (OUTPATIENT)
Dept: CARDIOLOGY CLINIC | Age: 74
End: 2019-12-17
Payer: MEDICARE

## 2019-12-17 DIAGNOSIS — G47.33 OBSTRUCTIVE SLEEP APNEA (ADULT) (PEDIATRIC): Primary | ICD-10-CM

## 2019-12-17 DIAGNOSIS — G47.33 OBSTRUCTIVE SLEEP APNEA (ADULT) (PEDIATRIC): ICD-10-CM

## 2019-12-17 DIAGNOSIS — G45.9 TIA (TRANSIENT ISCHEMIC ATTACK): ICD-10-CM

## 2019-12-17 DIAGNOSIS — I45.10 RBBB: ICD-10-CM

## 2019-12-17 DIAGNOSIS — Z45.09 ENCOUNTER FOR ELECTRONIC ANALYSIS OF REVEAL EVENT RECORDER: ICD-10-CM

## 2019-12-17 PROCEDURE — 93298 REM INTERROG DEV EVAL SCRMS: CPT | Performed by: INTERNAL MEDICINE

## 2019-12-17 PROCEDURE — 95811 POLYSOM 6/>YRS CPAP 4/> PARM: CPT

## 2019-12-17 PROCEDURE — 95811 POLYSOM 6/>YRS CPAP 4/> PARM: CPT | Performed by: INTERNAL MEDICINE

## 2019-12-17 PROCEDURE — 93299 PR REM INTERROG ICPMS/SCRMS <30 D TECH REVIEW: CPT | Performed by: INTERNAL MEDICINE

## 2019-12-18 ENCOUNTER — TELEPHONE (OUTPATIENT)
Dept: PULMONOLOGY | Age: 74
End: 2019-12-18

## 2019-12-23 ENCOUNTER — TELEPHONE (OUTPATIENT)
Dept: PULMONOLOGY | Age: 74
End: 2019-12-23

## 2020-01-02 ENCOUNTER — ANESTHESIA EVENT (OUTPATIENT)
Dept: ENDOSCOPY | Age: 75
End: 2020-01-02
Payer: MEDICARE

## 2020-01-10 ENCOUNTER — HOSPITAL ENCOUNTER (OUTPATIENT)
Age: 75
Discharge: HOME | End: 2020-01-10
Payer: MEDICARE

## 2020-01-10 LAB
A/G RATIO: 1.3 (ref 1.1–2.2)
ALBUMIN SERPL-MCNC: 4.1 G/DL (ref 3.4–5)
ALP BLD-CCNC: 87 U/L (ref 40–129)
ALT SERPL-CCNC: 17 U/L (ref 10–40)
ANION GAP SERPL CALCULATED.3IONS-SCNC: 16 MMOL/L (ref 3–16)
AST SERPL-CCNC: 16 U/L (ref 15–37)
BILIRUB SERPL-MCNC: 0.7 MG/DL (ref 0–1)
BUN BLDV-MCNC: 15 MG/DL (ref 7–20)
CALCIUM SERPL-MCNC: 9.8 MG/DL (ref 8.3–10.6)
CHLORIDE BLD-SCNC: 107 MMOL/L (ref 99–110)
CHOLESTEROL, TOTAL: 115 MG/DL (ref 0–199)
CO2: 20 MMOL/L (ref 21–32)
CREAT SERPL-MCNC: 1.2 MG/DL (ref 0.8–1.3)
GFR AFRICAN AMERICAN: >60
GFR NON-AFRICAN AMERICAN: 59
GLOBULIN: 3.1 G/DL
GLUCOSE BLD-MCNC: 109 MG/DL (ref 70–99)
HDLC SERPL-MCNC: 42 MG/DL (ref 40–60)
LDL CHOLESTEROL CALCULATED: 51 MG/DL
MAGNESIUM: 2.2 MG/DL (ref 1.8–2.4)
POTASSIUM SERPL-SCNC: 4.6 MMOL/L (ref 3.5–5.1)
SODIUM BLD-SCNC: 143 MMOL/L (ref 136–145)
TOTAL PROTEIN: 7.2 G/DL (ref 6.4–8.2)
TRIGL SERPL-MCNC: 109 MG/DL (ref 0–150)
VLDLC SERPL CALC-MCNC: 22 MG/DL

## 2020-01-10 PROCEDURE — 80061 LIPID PANEL: CPT

## 2020-01-10 PROCEDURE — 36415 COLL VENOUS BLD VENIPUNCTURE: CPT

## 2020-01-10 PROCEDURE — 83735 ASSAY OF MAGNESIUM: CPT

## 2020-01-10 PROCEDURE — 80053 COMPREHEN METABOLIC PANEL: CPT

## 2020-01-10 PROCEDURE — 83036 HEMOGLOBIN GLYCOSYLATED A1C: CPT

## 2020-01-11 LAB
ESTIMATED AVERAGE GLUCOSE: 128.4 MG/DL
HBA1C MFR BLD: 6.1 %

## 2020-01-15 ENCOUNTER — ANESTHESIA (OUTPATIENT)
Dept: ENDOSCOPY | Age: 75
End: 2020-01-15
Payer: MEDICARE

## 2020-01-15 ENCOUNTER — HOSPITAL ENCOUNTER (OUTPATIENT)
Age: 75
Setting detail: OUTPATIENT SURGERY
Discharge: HOME | End: 2020-01-15
Attending: INTERNAL MEDICINE | Admitting: INTERNAL MEDICINE
Payer: MEDICARE

## 2020-01-15 VITALS
BODY MASS INDEX: 34.71 KG/M2 | DIASTOLIC BLOOD PRESSURE: 86 MMHG | WEIGHT: 229 LBS | HEIGHT: 68 IN | OXYGEN SATURATION: 99 % | SYSTOLIC BLOOD PRESSURE: 132 MMHG | HEART RATE: 57 BPM | TEMPERATURE: 97.9 F | RESPIRATION RATE: 16 BRPM

## 2020-01-15 VITALS — SYSTOLIC BLOOD PRESSURE: 115 MMHG | OXYGEN SATURATION: 98 % | DIASTOLIC BLOOD PRESSURE: 72 MMHG

## 2020-01-15 PROCEDURE — 2709999900 HC NON-CHARGEABLE SUPPLY: Performed by: INTERNAL MEDICINE

## 2020-01-15 PROCEDURE — 3609010600 HC COLONOSCOPY POLYPECTOMY SNARE/COLD BIOPSY: Performed by: INTERNAL MEDICINE

## 2020-01-15 PROCEDURE — 2500000003 HC RX 250 WO HCPCS: Performed by: INTERNAL MEDICINE

## 2020-01-15 PROCEDURE — 7100000010 HC PHASE II RECOVERY - FIRST 15 MIN: Performed by: INTERNAL MEDICINE

## 2020-01-15 PROCEDURE — 3700000001 HC ADD 15 MINUTES (ANESTHESIA): Performed by: INTERNAL MEDICINE

## 2020-01-15 PROCEDURE — 6360000002 HC RX W HCPCS: Performed by: NURSE ANESTHETIST, CERTIFIED REGISTERED

## 2020-01-15 PROCEDURE — 6370000000 HC RX 637 (ALT 250 FOR IP): Performed by: INTERNAL MEDICINE

## 2020-01-15 PROCEDURE — 3700000000 HC ANESTHESIA ATTENDED CARE: Performed by: INTERNAL MEDICINE

## 2020-01-15 PROCEDURE — 2580000003 HC RX 258: Performed by: ANESTHESIOLOGY

## 2020-01-15 PROCEDURE — 2500000003 HC RX 250 WO HCPCS: Performed by: NURSE ANESTHETIST, CERTIFIED REGISTERED

## 2020-01-15 PROCEDURE — 7100000011 HC PHASE II RECOVERY - ADDTL 15 MIN: Performed by: INTERNAL MEDICINE

## 2020-01-15 RX ORDER — PROPOFOL 10 MG/ML
INJECTION, EMULSION INTRAVENOUS PRN
Status: DISCONTINUED | OUTPATIENT
Start: 2020-01-15 | End: 2020-01-15 | Stop reason: SDUPTHER

## 2020-01-15 RX ORDER — SODIUM CHLORIDE 9 MG/ML
INJECTION, SOLUTION INTRAVENOUS CONTINUOUS
Status: DISCONTINUED | OUTPATIENT
Start: 2020-01-15 | End: 2020-01-15 | Stop reason: HOSPADM

## 2020-01-15 RX ORDER — LIDOCAINE HYDROCHLORIDE 10 MG/ML
1 INJECTION, SOLUTION EPIDURAL; INFILTRATION; INTRACAUDAL; PERINEURAL
Status: DISCONTINUED | OUTPATIENT
Start: 2020-01-15 | End: 2020-01-15 | Stop reason: HOSPADM

## 2020-01-15 RX ORDER — LIDOCAINE HYDROCHLORIDE 20 MG/ML
INJECTION, SOLUTION EPIDURAL; INFILTRATION; INTRACAUDAL; PERINEURAL PRN
Status: DISCONTINUED | OUTPATIENT
Start: 2020-01-15 | End: 2020-01-15 | Stop reason: SDUPTHER

## 2020-01-15 RX ADMIN — PROPOFOL 50 MG: 10 INJECTION, EMULSION INTRAVENOUS at 12:00

## 2020-01-15 RX ADMIN — PROPOFOL 50 MG: 10 INJECTION, EMULSION INTRAVENOUS at 12:02

## 2020-01-15 RX ADMIN — LIDOCAINE HYDROCHLORIDE 100 MG: 20 INJECTION, SOLUTION EPIDURAL; INFILTRATION; INTRACAUDAL; PERINEURAL at 11:57

## 2020-01-15 RX ADMIN — PROPOFOL 30 MG: 10 INJECTION, EMULSION INTRAVENOUS at 11:57

## 2020-01-15 RX ADMIN — PROPOFOL 20 MG: 10 INJECTION, EMULSION INTRAVENOUS at 12:06

## 2020-01-15 RX ADMIN — SODIUM CHLORIDE: 9 INJECTION, SOLUTION INTRAVENOUS at 11:49

## 2020-01-15 RX ADMIN — PROPOFOL 20 MG: 10 INJECTION, EMULSION INTRAVENOUS at 12:09

## 2020-01-15 RX ADMIN — PROPOFOL 10 MG: 10 INJECTION, EMULSION INTRAVENOUS at 12:12

## 2020-01-15 ASSESSMENT — PAIN SCALES - GENERAL
PAINLEVEL_OUTOF10: 0

## 2020-01-15 ASSESSMENT — PAIN - FUNCTIONAL ASSESSMENT: PAIN_FUNCTIONAL_ASSESSMENT: 0-10

## 2020-01-15 ASSESSMENT — ENCOUNTER SYMPTOMS: SHORTNESS OF BREATH: 0

## 2020-01-15 NOTE — ANESTHESIA POSTPROCEDURE EVALUATION
Department of Anesthesiology  Postprocedure Note    Patient: Marcela Bishop  MRN: [id number]  YOB: 1945  Date of evaluation: 1/15/2020  Time:  12:21 PM     Procedure Summary     Date:  01/15/20 Room / Location:  [address] [place] [address]    Anesthesia Start:  5400 Anesthesia Stop:      Procedure:  COLONOSCOPY POLYPECTOMY SNARE/COLD BIOPSY (N/A ) Diagnosis:  (HISTORY OF COLON POLYPS Z86.010)    Surgeon:  Quiana Phoenix MD Responsible Provider:  Guicho Hauser MD    Anesthesia Type:  MAC ASA Status:  3          Anesthesia Type: MAC    Mikayla Phase I: Mikayla Score: 10    Mikayla Phase II:      Last vitals: Reviewed and per EMR flowsheets.        Anesthesia Post Evaluation    Patient location during evaluation: PACU  Patient participation: complete - patient participated  Level of consciousness: awake  Airway patency: patent  Nausea & Vomiting: no vomiting and no nausea  Complications: no  Cardiovascular status: hemodynamically stable  Respiratory status: acceptable  Hydration status: stable

## 2020-01-15 NOTE — ANESTHESIA PRE PROCEDURE
Department of Anesthesiology  Preprocedure Note       Name:  Mason Teague   Age:  66 y.o.  :  1945                                          MRN:  [id number]         Date:  1/15/2020      Surgeon: Ryan Aponte):  Cholo Dowling MD    Procedure: COLONOSCOPY DIAGNOSTIC (N/A )    Medications prior to admission:   Prior to Admission medications    Medication Sig Start Date End Date Taking? Authorizing Provider   clopidogrel (PLAVIX) 75 MG tablet Take 75 mg by mouth daily    Historical Provider, MD   mometasone (NASONEX) 50 MCG/ACT nasal spray 2 sprays by Each Nostril route daily 19   Scotty Bartholomew DO   pravastatin (PRAVACHOL) 80 MG tablet Take 1 tablet by mouth every evening 19  Scotty Bartholomew DO   ezetimibe (ZETIA) 10 MG tablet Take 1 tablet by mouth daily 19   Scotty Stewart DO   amLODIPine (NORVASC) 5 MG tablet Take 1 tablet by mouth daily 19   Scotty Stewart DO   losartan (COZAAR) 100 MG tablet Take 1 tablet by mouth daily 19  Scotty Stewart DO   b complex vitamins capsule Take 1 capsule by mouth daily    Historical Provider, MD   omeprazole (PRILOSEC) 40 MG delayed release capsule Take 1 capsule by mouth daily 19   Scotty Stewart DO   clopidogrel (PLAVIX) 75 MG tablet Take 1 tablet by mouth daily 19  Tino Rush MD   solifenacin (VESICARE) 10 MG tablet Take 5 mg by mouth daily    Historical Provider, MD   tamsulosin (FLOMAX) 0.4 MG capsule Take 1 capsule by mouth daily 18   Scotty Stewart DO   aspirin 81 MG tablet Take 81 mg by mouth daily    Historical Provider, MD   Cholecalciferol (VITAMIN D) 2000 UNITS CAPS capsule Take 1 capsule by mouth daily     Historical Provider, MD   Glucosamine HCl-MSM (GLUCOSAMINE-MSM PO) Take 1 tablet by mouth 2 times daily     Historical Provider, MD       Current medications:    No current outpatient medications on file. No current facility-administered medications for this visit. Allergies:  No Known Allergies    Problem List:    Patient Active Problem List   Diagnosis Code    Arthritis M19.90    Gastroesophageal reflux disease without esophagitis K21.9    Diverticulosis K57.90    Osteoarthritis M19.90    Benign prostatic hyperplasia N40.0    Kidney stones N20.0    Anemia D64.9    Polyp, colonic K63.5    Obstructive sleep apnea (adult) (pediatric) G47.33    Benign essential HTN C29    Metabolic syndrome F67.33    Dyslipidemia E78.5    Bilateral leg edema R60.0    RBBB I45.10    Insomnia G47.00    Type 2 diabetes mellitus without complication, without long-term current use of insulin ([facility]) E11.9    TIA (transient ischemic attack) G45.9    Abnormal stress test R94.39    Encounter for electronic analysis of reveal event recorder Z45.09    Mild CAD I25.10    Class 2 severe obesity due to excess calories with serious comorbidity and body mass index (BMI) of 36.0 to 36.9 in adult ([facility]) E66.01, Z68.36       Past Medical History:        Diagnosis Date    BPH     Colon polyps     DDD (degenerative disc disease), lumbar     Diverticulosis     Gallstone pancreatitis 09/05/2017    GERD (gastroesophageal reflux disease)     Hyperlipidemia     Hypertension     Impaired fasting blood sugar     Kidney stones 11/10    Lumbar spondylosis     Mild CAD 10/14/2019    Dr. Mort[facility]za Hutton, [facility]    Mild CAD 11/13/2019    OAB (overactive bladder)     AUDIE (obstructive sleep apnea) 6/4/2013    Osteoarthritis     Osteoarthritis of right knee     Pneumonia     pneumonia    RBBB 12/19/2016    Renal cyst     Retinal vasculitis     Retinal vasculitis     Right hemiparesis ([address] 7/25/2019    Right ureteral stone     Type 2 diabetes mellitus without complication, without long-term current use of insulin ([facility])     Type 2 diabetes mellitus without complication, without long-term current use of insulin ([address]        Past Surgical History:        Procedure Laterality Date    APPENDECTOMY      BONE RESECTION Left     bone spur removal left elbow    CARDIAC CATHETERIZATION  3/23/2012    CATARACT REMOVAL WITH IMPLANT Left 2017    CATARACT REMOVAL WITH IMPLANT Right 10/2017    CHOLECYSTECTOMY, LAPAROSCOPIC  2017    COLONOSCOPY  2008    multiple    CYSTOSCOPY  2014    Retrograde Pyelogram, stent, Dr. Cheyenne Fair N/A 2018    CYSTOSCOPY, LEFT RETROGRADE PYELOGRAM, LEFT STENT PLACEMENT performed by Danika Cohen MD at [facility] 2019    CYSTOSCOPY LEFT URETEROSCOPY HOLMIUM LASER LITHOTRIPSY, STONE MANIPULATION WITH LEFT STENT EXCHANGE performed by Danika Cohen MD at [address] [place] [address] RIGHT COLECTOMY  3/28/2012    laparoscopic    TONSILLECTOMY         Social History:    Social History     Tobacco Use    Smoking status: Former Smoker     Years: 16.00     Types: Cigarettes     Last attempt to quit: 1977     Years since quittin.6    Smokeless tobacco: Never Used    Tobacco comment:    Substance Use Topics    Alcohol use: Yes     Alcohol/week: 1.7 standard drinks     Types: 2 Standard drinks or equivalent per week     Comment: ONCE A WEEK                                Counseling given: Not Answered  Comment:       Vital Signs (Current): There were no vitals filed for this visit.                                            BP Readings from Last 3 Encounters:   12/10/19 123/82   19 101/74   19 120/72       NPO Status:                                                                                 BMI:   Wt Readings from Last 3 Encounters:   12/10/19 238 lb (108 kg)   19 235 lb (106.6 kg)   19 238 lb (108 kg)     There is no height or weight on file to calculate BMI.    CBC:   Lab Results   Component Value Date    WBC 6.9 10/14/2019    RBC 4.84 2019    HGB 15.1 10/14/2019    HCT 45.7 2019    MCV 94.4 2019    RDW 13.6 08/26/2019     10/14/2019       CMP:   Lab Results   Component Value Date     01/10/2020    K 4.6 01/10/2020    K 4.4 07/25/2019     01/10/2020    CO2 20 01/10/2020    BUN 15 01/10/2020    CREATININE 1.2 01/10/2020    GFRAA >60 01/10/2020    GFRAA >60 06/04/2013    AGRATIO 1.3 01/10/2020    LABGLOM 59 01/10/2020    GLUCOSE 109 01/10/2020    PROT 7.2 01/10/2020    PROT 7.5 12/04/2012    CALCIUM 9.8 01/10/2020    BILITOT 0.7 01/10/2020    ALKPHOS 87 01/10/2020    AST 16 01/10/2020    ALT 17 01/10/2020       POC Tests: No results for input(s): POCGLU, POCNA, POCK, POCCL, POCBUN, POCHEMO, POCHCT in the last 72 hours. Coags:   Lab Results   Component Value Date    PROTIME 11.4 07/25/2019    INR 1.00 07/25/2019    APTT 28.5 07/25/2019       HCG (If Applicable): No results found for: PREGTESTUR, PREGSERUM, HCG, HCGQUANT     ABGs: No results found for: PHART, PO2ART, END9UYR, KNM2CYS, BEART, U7UFPBMN     Type & Screen (If Applicable):  No results found for: LABABO, [address]    Anesthesia Evaluation  Patient summary reviewed and Nursing notes reviewed history of anesthetic complications (patient required narcan after one anesthetic per patient wife): Airway: Mallampati: II  TM distance: >3 FB   Neck ROM: full  Mouth opening: > = 3 FB Dental:    (+) caps  Comment: Multiple unk cap loc  Broken cap bottom left molar    Pulmonary: breath sounds clear to auscultation  (+) sleep apnea: on noncompliant,      (-) asthma and shortness of breath                           Cardiovascular:  Exercise tolerance: good (>4 METS),   (+) hypertension:, hyperlipidemia    (-)  angina      Rhythm: regular  Rate: normal                 ROS comment: 2016 GUI    \"Summary      Technically limited study due to lung interface.      Normal left ventricle size and systolic function with an estimated   ejection fraction of 55%.  No regional wall motion abnormalities are seen.      There is mild concentric left ventricular

## 2020-01-15 NOTE — PROGRESS NOTES
To endo recovery from procedure room. VSS, awakens to voice, respirations easy and unlabored. Post-procedure education reviewed with patient and visitor, and they verbalized understanding.

## 2020-01-20 NOTE — PROGRESS NOTES
[facility] remote Linq report shows AF recordings to be sinus w ectopy. Will have EP review. Pt is not on anti coags. We will continue to monitor remotely.

## 2020-01-21 ENCOUNTER — NURSE ONLY (OUTPATIENT)
Dept: CARDIOLOGY CLINIC | Age: 75
End: 2020-01-21
Payer: MEDICARE

## 2020-01-21 PROCEDURE — G2066 INTER DEVC REMOTE 30D: HCPCS | Performed by: INTERNAL MEDICINE

## 2020-01-21 PROCEDURE — 93298 REM INTERROG DEV EVAL SCRMS: CPT | Performed by: INTERNAL MEDICINE

## 2020-01-21 NOTE — LETTER
[address] [facility] [phone]  [address] [phone]    Pacemaker/Defibrillator Clinic          01/20/20        [address] [place] [address]        Dear Arby Olszewski    This letter is to inform you that we received the transmission from your monitor at home that checks your implanted heart device. The next date your monitor will automatically transmit will be 3-23-20. If your report needs attention we will notify you. Your device and monitor are wireless and most transmit cellularly, but please periodically check your monitor is still plugged in to the electrical outlet. If you still use the telephone land line to send please ensure the connection to the phone misty is secure. This will help to ensure successful automatic transmissions in the future. Also, the monitor needs to be close to you while sleeping at night. Please be aware that the remote device transmission sites are periodically monitored only during regular business hours during which simultaneous in-office device clinics are being run. If your transmission requires attention, we will contact you as soon as possible. Thank you.             [address]   '

## 2020-02-12 ENCOUNTER — OFFICE VISIT (OUTPATIENT)
Dept: INTERNAL MEDICINE CLINIC | Age: 75
End: 2020-02-12
Payer: MEDICARE

## 2020-02-12 VITALS
SYSTOLIC BLOOD PRESSURE: 126 MMHG | RESPIRATION RATE: 16 BRPM | BODY MASS INDEX: 34.21 KG/M2 | WEIGHT: 225 LBS | HEART RATE: 68 BPM | DIASTOLIC BLOOD PRESSURE: 70 MMHG

## 2020-02-12 PROBLEM — E66.9 OBESITY (BMI 30-39.9): Status: ACTIVE | Noted: 2020-02-12

## 2020-02-12 PROBLEM — E66.01 CLASS 2 SEVERE OBESITY DUE TO EXCESS CALORIES WITH SERIOUS COMORBIDITY AND BODY MASS INDEX (BMI) OF 36.0 TO 36.9 IN ADULT (HCC): Chronic | Status: RESOLVED | Noted: 2019-11-13 | Resolved: 2020-02-12

## 2020-02-12 PROBLEM — E66.812 CLASS 2 SEVERE OBESITY DUE TO EXCESS CALORIES WITH SERIOUS COMORBIDITY AND BODY MASS INDEX (BMI) OF 36.0 TO 36.9 IN ADULT (HCC): Chronic | Status: RESOLVED | Noted: 2019-11-13 | Resolved: 2020-02-12

## 2020-02-12 PROCEDURE — 1036F TOBACCO NON-USER: CPT | Performed by: INTERNAL MEDICINE

## 2020-02-12 PROCEDURE — G8417 CALC BMI ABV UP PARAM F/U: HCPCS | Performed by: INTERNAL MEDICINE

## 2020-02-12 PROCEDURE — G8482 FLU IMMUNIZE ORDER/ADMIN: HCPCS | Performed by: INTERNAL MEDICINE

## 2020-02-12 PROCEDURE — 1123F ACP DISCUSS/DSCN MKR DOCD: CPT | Performed by: INTERNAL MEDICINE

## 2020-02-12 PROCEDURE — 99214 OFFICE O/P EST MOD 30 MIN: CPT | Performed by: INTERNAL MEDICINE

## 2020-02-12 PROCEDURE — 4040F PNEUMOC VAC/ADMIN/RCVD: CPT | Performed by: INTERNAL MEDICINE

## 2020-02-12 PROCEDURE — G8427 DOCREV CUR MEDS BY ELIG CLIN: HCPCS | Performed by: INTERNAL MEDICINE

## 2020-02-12 ASSESSMENT — PATIENT HEALTH QUESTIONNAIRE - PHQ9
SUM OF ALL RESPONSES TO PHQ9 QUESTIONS 1 & 2: 0
SUM OF ALL RESPONSES TO PHQ QUESTIONS 1-9: 0
SUM OF ALL RESPONSES TO PHQ QUESTIONS 1-9: 0
2. FEELING DOWN, DEPRESSED OR HOPELESS: 0
1. LITTLE INTEREST OR PLEASURE IN DOING THINGS: 0

## 2020-02-12 NOTE — PROGRESS NOTES
[facility]) Physicians  Internal Medicine  Patient Encounter  Tristin Hart D.O., [facility]        Chief Complaint   Patient presents with   [facility]    Medication Check     HPI  Patient ID: Nancy Burden is a 66 y.o. male seen today for follow up regarding the status of his current chronic medical problems below along with medication review. Patient has since been diagnosed with severe sleep apnea. See below  Otherwise he reports no major changes. Patient also complains of itchy skin on the inside of his gluteal region on the left. He did see a dermatologist who recommended triamcinolone cream.  We do not have correspondence. Patient has not yet started to use that. Diabetes Mellitus Type II, Follow-up--   Lab Results   Component Value Date    LABA1C 6.1 01/10/2020      Lab Results   Component Value Date    .4 01/10/2020     Patient has been diagnosed with diabetes by virtue of several A1c levels above 6.5%. He endorses no symptoms suggestive of glucose toxicity. He denies any excessive thirst or frequent urination. He denies any blurry vision. He has lost 13# since December. He is following a low carb diet. AM sugars-- 107-120. Last Eye Exam: 6/4/2019   U. Microalbumin/Cr: 5/4/2018, overdue  Pt is on an ARB. Complications-- none  Insulin Treated? No.    ASA: Yes. Tobacco: No   Foot exam--5/8/2019    HTN--patient feels his blood pressure is well controlled. He has not experienced any similar symptoms like he did in July when diagnosed with a TIA. He denies any episodes of unilateral weakness or paresthesias. He denies any headaches, dizziness, lightheadedness, or syncope. Hyperlipidemia--   Lab Results   Component Value Date    LDLCALC 51 01/10/2020     Patient denies any new side effects from his statin. He denies any muscle aches or muscle cramping. He has lost 13# since December. GERD--Patient denies any problems with heartburn or dysphagia.   He is currently on omeprazole. Lab Results   Component Value Date    MG 2.20 01/10/2020       CAD--This was diagnosed via a heart catheterization. Last summer he went to the emergency room and was diagnosed with a TIA. He was placed on aspirin and Plavix. He was given a 30-day event recorder. He has since seen the electrophysiologist.  He now has an internal loop recorder. He denies any lightheadedness or syncope. His heart monitor showed a significant burden of PVCs including bigeminy. He will have a follow up with Dr. Domitila Boles 2/20/2020. He denies CP, palpitations, SOB at rest.  He does have FALCON. He does try to work out 3-4 days per week doing water aerobics. There was some speculation that he may have had atrial fibrillation but after evaluation it may be sinus mechanism with frequent ectopy. Severe sleep apnea-- This is a new diagnoses. He is on CPAP. He is more alert.         Past Medical History:   Diagnosis Date    BPH     Colon polyps     DDD (degenerative disc disease), lumbar     Diverticulosis     Gallstone pancreatitis 09/05/2017    GERD (gastroesophageal reflux disease)     Hyperlipidemia     Hypertension     Impaired fasting blood sugar     Kidney stones 11/10    Lumbar spondylosis     Mild CAD 10/14/2019    Dr. Sai Samson, [facility]    Mild CAD 11/13/2019    OAB (overactive bladder)     AUDIE (obstructive sleep apnea) 6/4/2013    Osteoarthritis     Osteoarthritis of right knee     Pneumonia     pneumonia    RBBB 12/19/2016    Renal cyst     Retinal vasculitis     Retinal vasculitis     Right hemiparesis ([facility] 7/25/2019    Right ureteral stone     Type 2 diabetes mellitus without complication, without long-term current use of insulin ([facility])     diet controlled    Type 2 diabetes mellitus without complication, without long-term current use of insulin ([address]        Review of Systems--   As per HPI      Physical Exam  Vitals:    02/12/20 1305   BP: 126/70   Pulse: 68   Resp: 16   Weight: 225 lb (102.1 kg)     Body mass index is 34.21 kg/m². Wt Readings from Last 3 Encounters:   02/12/20 225 lb (102.1 kg)   01/15/20 229 lb (103.9 kg)   12/10/19 238 lb (108 kg)     BP Readings from Last 3 Encounters:   02/12/20 126/70   01/15/20 115/72   01/15/20 132/86        GEN: A&O, pleasant and cooperative. Looks thinner  HEENT: NUBIA, EOMI, oral cavity is clear with no mucosal lesions. Mucous members are moist.  Throat NL. Tongue midline. NECK: Supple, no thyromegaly. No masses. No JVD. Trachea midline. LYMPH:  No C/SC nodes  CV: Regular rhythm with frequent ectopy. No murmurs. VASC: No carotid bruits. Pedal pulses symmetrical  PULM: CTA. GI:  Abd Soft. NT,  ND, BS +, No masses. No hepatomegaly. Obese  EXT: Trace BL LE edema. No worse  SKIN: Medial portion of the left buttock in the gluteal cleft and perianal region there is a mildly hypopigmented area with some papular lesions excoriations. NEURO: No focal or lateralizing deficits. No ataxia. Moves all extremities symmetrically      ASSESSMENT/PLAN:    1. Type 2 diabetes mellitus without complication, without long-term current use of insulin ([address]  His diabetes is well controlled  U. MALB/Cr   Continue lifestyle modification as he is doing    2. Bradycardia  Actually patient was not quite is bradycardic as the MA check and vital signs indicated. The pulse oximeter was not picking up all of his heartbeats. Continue follow-up with the electrophysiologist  - EKG 12 Lead    3. Obesity (BMI 30-39. 9)  Condition is improved  Continue lifestyle modification and weight loss efforts    4. Dyslipidemia  Condition is well controlled  Continue current regimen  Continue lifestyle approach    5. Benign essential HTN  Blood pressure is well controlled  Continue current medications cautiously  As he loses weight continue to monitor for hypotension    6. Frequent PVCs  Condition is stable and asymptomatic  Continue to follow-up with electrophysiology.     7. Mild CAD  Found on heart cath  Condition is stable without signs of angina or cardiac decompensation. 8. Severe sleep apnea  Condition is improved  Continue CPAP      9.   Perianal dermatitis  Use the triamcinolone cream  Follow-up with dermatology

## 2020-02-14 LAB
CREATININE URINE: 115.3 MG/DL (ref 39–259)
MICROALBUMIN UR-MCNC: <1.2 MG/DL
MICROALBUMIN/CREAT UR-RTO: NORMAL MG/G (ref 0–30)

## 2020-02-19 NOTE — PROGRESS NOTES
[facility]       Electrophysiology     Date: 2/20/2020    CC: Fatigue/PVC's    HPI: Courtney Cueto is a 66 y.o. Male with a PMH of HTN, RBBB, HLD and Type 2 diabetes. On 7/25/19 he presented to [facility] ED with complaints of fatigue and right sided weakness. Was dx with TIA and discharged on plavix and asa with a 30 day monitor. 10/14/19 ILR insertion for cryptogenic stroke    He has a new diagnosis of severe AUDIE and uses CPAP    Today's interrogation of loop monitor shows frequent PVC's    Interval history:  He states he is doing OK.   He does get dyspnea on exertion     Past Medical History:   Diagnosis Date    BPH     Colon polyps     DDD (degenerative disc disease), lumbar     Diverticulosis     Gallstone pancreatitis 09/05/2017    GERD (gastroesophageal reflux disease)     Hyperlipidemia     Hypertension     Impaired fasting blood sugar     Kidney stones 11/10    Lumbar spondylosis     Mild CAD 10/14/2019    [facility]. Tiffanie Gardiner, [facility]    Mild CAD 11/13/2019    OAB (overactive bladder)     AUDIE (obstructive sleep apnea) 6/4/2013    Osteoarthritis     Osteoarthritis of right knee     Pneumonia     pneumonia    RBBB 12/19/2016    Renal cyst     Retinal vasculitis     Retinal vasculitis     Right hemiparesis ([address] 7/25/2019    Right ureteral stone     Type 2 diabetes mellitus without complication, without long-term current use of insulin ([facility])     diet controlled    Type 2 diabetes mellitus without complication, without long-term current use of insulin ([address]         Past Surgical History:   Procedure Laterality Date    APPENDECTOMY      BONE RESECTION Left     bone spur removal left elbow    CARDIAC CATHETERIZATION  3/23/2012    CATARACT REMOVAL WITH IMPLANT Left 11/01/2017    CATARACT REMOVAL WITH IMPLANT Right 10/2017    CHOLECYSTECTOMY, LAPAROSCOPIC  09/07/2017    COLONOSCOPY  7/2008    multiple    COLONOSCOPY N/A 1/15/2020    COLONOSCOPY POLYPECTOMY SNARE/COLD BIOPSY performed by

## 2020-02-20 ENCOUNTER — NURSE ONLY (OUTPATIENT)
Dept: CARDIOLOGY CLINIC | Age: 75
End: 2020-02-20
Payer: MEDICARE

## 2020-02-20 ENCOUNTER — OFFICE VISIT (OUTPATIENT)
Dept: CARDIOLOGY CLINIC | Age: 75
End: 2020-02-20
Payer: MEDICARE

## 2020-02-20 VITALS
RESPIRATION RATE: 20 BRPM | HEART RATE: 61 BPM | DIASTOLIC BLOOD PRESSURE: 79 MMHG | WEIGHT: 232 LBS | BODY MASS INDEX: 35.16 KG/M2 | HEIGHT: 68 IN | SYSTOLIC BLOOD PRESSURE: 114 MMHG

## 2020-02-20 PROCEDURE — 93291 INTERROG DEV EVAL SCRMS IP: CPT | Performed by: INTERNAL MEDICINE

## 2020-02-20 PROCEDURE — 1036F TOBACCO NON-USER: CPT | Performed by: INTERNAL MEDICINE

## 2020-02-20 PROCEDURE — G8417 CALC BMI ABV UP PARAM F/U: HCPCS | Performed by: INTERNAL MEDICINE

## 2020-02-20 PROCEDURE — 99214 OFFICE O/P EST MOD 30 MIN: CPT | Performed by: INTERNAL MEDICINE

## 2020-02-20 PROCEDURE — 1123F ACP DISCUSS/DSCN MKR DOCD: CPT | Performed by: INTERNAL MEDICINE

## 2020-02-20 PROCEDURE — 93000 ELECTROCARDIOGRAM COMPLETE: CPT | Performed by: INTERNAL MEDICINE

## 2020-02-20 PROCEDURE — G8482 FLU IMMUNIZE ORDER/ADMIN: HCPCS | Performed by: INTERNAL MEDICINE

## 2020-02-20 PROCEDURE — 4040F PNEUMOC VAC/ADMIN/RCVD: CPT | Performed by: INTERNAL MEDICINE

## 2020-02-20 PROCEDURE — G8427 DOCREV CUR MEDS BY ELIG CLIN: HCPCS | Performed by: INTERNAL MEDICINE

## 2020-02-20 NOTE — PROGRESS NOTES
Patient comes in for interrogation of their implanted loop recorder. Interrogation shows 3 Naren episodes during sleep time hours. Last on 2/8/2020, longest 16 seconds with a MinHR <30 bpm.  327 AF episodes that appear to be sinus with ectopy. Today we changed the AF sensitivity to least sensitive from less sensitive. Patient will see Dr. Jake Santiago today.

## 2020-03-02 NOTE — PROGRESS NOTES
COLONOSCOPY POLYPECTOMY SNARE/COLD BIOPSY performed by Norma Garcia MD at [address]  2014    Retrograde Pyelogram, stent, Dr. Rosa Tran N/A 2018    CYSTOSCOPY, LEFT RETROGRADE PYELOGRAM, LEFT STENT PLACEMENT performed by Vandana Vazquez MD at [address] Left 2019    CYSTOSCOPY LEFT URETEROSCOPY HOLMIUM LASER LITHOTRIPSY, STONE MANIPULATION WITH LEFT STENT EXCHANGE performed by Vandana Vazquez MD at [facility]      RIGHT COLECTOMY  3/28/2012    laparoscopic    TONSILLECTOMY       Family History   Problem Relation Age of Onset    Mental Illness Mother         Alzheimers    Cancer Father         Leukemia    Diabetes Brother     Sleep Apnea Brother     Other Sister         pyloric valve repacement     Social History     Socioeconomic History    Marital status:      Spouse name: Pablo Isbell Number of children: 3    Years of education: 12    Highest education level: Not on file   Occupational History    Not on file   Social Needs    Financial resource strain: Not on file    Food insecurity     Worry: Not on file     Inability: Not on file   Psy[employer] needs     Medical: Not on file     Non-medical: Not on file   Tobacco Use    Smoking status: Former Smoker     Years: 16.00     Types: Cigarettes     Last attempt to quit: 1977     Years since quittin.8    Smokeless tobacco: Never Used    Tobacco comment:    Substance and Sexual Activity    Alcohol use:  Yes     Alcohol/week: 4.0 standard drinks     Types: 2 Standard drinks or equivalent, 1 Cans of beer, 1 Glasses of wine per week     Comment: ONCE A WEEK    Drug use: Never    Sexual activity: Yes     Partners: Female   Lifestyle    Physical activity     Days per week: Not on file     Minutes per session: Not on file    Stress: Not on file   Relationships    Social connections     Talks on phone: Not on file ischemia with lexiscan   Normal LV size and systolic function.   Myocardial perfusion imaging with small area of decreased uptake in the   inferoapical wall with stress and rest consistent with scar in the territory   typical of the RCA, Cx or LAD arteries.   No significant reversible ischemia     Cath: 10/14/19   LM       Normal              LAD     20% mid              Cx        Normal              RCA     Normal, nondominant              LVG     Not performed  [employer] were reviewed including labs from other hospital systems through [facility]. Cardiac testing was reviewed including echos, nuclear scans, cardiac catheterization, including from other hospital systems through [facility]. Assessment:      1. FALCON (dyspnea on exertion)    2. Severe sleep apnea    3. Paroxysmal atrial fibrillation (HCC)    4. Benign essential HTN    5. Encounter for vitamin deficiency screening      CHF  SOB symptoms, not new or worsening, oxygen sats drop on occasion [place] at night according to his wife. Mild LE edema that is not new, support hose recommened  ECHO 7/25/2019> EF 55%, grade I DD, mild MR/TR  Taking losartan. Begin HCTZ 25mg qd. CAD  Stable without anginal symptoms  Very mild by cath 10/2019    Hypertension  Stable  cLVH by ECHO  Blood pressure 122/64, pulse 60, height 5' 8\" (1.727 m), weight 232 lb (105.2 kg), SpO2 97 %. Hyperlipidemia  Stable on Pravachol 80mg  1/10/2020> , , HDL 42, LDL 51. TIA/cryptogenic stroke (7/2019 & 10/2019)  F/w neurology  Taking Plavix & asa  Has ILR (10/2019)> No AF, +PVC's. AUDIE  Uses C-pap      Plan:  All cardiac tests and lab results personally reviewed by me during this office visit. 1. Begin HCTZ 25 mg po qd  2. Labs today> BNP, BMP, Vit D  3. Continue exercising  4. RTO 6 weeks  5. ECHO scheduled 7/15/2020.  Scheduled to see CNP EP 9/16/2020.  6. Would recommend eval by pulmonology (wife has seen Dr. Britt Cheng in the past,

## 2020-03-03 ENCOUNTER — OFFICE VISIT (OUTPATIENT)
Dept: PULMONOLOGY | Age: 75
End: 2020-03-03
Payer: MEDICARE

## 2020-03-03 VITALS
DIASTOLIC BLOOD PRESSURE: 78 MMHG | WEIGHT: 229 LBS | OXYGEN SATURATION: 98 % | SYSTOLIC BLOOD PRESSURE: 130 MMHG | HEART RATE: 65 BPM | HEIGHT: 68 IN | BODY MASS INDEX: 34.71 KG/M2

## 2020-03-03 PROBLEM — E66.9 OBESITY (BMI 30-39.9): Chronic | Status: ACTIVE | Noted: 2020-02-12

## 2020-03-03 PROCEDURE — 4040F PNEUMOC VAC/ADMIN/RCVD: CPT | Performed by: INTERNAL MEDICINE

## 2020-03-03 PROCEDURE — 99214 OFFICE O/P EST MOD 30 MIN: CPT | Performed by: INTERNAL MEDICINE

## 2020-03-03 PROCEDURE — G8427 DOCREV CUR MEDS BY ELIG CLIN: HCPCS | Performed by: INTERNAL MEDICINE

## 2020-03-03 PROCEDURE — G8417 CALC BMI ABV UP PARAM F/U: HCPCS | Performed by: INTERNAL MEDICINE

## 2020-03-03 PROCEDURE — G8482 FLU IMMUNIZE ORDER/ADMIN: HCPCS | Performed by: INTERNAL MEDICINE

## 2020-03-03 PROCEDURE — 1123F ACP DISCUSS/DSCN MKR DOCD: CPT | Performed by: INTERNAL MEDICINE

## 2020-03-03 PROCEDURE — 1036F TOBACCO NON-USER: CPT | Performed by: INTERNAL MEDICINE

## 2020-03-03 RX ORDER — CLOPIDOGREL BISULFATE 75 MG/1
75 TABLET ORAL DAILY
COMMUNITY
End: 2020-03-13 | Stop reason: SDUPTHER

## 2020-03-03 ASSESSMENT — SLEEP AND FATIGUE QUESTIONNAIRES
HOW LIKELY ARE YOU TO NOD OFF OR FALL ASLEEP WHILE SITTING AND TALKING TO SOMEONE: 0
HOW LIKELY ARE YOU TO NOD OFF OR FALL ASLEEP WHILE SITTING QUIETLY AFTER LUNCH WITHOUT ALCOHOL: 3
HOW LIKELY ARE YOU TO NOD OFF OR FALL ASLEEP WHEN YOU ARE A PASSENGER IN A CAR FOR AN HOUR WITHOUT A BREAK: 1
HOW LIKELY ARE YOU TO NOD OFF OR FALL ASLEEP WHILE SITTING AND READING: 3
HOW LIKELY ARE YOU TO NOD OFF OR FALL ASLEEP WHILE SITTING INACTIVE IN A PUBLIC PLACE: 1
ESS TOTAL SCORE: 15
HOW LIKELY ARE YOU TO NOD OFF OR FALL ASLEEP WHILE LYING DOWN TO REST IN THE AFTERNOON WHEN CIRCUMSTANCES PERMIT: 3
HOW LIKELY ARE YOU TO NOD OFF OR FALL ASLEEP WHILE WATCHING TV: 3
HOW LIKELY ARE YOU TO NOD OFF OR FALL ASLEEP IN A CAR, WHILE STOPPED FOR A FEW MINUTES IN TRAFFIC: 1

## 2020-03-03 ASSESSMENT — ENCOUNTER SYMPTOMS
PHOTOPHOBIA: 0
CHEST TIGHTNESS: 0
SHORTNESS OF BREATH: 0
EYE PAIN: 0
STRIDOR: 0
SINUS PRESSURE: 0

## 2020-03-03 NOTE — PROGRESS NOTES
Amelia Khalil MD, FAASM, [facility]  Latanya Ashwin Erickson CNP [address]  3rd [facility], [facility] Dwight Tidwell E [phone]   [facility] [address]. [address] [facility]. Arvind Madsen 37 [phone]     [address] [place] [address]  Dept: [phone]  Loc: [phone]    Assessment/Plan:     Severe sleep apnea  New Problem - On Tx. Reviewed sleep study (copy given to pt for their records) and download compliance data with patient. Supplies and parts as needed for his machine. These are medically necessary. Limit caffeine use after 3pm. EPAPmin-10. Will show how to adjust humidity. Mild CAD  Chronic- Stable. Cont meds per PCP and other physicians. Benign essential HTN  Chronic- Stable. Cont meds per PCP and other physicians. Type 2 diabetes mellitus without complication, without long-term current use of insulin (HCC)  Chronic- Stable. Cont meds per PCP and other physicians. Obesity (BMI 30-39. 9)  Chronic-Stable. Encouraged him to work on weight loss through diet and exercise. Diagnoses of Severe sleep apnea, Mild CAD, Benign essential HTN, Type 2 diabetes mellitus without complication, without long-term current use of insulin ([address] and Obesity (BMI 30-39. 9) were pertinent to this visit. The chronic medical conditions listed are directly related to the primary diagnosis listed above. The management of the primary diagnosis affects the secondary diagnosis and vice versa. Subjective:     Patient ID: Maverick Garces is a 66 y.o. male.     Chief Complaint   Patient presents with    Sleep Apnea     Subjective   HPI:    Machine Modem/Download Info:  Compliance (hours/night): 8.1 hrs/night  Download AHI (/hour): 11.8 /HR  Average IPAP Pressure: 17.7 cmH2O  Average EPAP Pressure: 14.6 cmH2O AUTO BIPAP - Settings (Srinivas)  IPAP Max: 25 cmH2O  EPAP Min: 12 cmH2O  Pressure Support Min: 3  Pressure Support Max: 7   Comfort Settings  Humidity Level (0-8): 3  Heated Tubing (Yes/No): Yes  Flex/EPR (0-3): 3 PAP Mask  Mask Type: Full Face mask  Mask Model: F20  Mask Size: L     AUDIE: Polysomnography done on 19 showed CMS AHI 73.1/hr with low sat-74%. CPAP titration done on 19, failed CPAP and was changed to bilevel. Having dryness issues but forgot how to adjust humidity. Pressure feels good, not having SOB nor aerophagia. Wife feels he is sleeping much better and more rested. Not drowsy while driving and not napping as much as before. Coronary artery disease, hypertension, diabetes mellitus, and obesity: stable on meds and followed by pt's PCP and other physicians. [facility]    [place] - Total score: 15    Social History     Socioeconomic History    Marital status:      Spouse name: Quang Valdivia Number of children: 3    Years of education: 12    Highest education level: Not on file   Occupational History    Not on file   Social Needs    Financial resource strain: Not on file    Food insecurity:     Worry: Not on file     Inability: Not on file   [employer] needs:     Medical: Not on file     Non-medical: Not on file   Tobacco Use    Smoking status: Former Smoker     Years: 16.00     Types: Cigarettes     Last attempt to quit: 1977     Years since quittin.8    Smokeless tobacco: Never Used    Tobacco comment:    Substance and Sexual Activity    Alcohol use:  Yes     Alcohol/week: 4.0 standard drinks     Types: 2 Standard drinks or equivalent, 1 Cans of beer, 1 Glasses of wine per week     Comment: ONCE A WEEK    Drug use: Never    Sexual activity: Yes     Partners: Female   Lifestyle    Physical activity:     Days per week: Not on file     Minutes per session: Not on file    Stress: Not on file   Relationships    Social connections:     Talks on phone: Not on file     Gets together: Not on file     Attends Jainism service: Not  Gastroesophageal reflux disease without esophagitis    Diverticulosis    Osteoarthritis    Benign prostatic hyperplasia    Kidney stones    Anemia    Polyp, colonic    Severe sleep apnea    Benign essential HTN    Metabolic syndrome    Dyslipidemia    Bilateral leg edema    RBBB    Insomnia    Type 2 diabetes mellitus without complication, without long-term current use of insulin (HCC)    TIA (transient ischemic attack)    Encounter for electronic analysis of reveal event recorder    Mild CAD    Obesity (BMI 30-39. 9)       Past Medical History:   Diagnosis Date    BPH     Colon polyps     DDD (degenerative disc disease), lumbar     Diverticulosis     Gallstone pancreatitis 09/05/2017    GERD (gastroesophageal reflux disease)     Hyperlipidemia     Hypertension     Impaired fasting blood sugar     Kidney stones 11/10    Lumbar spondylosis     Mild CAD 10/14/2019    [facility]. Heather Bass, [facility]    Mild CAD 11/13/2019    OAB (overactive bladder)     AUDIE (obstructive sleep apnea) 6/4/2013    Osteoarthritis     Osteoarthritis of right knee     Pneumonia     pneumonia    RBBB 12/19/2016    Renal cyst     Retinal vasculitis     Retinal vasculitis     Right hemiparesis ([address] 7/25/2019    Right ureteral stone     Type 2 diabetes mellitus without complication, without long-term current use of insulin (HCC)     diet controlled    Type 2 diabetes mellitus without complication, without long-term current use of insulin ([address]        Past Surgical History:   Procedure Laterality Date    APPENDECTOMY      BONE RESECTION Left     bone spur removal left elbow    CARDIAC CATHETERIZATION  3/23/2012    CATARACT REMOVAL WITH IMPLANT Left 11/01/2017    CATARACT REMOVAL WITH IMPLANT Right 10/2017    CHOLECYSTECTOMY, LAPAROSCOPIC  09/07/2017    COLONOSCOPY  7/2008    multiple    COLONOSCOPY N/A 1/15/2020    COLONOSCOPY POLYPECTOMY SNARE/COLD BIOPSY performed by Cholo Dowling MD at [address] [place] [address]

## 2020-03-03 NOTE — ASSESSMENT & PLAN NOTE
New Problem - On Tx. Reviewed sleep study (copy given to pt for their records) and download compliance data with patient. Supplies and parts as needed for his machine. These are medically necessary. Limit caffeine use after 3pm. EPAPmin-10. Will show how to adjust humidity.

## 2020-03-13 ENCOUNTER — HOSPITAL ENCOUNTER (OUTPATIENT)
Age: 75
Discharge: HOME | End: 2020-03-13
Payer: MEDICARE

## 2020-03-13 ENCOUNTER — OFFICE VISIT (OUTPATIENT)
Dept: CARDIOLOGY CLINIC | Age: 75
End: 2020-03-13
Payer: MEDICARE

## 2020-03-13 VITALS
WEIGHT: 232 LBS | HEIGHT: 68 IN | BODY MASS INDEX: 35.16 KG/M2 | SYSTOLIC BLOOD PRESSURE: 122 MMHG | HEART RATE: 60 BPM | DIASTOLIC BLOOD PRESSURE: 64 MMHG | OXYGEN SATURATION: 97 %

## 2020-03-13 LAB
ANION GAP SERPL CALCULATED.3IONS-SCNC: 12 MMOL/L (ref 3–16)
BUN BLDV-MCNC: 14 MG/DL (ref 7–20)
CALCIUM SERPL-MCNC: 9.9 MG/DL (ref 8.3–10.6)
CHLORIDE BLD-SCNC: 110 MMOL/L (ref 99–110)
CO2: 24 MMOL/L (ref 21–32)
CREAT SERPL-MCNC: 1 MG/DL (ref 0.8–1.3)
GFR AFRICAN AMERICAN: >60
GFR NON-AFRICAN AMERICAN: >60
GLUCOSE BLD-MCNC: 127 MG/DL (ref 70–99)
POTASSIUM SERPL-SCNC: 4.5 MMOL/L (ref 3.5–5.1)
PRO-BNP: 147 PG/ML (ref 0–449)
SODIUM BLD-SCNC: 146 MMOL/L (ref 136–145)
VITAMIN D 25-HYDROXY: 32.9 NG/ML

## 2020-03-13 PROCEDURE — 99204 OFFICE O/P NEW MOD 45 MIN: CPT | Performed by: INTERNAL MEDICINE

## 2020-03-13 PROCEDURE — G8417 CALC BMI ABV UP PARAM F/U: HCPCS | Performed by: INTERNAL MEDICINE

## 2020-03-13 PROCEDURE — 80048 BASIC METABOLIC PNL TOTAL CA: CPT

## 2020-03-13 PROCEDURE — 82306 VITAMIN D 25 HYDROXY: CPT

## 2020-03-13 PROCEDURE — G8482 FLU IMMUNIZE ORDER/ADMIN: HCPCS | Performed by: INTERNAL MEDICINE

## 2020-03-13 PROCEDURE — 83880 ASSAY OF NATRIURETIC PEPTIDE: CPT

## 2020-03-13 PROCEDURE — G8427 DOCREV CUR MEDS BY ELIG CLIN: HCPCS | Performed by: INTERNAL MEDICINE

## 2020-03-13 PROCEDURE — 36415 COLL VENOUS BLD VENIPUNCTURE: CPT

## 2020-03-13 RX ORDER — HYDROCHLOROTHIAZIDE 25 MG/1
25 TABLET ORAL DAILY
Qty: 90 TABLET | Refills: 3 | Status: SHIPPED | OUTPATIENT
Start: 2020-03-13 | End: 2020-10-27

## 2020-03-16 ENCOUNTER — TELEPHONE (OUTPATIENT)
Dept: CARDIOLOGY CLINIC | Age: 75
End: 2020-03-16

## 2020-03-23 ENCOUNTER — NURSE ONLY (OUTPATIENT)
Dept: CARDIOLOGY CLINIC | Age: 75
End: 2020-03-23
Payer: MEDICARE

## 2020-03-23 PROCEDURE — 93298 REM INTERROG DEV EVAL SCRMS: CPT | Performed by: INTERNAL MEDICINE

## 2020-03-23 PROCEDURE — G2066 INTER DEVC REMOTE 30D: HCPCS | Performed by: INTERNAL MEDICINE

## 2020-03-23 NOTE — PROGRESS NOTES
[facility] remote Linq report shows no AF, AF recordings are sinus with ectopy. Noted gallito recordings. We will continue to monitor remotely.

## 2020-03-30 ENCOUNTER — TELEPHONE (OUTPATIENT)
Dept: PULMONOLOGY | Age: 75
End: 2020-03-30

## 2020-04-27 ENCOUNTER — NURSE ONLY (OUTPATIENT)
Dept: CARDIOLOGY CLINIC | Age: 75
End: 2020-04-27
Payer: MEDICARE

## 2020-04-27 PROCEDURE — G2066 INTER DEVC REMOTE 30D: HCPCS | Performed by: INTERNAL MEDICINE

## 2020-04-27 PROCEDURE — 93298 REM INTERROG DEV EVAL SCRMS: CPT | Performed by: INTERNAL MEDICINE

## 2020-04-30 ENCOUNTER — HOSPITAL ENCOUNTER (OUTPATIENT)
Age: 75
Discharge: HOME | End: 2020-04-30
Payer: MEDICARE

## 2020-04-30 ENCOUNTER — OFFICE VISIT (OUTPATIENT)
Dept: CARDIOLOGY CLINIC | Age: 75
End: 2020-04-30
Payer: MEDICARE

## 2020-04-30 VITALS
DIASTOLIC BLOOD PRESSURE: 76 MMHG | BODY MASS INDEX: 34.25 KG/M2 | OXYGEN SATURATION: 96 % | HEIGHT: 68 IN | SYSTOLIC BLOOD PRESSURE: 110 MMHG | WEIGHT: 226 LBS | HEART RATE: 50 BPM

## 2020-04-30 LAB
ANION GAP SERPL CALCULATED.3IONS-SCNC: 16 MMOL/L (ref 3–16)
BUN BLDV-MCNC: 23 MG/DL (ref 7–20)
CALCIUM SERPL-MCNC: 10.4 MG/DL (ref 8.3–10.6)
CHLORIDE BLD-SCNC: 104 MMOL/L (ref 99–110)
CO2: 20 MMOL/L (ref 21–32)
CREAT SERPL-MCNC: 1.3 MG/DL (ref 0.8–1.3)
GFR AFRICAN AMERICAN: >60
GFR NON-AFRICAN AMERICAN: 54
GLUCOSE BLD-MCNC: 109 MG/DL (ref 70–99)
HCT VFR BLD CALC: 45.4 % (ref 40.5–52.5)
HEMOGLOBIN: 15.1 G/DL (ref 13.5–17.5)
MCH RBC QN AUTO: 30.9 PG (ref 26–34)
MCHC RBC AUTO-ENTMCNC: 33.4 G/DL (ref 31–36)
MCV RBC AUTO: 92.8 FL (ref 80–100)
PDW BLD-RTO: 14.2 % (ref 12.4–15.4)
PLATELET # BLD: 193 K/UL (ref 135–450)
PMV BLD AUTO: 10.4 FL (ref 5–10.5)
POTASSIUM SERPL-SCNC: 4.9 MMOL/L (ref 3.5–5.1)
PRO-BNP: 103 PG/ML (ref 0–449)
RBC # BLD: 4.89 M/UL (ref 4.2–5.9)
SODIUM BLD-SCNC: 140 MMOL/L (ref 136–145)
WBC # BLD: 8.9 K/UL (ref 4–11)

## 2020-04-30 PROCEDURE — 99214 OFFICE O/P EST MOD 30 MIN: CPT | Performed by: INTERNAL MEDICINE

## 2020-04-30 PROCEDURE — G8427 DOCREV CUR MEDS BY ELIG CLIN: HCPCS | Performed by: INTERNAL MEDICINE

## 2020-04-30 PROCEDURE — 83880 ASSAY OF NATRIURETIC PEPTIDE: CPT

## 2020-04-30 PROCEDURE — 80048 BASIC METABOLIC PNL TOTAL CA: CPT

## 2020-04-30 PROCEDURE — 36415 COLL VENOUS BLD VENIPUNCTURE: CPT

## 2020-04-30 PROCEDURE — 85027 COMPLETE CBC AUTOMATED: CPT

## 2020-04-30 PROCEDURE — 1036F TOBACCO NON-USER: CPT | Performed by: INTERNAL MEDICINE

## 2020-04-30 PROCEDURE — 4040F PNEUMOC VAC/ADMIN/RCVD: CPT | Performed by: INTERNAL MEDICINE

## 2020-04-30 PROCEDURE — 1123F ACP DISCUSS/DSCN MKR DOCD: CPT | Performed by: INTERNAL MEDICINE

## 2020-04-30 PROCEDURE — G8417 CALC BMI ABV UP PARAM F/U: HCPCS | Performed by: INTERNAL MEDICINE

## 2020-04-30 NOTE — PROGRESS NOTES
Sent to PCP at close of office visit  4. CAD patient on anti-platelet: Yes  5. CAD patient on STATIN therapy:  Yes  6. Patient with CHF and aFib on anticoagulation:  NA        I appreciate the opportunity of cooperating in the care of this patient.     Payam Dewitt M.D., [facility]

## 2020-05-01 ENCOUNTER — TELEPHONE (OUTPATIENT)
Dept: CARDIOLOGY CLINIC | Age: 75
End: 2020-05-01

## 2020-05-01 NOTE — TELEPHONE ENCOUNTER
----- Message from Marie Day MD sent at 5/1/2020  3:51 PM EDT -----  Please call patient and let him know that his labs looked great! Kidneys look a little dry. So I want him to change his HCTZ to just 5 days a week (do not take any on MOnday and Friday). Otherwise, everything is great!   BERNA

## 2020-05-26 NOTE — PROGRESS NOTES
[facility] remote Linq report shows no arrhythmias. AF recording is not real, this shows sinus rhythm with ectopy. We will continue to monitor remotely.
Detail Level: Detailed
Detail Level: Generalized

## 2020-05-28 ENCOUNTER — NURSE ONLY (OUTPATIENT)
Dept: CARDIOLOGY CLINIC | Age: 75
End: 2020-05-28
Payer: MEDICARE

## 2020-05-28 PROCEDURE — G2066 INTER DEVC REMOTE 30D: HCPCS | Performed by: INTERNAL MEDICINE

## 2020-05-28 PROCEDURE — 93298 REM INTERROG DEV EVAL SCRMS: CPT | Performed by: INTERNAL MEDICINE

## 2020-05-28 NOTE — LETTER
[address] [facility] [phone] [address] [phone]    Pacemaker/Defibrillator Clinic          05/26/20        [address] [place] [id number]        Dear Chelo Pete    This letter is to inform you that we received the transmission from your monitor at home that checks your implanted heart device. The next date your monitor will automatically transmit will be 6-29-20. If your report needs attention we will notify you. Your device and monitor are wireless and most transmit cellularly, but please periodically check your monitor is still plugged in to the electrical outlet. If you still use the telephone land line to send please ensure the connection to the phone misty is secure. This will help to ensure successful automatic transmissions in the future. Also, the monitor needs to be close to you while sleeping at night. Please be aware that the remote device transmission sites are periodically monitored only during regular business hours during which simultaneous in-office device clinics are being run. If your transmission requires attention, we will contact you as soon as possible. Thank you.             [address]

## 2020-06-29 ENCOUNTER — NURSE ONLY (OUTPATIENT)
Dept: CARDIOLOGY CLINIC | Age: 75
End: 2020-06-29
Payer: MEDICARE

## 2020-06-29 PROCEDURE — G2066 INTER DEVC REMOTE 30D: HCPCS | Performed by: INTERNAL MEDICINE

## 2020-06-29 PROCEDURE — 93298 REM INTERROG DEV EVAL SCRMS: CPT | Performed by: INTERNAL MEDICINE

## 2020-07-02 ENCOUNTER — APPOINTMENT (OUTPATIENT)
Dept: GENERAL RADIOLOGY | Age: 75
DRG: 309 | End: 2020-07-02
Payer: MEDICARE

## 2020-07-02 ENCOUNTER — HOSPITAL ENCOUNTER (INPATIENT)
Age: 75
LOS: 1 days | Discharge: HOME | DRG: 309 | End: 2020-07-03
Attending: EMERGENCY MEDICINE | Admitting: INTERNAL MEDICINE
Payer: MEDICARE

## 2020-07-02 PROBLEM — R00.1 SYMPTOMATIC BRADYCARDIA: Status: ACTIVE | Noted: 2020-07-02

## 2020-07-02 PROBLEM — N17.9 AKI (ACUTE KIDNEY INJURY) (HCC): Status: ACTIVE | Noted: 2020-07-02

## 2020-07-02 LAB
A/G RATIO: 1.4 (ref 1.1–2.2)
ALBUMIN SERPL-MCNC: 3.8 G/DL (ref 3.4–5)
ALP BLD-CCNC: 84 U/L (ref 40–129)
ALT SERPL-CCNC: 15 U/L (ref 10–40)
ANION GAP SERPL CALCULATED.3IONS-SCNC: 11 MMOL/L (ref 3–16)
AST SERPL-CCNC: 17 U/L (ref 15–37)
BASOPHILS ABSOLUTE: 0 K/UL (ref 0–0.2)
BASOPHILS RELATIVE PERCENT: 0.3 %
BILIRUB SERPL-MCNC: 0.4 MG/DL (ref 0–1)
BUN BLDV-MCNC: 22 MG/DL (ref 7–20)
CALCIUM SERPL-MCNC: 9.5 MG/DL (ref 8.3–10.6)
CHLORIDE BLD-SCNC: 104 MMOL/L (ref 99–110)
CO2: 22 MMOL/L (ref 21–32)
CREAT SERPL-MCNC: 1.8 MG/DL (ref 0.8–1.3)
EOSINOPHILS ABSOLUTE: 0.3 K/UL (ref 0–0.6)
EOSINOPHILS RELATIVE PERCENT: 4 %
GFR AFRICAN AMERICAN: 45
GFR NON-AFRICAN AMERICAN: 37
GLOBULIN: 2.8 G/DL
GLUCOSE BLD-MCNC: 120 MG/DL (ref 70–99)
HCT VFR BLD CALC: 41.1 % (ref 40.5–52.5)
HEMOGLOBIN: 14 G/DL (ref 13.5–17.5)
INR BLD: 0.92 (ref 0.86–1.14)
LYMPHOCYTES ABSOLUTE: 2.1 K/UL (ref 1–5.1)
LYMPHOCYTES RELATIVE PERCENT: 23.9 %
MAGNESIUM: 2.1 MG/DL (ref 1.8–2.4)
MCH RBC QN AUTO: 31.8 PG (ref 26–34)
MCHC RBC AUTO-ENTMCNC: 34 G/DL (ref 31–36)
MCV RBC AUTO: 93.4 FL (ref 80–100)
MONOCYTES ABSOLUTE: 0.7 K/UL (ref 0–1.3)
MONOCYTES RELATIVE PERCENT: 8.3 %
NEUTROPHILS ABSOLUTE: 5.5 K/UL (ref 1.7–7.7)
NEUTROPHILS RELATIVE PERCENT: 63.5 %
PDW BLD-RTO: 14 % (ref 12.4–15.4)
PLATELET # BLD: 196 K/UL (ref 135–450)
PMV BLD AUTO: 10.3 FL (ref 5–10.5)
POTASSIUM REFLEX MAGNESIUM: 3.7 MMOL/L (ref 3.5–5.1)
PROTHROMBIN TIME: 10.7 SEC (ref 10–13.2)
RBC # BLD: 4.4 M/UL (ref 4.2–5.9)
SODIUM BLD-SCNC: 137 MMOL/L (ref 136–145)
TOTAL PROTEIN: 6.6 G/DL (ref 6.4–8.2)
TROPONIN: <0.01 NG/ML
WBC # BLD: 8.7 K/UL (ref 4–11)

## 2020-07-02 PROCEDURE — 99285 EMERGENCY DEPT VISIT HI MDM: CPT

## 2020-07-02 PROCEDURE — 83735 ASSAY OF MAGNESIUM: CPT

## 2020-07-02 PROCEDURE — 93005 ELECTROCARDIOGRAM TRACING: CPT | Performed by: EMERGENCY MEDICINE

## 2020-07-02 PROCEDURE — 71045 X-RAY EXAM CHEST 1 VIEW: CPT

## 2020-07-02 PROCEDURE — 2580000003 HC RX 258: Performed by: EMERGENCY MEDICINE

## 2020-07-02 PROCEDURE — 1200000000 HC SEMI PRIVATE

## 2020-07-02 PROCEDURE — 96365 THER/PROPH/DIAG IV INF INIT: CPT

## 2020-07-02 PROCEDURE — 80053 COMPREHEN METABOLIC PANEL: CPT

## 2020-07-02 PROCEDURE — 85025 COMPLETE CBC W/AUTO DIFF WBC: CPT

## 2020-07-02 PROCEDURE — 85610 PROTHROMBIN TIME: CPT

## 2020-07-02 PROCEDURE — 6360000002 HC RX W HCPCS: Performed by: EMERGENCY MEDICINE

## 2020-07-02 PROCEDURE — 84484 ASSAY OF TROPONIN QUANT: CPT

## 2020-07-02 RX ORDER — 0.9 % SODIUM CHLORIDE 0.9 %
500 INTRAVENOUS SOLUTION INTRAVENOUS ONCE
Status: COMPLETED | OUTPATIENT
Start: 2020-07-02 | End: 2020-07-03

## 2020-07-02 RX ORDER — MAGNESIUM SULFATE IN WATER 40 MG/ML
2 INJECTION, SOLUTION INTRAVENOUS ONCE
Status: COMPLETED | OUTPATIENT
Start: 2020-07-02 | End: 2020-07-03

## 2020-07-02 RX ADMIN — MAGNESIUM SULFATE HEPTAHYDRATE 2 G: 40 INJECTION, SOLUTION INTRAVENOUS at 22:01

## 2020-07-02 RX ADMIN — SODIUM CHLORIDE 500 ML: 9 INJECTION, SOLUTION INTRAVENOUS at 23:28

## 2020-07-03 VITALS
BODY MASS INDEX: 35.72 KG/M2 | SYSTOLIC BLOOD PRESSURE: 136 MMHG | OXYGEN SATURATION: 94 % | TEMPERATURE: 97.8 F | HEART RATE: 63 BPM | WEIGHT: 235.67 LBS | RESPIRATION RATE: 18 BRPM | HEIGHT: 68 IN | DIASTOLIC BLOOD PRESSURE: 75 MMHG

## 2020-07-03 LAB
A/G RATIO: 1.2 (ref 1.1–2.2)
ALBUMIN SERPL-MCNC: 3.4 G/DL (ref 3.4–5)
ALP BLD-CCNC: 83 U/L (ref 40–129)
ALT SERPL-CCNC: 14 U/L (ref 10–40)
ANION GAP SERPL CALCULATED.3IONS-SCNC: 11 MMOL/L (ref 3–16)
AST SERPL-CCNC: 17 U/L (ref 15–37)
BASOPHILS ABSOLUTE: 0 K/UL (ref 0–0.2)
BASOPHILS RELATIVE PERCENT: 0.7 %
BILIRUB SERPL-MCNC: 0.3 MG/DL (ref 0–1)
BUN BLDV-MCNC: 19 MG/DL (ref 7–20)
CALCIUM SERPL-MCNC: 8.9 MG/DL (ref 8.3–10.6)
CHLORIDE BLD-SCNC: 110 MMOL/L (ref 99–110)
CHOLESTEROL, TOTAL: 112 MG/DL (ref 0–199)
CO2: 21 MMOL/L (ref 21–32)
CREAT SERPL-MCNC: 1.5 MG/DL (ref 0.8–1.3)
D DIMER: <200 NG/ML DDU (ref 0–229)
EKG ATRIAL RATE: 68 BPM
EKG ATRIAL RATE: 77 BPM
EKG DIAGNOSIS: NORMAL
EKG DIAGNOSIS: NORMAL
EKG P AXIS: 0 DEGREES
EKG P AXIS: 11 DEGREES
EKG P-R INTERVAL: 182 MS
EKG P-R INTERVAL: 204 MS
EKG Q-T INTERVAL: 428 MS
EKG Q-T INTERVAL: 492 MS
EKG QRS DURATION: 146 MS
EKG QRS DURATION: 146 MS
EKG QTC CALCULATION (BAZETT): 484 MS
EKG QTC CALCULATION (BAZETT): 523 MS
EKG R AXIS: -46 DEGREES
EKG R AXIS: -46 DEGREES
EKG T AXIS: -26 DEGREES
EKG T AXIS: 8 DEGREES
EKG VENTRICULAR RATE: 68 BPM
EKG VENTRICULAR RATE: 77 BPM
EOSINOPHILS ABSOLUTE: 0.4 K/UL (ref 0–0.6)
EOSINOPHILS RELATIVE PERCENT: 5.1 %
GFR AFRICAN AMERICAN: 55
GFR NON-AFRICAN AMERICAN: 45
GLOBULIN: 2.8 G/DL
GLUCOSE BLD-MCNC: 140 MG/DL (ref 70–99)
GLUCOSE BLD-MCNC: 149 MG/DL (ref 70–99)
GLUCOSE BLD-MCNC: 154 MG/DL (ref 70–99)
HCT VFR BLD CALC: 40.4 % (ref 40.5–52.5)
HDLC SERPL-MCNC: 38 MG/DL (ref 40–60)
HEMOGLOBIN: 13.7 G/DL (ref 13.5–17.5)
LDL CHOLESTEROL CALCULATED: 39 MG/DL
LYMPHOCYTES ABSOLUTE: 2 K/UL (ref 1–5.1)
LYMPHOCYTES RELATIVE PERCENT: 27.6 %
MCH RBC QN AUTO: 32 PG (ref 26–34)
MCHC RBC AUTO-ENTMCNC: 33.9 G/DL (ref 31–36)
MCV RBC AUTO: 94.2 FL (ref 80–100)
MONOCYTES ABSOLUTE: 0.6 K/UL (ref 0–1.3)
MONOCYTES RELATIVE PERCENT: 7.9 %
NEUTROPHILS ABSOLUTE: 4.4 K/UL (ref 1.7–7.7)
NEUTROPHILS RELATIVE PERCENT: 58.7 %
PDW BLD-RTO: 14.2 % (ref 12.4–15.4)
PERFORMED ON: ABNORMAL
PERFORMED ON: ABNORMAL
PLATELET # BLD: 191 K/UL (ref 135–450)
PMV BLD AUTO: 9.9 FL (ref 5–10.5)
POTASSIUM REFLEX MAGNESIUM: 4 MMOL/L (ref 3.5–5.1)
RBC # BLD: 4.28 M/UL (ref 4.2–5.9)
SODIUM BLD-SCNC: 142 MMOL/L (ref 136–145)
TOTAL PROTEIN: 6.2 G/DL (ref 6.4–8.2)
TRIGL SERPL-MCNC: 173 MG/DL (ref 0–150)
TROPONIN: <0.01 NG/ML
TROPONIN: <0.01 NG/ML
VLDLC SERPL CALC-MCNC: 35 MG/DL
WBC # BLD: 7.4 K/UL (ref 4–11)

## 2020-07-03 PROCEDURE — 85379 FIBRIN DEGRADATION QUANT: CPT

## 2020-07-03 PROCEDURE — 80053 COMPREHEN METABOLIC PANEL: CPT

## 2020-07-03 PROCEDURE — 36415 COLL VENOUS BLD VENIPUNCTURE: CPT

## 2020-07-03 PROCEDURE — 99222 1ST HOSP IP/OBS MODERATE 55: CPT | Performed by: INTERNAL MEDICINE

## 2020-07-03 PROCEDURE — 93010 ELECTROCARDIOGRAM REPORT: CPT | Performed by: INTERNAL MEDICINE

## 2020-07-03 PROCEDURE — 6360000002 HC RX W HCPCS: Performed by: INTERNAL MEDICINE

## 2020-07-03 PROCEDURE — 85025 COMPLETE CBC W/AUTO DIFF WBC: CPT

## 2020-07-03 PROCEDURE — 93005 ELECTROCARDIOGRAM TRACING: CPT | Performed by: INTERNAL MEDICINE

## 2020-07-03 PROCEDURE — 80061 LIPID PANEL: CPT

## 2020-07-03 PROCEDURE — 6370000000 HC RX 637 (ALT 250 FOR IP): Performed by: INTERNAL MEDICINE

## 2020-07-03 PROCEDURE — 84484 ASSAY OF TROPONIN QUANT: CPT

## 2020-07-03 PROCEDURE — 2580000003 HC RX 258: Performed by: INTERNAL MEDICINE

## 2020-07-03 RX ORDER — TAMSULOSIN HYDROCHLORIDE 0.4 MG/1
0.4 CAPSULE ORAL DAILY
Status: DISCONTINUED | OUTPATIENT
Start: 2020-07-03 | End: 2020-07-03 | Stop reason: HOSPADM

## 2020-07-03 RX ORDER — ONDANSETRON 2 MG/ML
4 INJECTION INTRAMUSCULAR; INTRAVENOUS EVERY 6 HOURS PRN
Status: DISCONTINUED | OUTPATIENT
Start: 2020-07-03 | End: 2020-07-03 | Stop reason: HOSPADM

## 2020-07-03 RX ORDER — ACETAMINOPHEN 650 MG/1
650 SUPPOSITORY RECTAL EVERY 6 HOURS PRN
Status: DISCONTINUED | OUTPATIENT
Start: 2020-07-03 | End: 2020-07-03 | Stop reason: HOSPADM

## 2020-07-03 RX ORDER — PRAVASTATIN SODIUM 40 MG
80 TABLET ORAL EVERY EVENING
Status: DISCONTINUED | OUTPATIENT
Start: 2020-07-03 | End: 2020-07-03 | Stop reason: ALTCHOICE

## 2020-07-03 RX ORDER — 0.9 % SODIUM CHLORIDE 0.9 %
500 INTRAVENOUS SOLUTION INTRAVENOUS PRN
Status: DISCONTINUED | OUTPATIENT
Start: 2020-07-03 | End: 2020-07-03 | Stop reason: HOSPADM

## 2020-07-03 RX ORDER — HYDRALAZINE HYDROCHLORIDE 20 MG/ML
10 INJECTION INTRAMUSCULAR; INTRAVENOUS EVERY 6 HOURS PRN
Status: DISCONTINUED | OUTPATIENT
Start: 2020-07-03 | End: 2020-07-03 | Stop reason: HOSPADM

## 2020-07-03 RX ORDER — PROMETHAZINE HYDROCHLORIDE 25 MG/1
12.5 TABLET ORAL EVERY 6 HOURS PRN
Status: DISCONTINUED | OUTPATIENT
Start: 2020-07-03 | End: 2020-07-03 | Stop reason: HOSPADM

## 2020-07-03 RX ORDER — PANTOPRAZOLE SODIUM 40 MG/1
40 TABLET, DELAYED RELEASE ORAL
Status: DISCONTINUED | OUTPATIENT
Start: 2020-07-03 | End: 2020-07-03 | Stop reason: HOSPADM

## 2020-07-03 RX ORDER — CLOPIDOGREL BISULFATE 75 MG/1
75 TABLET ORAL DAILY
Status: DISCONTINUED | OUTPATIENT
Start: 2020-07-03 | End: 2020-07-03 | Stop reason: HOSPADM

## 2020-07-03 RX ORDER — POTASSIUM CHLORIDE 7.45 MG/ML
10 INJECTION INTRAVENOUS PRN
Status: DISCONTINUED | OUTPATIENT
Start: 2020-07-03 | End: 2020-07-03 | Stop reason: SDUPTHER

## 2020-07-03 RX ORDER — ACETAMINOPHEN 325 MG/1
650 TABLET ORAL EVERY 6 HOURS PRN
Status: DISCONTINUED | OUTPATIENT
Start: 2020-07-03 | End: 2020-07-03 | Stop reason: HOSPADM

## 2020-07-03 RX ORDER — ATORVASTATIN CALCIUM 40 MG/1
40 TABLET, FILM COATED ORAL NIGHTLY
Status: DISCONTINUED | OUTPATIENT
Start: 2020-07-03 | End: 2020-07-03 | Stop reason: HOSPADM

## 2020-07-03 RX ORDER — SODIUM CHLORIDE 0.9 % (FLUSH) 0.9 %
10 SYRINGE (ML) INJECTION PRN
Status: DISCONTINUED | OUTPATIENT
Start: 2020-07-03 | End: 2020-07-03 | Stop reason: HOSPADM

## 2020-07-03 RX ORDER — POTASSIUM CHLORIDE 7.45 MG/ML
10 INJECTION INTRAVENOUS PRN
Status: DISCONTINUED | OUTPATIENT
Start: 2020-07-03 | End: 2020-07-03 | Stop reason: HOSPADM

## 2020-07-03 RX ORDER — VITAMIN B COMPLEX
2000 TABLET ORAL DAILY
Status: DISCONTINUED | OUTPATIENT
Start: 2020-07-03 | End: 2020-07-03 | Stop reason: HOSPADM

## 2020-07-03 RX ORDER — SODIUM CHLORIDE 9 MG/ML
INJECTION, SOLUTION INTRAVENOUS CONTINUOUS
Status: DISCONTINUED | OUTPATIENT
Start: 2020-07-03 | End: 2020-07-03 | Stop reason: HOSPADM

## 2020-07-03 RX ORDER — OMEPRAZOLE 20 MG/1
40 CAPSULE, DELAYED RELEASE ORAL DAILY
Status: DISCONTINUED | OUTPATIENT
Start: 2020-07-03 | End: 2020-07-03 | Stop reason: CLARIF

## 2020-07-03 RX ORDER — NITROGLYCERIN 0.4 MG/1
0.4 TABLET SUBLINGUAL EVERY 5 MIN PRN
Status: DISCONTINUED | OUTPATIENT
Start: 2020-07-03 | End: 2020-07-03 | Stop reason: HOSPADM

## 2020-07-03 RX ORDER — POTASSIUM CHLORIDE 20 MEQ/1
40 TABLET, EXTENDED RELEASE ORAL PRN
Status: DISCONTINUED | OUTPATIENT
Start: 2020-07-03 | End: 2020-07-03 | Stop reason: SDUPTHER

## 2020-07-03 RX ORDER — FLUTICASONE PROPIONATE 50 MCG
2 SPRAY, SUSPENSION (ML) NASAL DAILY
Status: DISCONTINUED | OUTPATIENT
Start: 2020-07-03 | End: 2020-07-03 | Stop reason: HOSPADM

## 2020-07-03 RX ORDER — EZETIMIBE 10 MG/1
10 TABLET ORAL DAILY
Status: DISCONTINUED | OUTPATIENT
Start: 2020-07-03 | End: 2020-07-03 | Stop reason: RX

## 2020-07-03 RX ORDER — AMLODIPINE BESYLATE 5 MG/1
5 TABLET ORAL DAILY
Status: DISCONTINUED | OUTPATIENT
Start: 2020-07-03 | End: 2020-07-03 | Stop reason: HOSPADM

## 2020-07-03 RX ORDER — TROSPIUM CHLORIDE 20 MG/1
20 TABLET, FILM COATED ORAL
Status: DISCONTINUED | OUTPATIENT
Start: 2020-07-03 | End: 2020-07-03 | Stop reason: HOSPADM

## 2020-07-03 RX ORDER — LOSARTAN POTASSIUM 100 MG/1
100 TABLET ORAL DAILY
Status: DISCONTINUED | OUTPATIENT
Start: 2020-07-03 | End: 2020-07-03 | Stop reason: HOSPADM

## 2020-07-03 RX ORDER — SODIUM CHLORIDE 0.9 % (FLUSH) 0.9 %
10 SYRINGE (ML) INJECTION EVERY 12 HOURS SCHEDULED
Status: DISCONTINUED | OUTPATIENT
Start: 2020-07-03 | End: 2020-07-03 | Stop reason: HOSPADM

## 2020-07-03 RX ORDER — MOMETASONE FUROATE 50 UG/1
2 SPRAY, METERED NASAL 2 TIMES DAILY
Status: DISCONTINUED | OUTPATIENT
Start: 2020-07-03 | End: 2020-07-03 | Stop reason: CLARIF

## 2020-07-03 RX ORDER — SOLIFENACIN SUCCINATE 10 MG/1
10 TABLET, FILM COATED ORAL DAILY
Status: DISCONTINUED | OUTPATIENT
Start: 2020-07-03 | End: 2020-07-03 | Stop reason: CLARIF

## 2020-07-03 RX ORDER — POTASSIUM CHLORIDE 20 MEQ/1
40 TABLET, EXTENDED RELEASE ORAL PRN
Status: DISCONTINUED | OUTPATIENT
Start: 2020-07-03 | End: 2020-07-03 | Stop reason: HOSPADM

## 2020-07-03 RX ADMIN — AMLODIPINE BESYLATE 5 MG: 5 TABLET ORAL at 10:22

## 2020-07-03 RX ADMIN — Medication 10 ML: at 10:23

## 2020-07-03 RX ADMIN — Medication 325 MG: at 10:23

## 2020-07-03 RX ADMIN — SODIUM CHLORIDE: 9 INJECTION, SOLUTION INTRAVENOUS at 02:50

## 2020-07-03 RX ADMIN — FLUTICASONE PROPIONATE 2 SPRAY: 50 SPRAY, METERED NASAL at 12:06

## 2020-07-03 RX ADMIN — CLOPIDOGREL 75 MG: 75 TABLET, FILM COATED ORAL at 10:23

## 2020-07-03 RX ADMIN — VITAMIN D, TAB 1000IU (100/BT) 2000 UNITS: 25 TAB at 10:23

## 2020-07-03 RX ADMIN — ENOXAPARIN SODIUM 40 MG: 40 INJECTION SUBCUTANEOUS at 10:23

## 2020-07-03 RX ADMIN — LOSARTAN POTASSIUM 100 MG: 100 TABLET, FILM COATED ORAL at 10:22

## 2020-07-03 RX ADMIN — TAMSULOSIN HYDROCHLORIDE 0.4 MG: 0.4 CAPSULE ORAL at 10:23

## 2020-07-03 ASSESSMENT — PAIN SCALES - GENERAL
PAINLEVEL_OUTOF10: 0

## 2020-07-03 ASSESSMENT — ENCOUNTER SYMPTOMS
FACIAL SWELLING: 0
NAUSEA: 0
STRIDOR: 0
CHOKING: 0
EYE REDNESS: 0
EYE PAIN: 0
VOMITING: 0

## 2020-07-03 NOTE — PROGRESS NOTES
Data- discharge order received, pt verbalized agreement to discharge, disposition to previous residence, no needs for HHC/DME. Action- discharge instructions prepared and given to pt, pt verbalized understanding. Medication information packet given r/t NEW and/or CHANGED prescriptions emphasizing name/purpose/side effects, pt verbalized understanding. Discharge instruction summary: Diet- cardiac, Activity- as nel, Primary Care Physician as followsKenzie Robb -[phone] f/u appointment, immunizations reviewed, prescription medications filled none. Response- Pt belongings gathered, IV removed. Disposition is home (no HHC/DME needs), transported with spouse, taken to lobby via w/c w/ this nurse, no complications.

## 2020-07-03 NOTE — ED NOTES
Pt report given to RN on 3A, nurse accepted patient, no questions.       Malu Chatterjee RN  07/03/20 1055

## 2020-07-03 NOTE — ED PROVIDER NOTES
eMERGENCY dEPARTMENT eNCOUnter      PtName: Katelyn Vargas  MRN: [id number]  [place] 1945  Date of evaluation: 7/2/2020  Provider: Rebecca Verma DO     CHIEF COMPLAINT       Chief Complaint   Patient presents with    Bradycardia     pts wife reporting that he has a loop recorder and was told that she neweded to be monitoring his bp and heart rate and SPO2 bc of sleep apnea, wife reporting that he wasnt feeling quite right so since 630pm tonight he was having low heart rate in the 30's          HISTORY OF PRESENT ILLNESS   (Location/Symptom, Timing/Onset,Context/Setting, Quality, Duration, Modifying Factors, Severity) Note limiting factors. HPI    Katelyn Vargas is a 66 y.o. male who presents to the emergency department with chief complaint of generalized weakness. Started today. He denies any chest pain, shortness of breath or palpitations. No fever chills or body aches. No nausea vomiting or diarrhea. He has a loop recorder in place. Last July he had a TIA, he had an internal loop recorder insertion for cryptogenic stroke. Left heart cath at the same time back in October 2019 showed very mild CAD. Previous visit back in February showed a sinus rhythm with frequent PVCs. PVC burden on MCOT was 25%, PAC burden was 3%. Low heart rate 42, high heart rate 120. Today when he felt weak his wife checked pulse oxygenation and heart rate was 37 bpm.  Therefore they came and given the fact that he was newly weak. Nursing Notes were reviewed. REVIEW OF SYSTEMS    (2+ forlevel 4; 10+ for level 5)     Review of Systems  See hpi for further details. Complete 10 point review of all systems performed and is otherwise negative unless noted above.     PAST MEDICAL HISTORY     Past Medical History:   Diagnosis Date    BPH     Colon polyps     DDD (degenerative disc disease), lumbar     Diverticulosis     Gallstone pancreatitis 09/05/2017    GERD (gastroesophageal reflux disease)     Hyperlipidemia  Hypertension     Impaired fasting blood sugar     Kidney stones 11/10    Lumbar spondylosis     Mild CAD 10/14/2019    Dr. Yuko Dumont, [place] Mild CAD 11/13/2019    OAB (overactive bladder)     AUDIE (obstructive sleep apnea) 6/4/2013    Osteoarthritis     Osteoarthritis of right knee     Pneumonia     pneumonia    RBBB 12/19/2016    Renal cyst     Retinal vasculitis     Retinal vasculitis     Right hemiparesis ([facility] 7/25/2019    Right ureteral stone     Type 2 diabetes mellitus without complication, without long-term current use of insulin ([facility])     diet controlled    Type 2 diabetes mellitus without complication, without long-term current use of insulin ([facility] [address]        SURGICAL HISTORY       Past Surgical History:   Procedure Laterality Date    APPENDECTOMY      BONE RESECTION Left     bone spur removal left elbow    CARDIAC CATHETERIZATION  3/23/2012    CATARACT REMOVAL WITH IMPLANT Left 11/01/2017    CATARACT REMOVAL WITH IMPLANT Right 10/2017    CHOLECYSTECTOMY, LAPAROSCOPIC  09/07/2017    COLONOSCOPY  7/2008    multiple    COLONOSCOPY N/A 1/15/2020    COLONOSCOPY POLYPECTOMY SNARE/COLD BIOPSY performed by Rangel Disla MD at [facility]  12/2/2014    Retrograde Pyelogram, stent, Dr. Mead Certain N/A 12/13/2018    CYSTOSCOPY, LEFT RETROGRADE PYELOGRAM, LEFT STENT PLACEMENT performed by Angy Humphrey MD at [facility] 1/2/2019    CYSTOSCOPY LEFT URETEROSCOPY HOLMIUM LASER LITHOTRIPSY, STONE MANIPULATION WITH LEFT STENT EXCHANGE performed by Angy Humphrey MD at [place] [address]  3/28/2012    laparoscopic    TONSILLECTOMY         CURRENT MEDICATIONS       Current Discharge Medication List      CONTINUE these medications which have NOT CHANGED    Details   hydrochlorothiazide (HYDRODIURIL) 25 MG tablet Take 1 tablet by mouth daily  Qty: 90 tablet, Refills: 3      clopidogrel (PLAVIX) 75 MG tablet Take 75 mg by mouth daily    Associated Diagnoses: Mild CAD      mometasone (NASONEX) 50 MCG/ACT nasal spray 2 sprays by Each Nostril route daily  Qty: 3 Inhaler, Refills: 3      pravastatin (PRAVACHOL) 80 MG tablet Take 1 tablet by mouth every evening  Qty: 90 tablet, Refills: 3    Associated Diagnoses: Dyslipidemia      ezetimibe (ZETIA) 10 MG tablet Take 1 tablet by mouth daily  Qty: 90 tablet, Refills: 3    Associated Diagnoses: Dyslipidemia      amLODIPine (NORVASC) 5 MG tablet Take 1 tablet by mouth daily  Qty: 90 tablet, Refills: 3    Associated Diagnoses: Benign essential HTN      losartan (COZAAR) 100 MG tablet Take 1 tablet by mouth daily  Qty: 90 tablet, Refills: 3    Associated Diagnoses: Benign essential HTN      b complex vitamins capsule Take 1 capsule by mouth daily      omeprazole (PRILOSEC) 40 MG delayed release capsule Take 1 capsule by mouth daily  Qty: 90 capsule, Refills: 3      solifenacin (VESICARE) 10 MG tablet Take 10 mg by mouth daily       tamsulosin (FLOMAX) 0.4 MG capsule Take 1 capsule by mouth daily  Qty: 90 capsule, Refills: 3    Associated Diagnoses: Benign prostatic hyperplasia, unspecified whether lower urinary tract symptoms present      aspirin 81 MG tablet Take 81 mg by mouth daily      Cholecalciferol (VITAMIN D) 2000 UNITS CAPS capsule Take 1 capsule by mouth daily       Glucosamine HCl-MSM (GLUCOSAMINE-MSM PO) Take 1 tablet by mouth 2 times daily              ALLERGIES     Patient has no known allergies.     FAMILY HISTORY       Family History   Problem Relation Age of Onset    Mental Illness Mother         Alzheimers    Cancer Father         Leukemia    Diabetes Brother     Sleep Apnea Brother     Other Sister         pyloric valve repacement          SOCIAL HISTORY       Social History     Socioeconomic History    Marital status:      Spouse name: Kaelyn Moss Number of children: 3    Years of education: 12    Highest education level: None Occupational History    None   Social Needs    Financial resource strain: None    Food insecurity     Worry: None     Inability: None    Transportation needs     Medical: None     Non-medical: None   Tobacco Use    Smoking status: Former Smoker     Years: 16.00     Types: Cigarettes     Last attempt to quit: 1977     Years since quittin.1    Smokeless tobacco: Never Used    Tobacco comment:    Substance and Sexual Activity    Alcohol use: Yes     Alcohol/week: 4.0 standard drinks     Types: 1 Glasses of wine, 1 Cans of beer, 2 Standard drinks or equivalent per week     Comment: ONCE A WEEK    Drug use: Never    Sexual activity: Yes     Partners: Female   Lifestyle    Physical activity     Days per week: None     Minutes per session: None    Stress: None   Relationships    Social connections     Talks on phone: None     Gets together: None     Attends [employer]al service: None     Active member of club or organization: None     Attends meetings of clubs or organizations: None     Relationship status: None    Intimate partner violence     Fear of current or ex partner: None     Emotionally abused: None     Physically abused: None     Forced sexual activity: None   Other Topics Concern    None   Social History Narrative    None       SCREENINGS    [place] Coma Scale  Eye Opening: Spontaneous  Best Verbal Response: Oriented  Best Motor Response: Obeys commands  [place] Coma Scale Score: 15      PHYSICAL EXAM    (5+ for level 4, 8+ for level 5)     ED Triage Vitals [20 2113]   BP Temp Temp src Pulse Resp SpO2 Height Weight   126/62 98.1 °F (36.7 °C) -- (!) 37 15 97 % -- --       Physical Exam  Vitals signs and nursing note reviewed. Constitutional:       General: He is not in acute distress. Appearance: Normal appearance. He is well-developed. He is not ill-appearing, toxic-appearing or diaphoretic. HENT:      Head: Normocephalic and atraumatic.       Right Ear: External ear normal.      Left Ear: External ear normal.      Nose: Nose normal.      Mouth/Throat:      Mouth: Mucous membranes are moist.      Pharynx: Oropharynx is clear. Eyes:      General: No scleral icterus. Right eye: No discharge. Left eye: No discharge. Conjunctiva/sclera: Conjunctivae normal.      Pupils: Pupils are equal, round, and reactive to light. Neck:      Musculoskeletal: Normal range of motion and neck supple. Vascular: No JVD. Trachea: No tracheal deviation. Cardiovascular:      Pulses: Normal pulses. Comments: Patient with bigeminy on the monitor. Pulse is 40 on palpation. Pulmonary:      Effort: Pulmonary effort is normal. No respiratory distress. Breath sounds: No stridor. Abdominal:      General: Abdomen is flat. There is no distension. Palpations: Abdomen is soft. Tenderness: There is no abdominal tenderness. Musculoskeletal: Normal range of motion. General: No swelling, tenderness, deformity or signs of injury. Right lower leg: No edema. Left lower leg: No edema. Skin:     General: Skin is warm and dry. Capillary Refill: Capillary refill takes less than 2 seconds. Coloration: Skin is not jaundiced or pale. Findings: No bruising, erythema, lesion or rash. Neurological:      General: No focal deficit present. Mental Status: He is alert and oriented to person, place, and time. Cranial Nerves: No cranial nerve deficit. Sensory: No sensory deficit. Motor: No weakness or abnormal muscle tone. Coordination: Coordination normal.   Psychiatric:         Mood and Affect: Mood normal.         Behavior: Behavior normal.         Thought Content:  Thought content normal.         Judgment: Judgment normal.         DIAGNOSTIC RESULTS     EKG (Per Emergency Physician):   EKG interpretation by ED physician: Left axis deviation, what appears to be bigeminy at 77 bpm.  No ischemic ST changes appreciated. Right bundle branch block present. RADIOLOGY (Per Emergency Physician): Interpretation per the Radiologist below, if available at the time of this note:  Xr Chest Portable    Result Date: 7/2/2020  EXAMINATION: ONE XRAY VIEW OF THE CHEST 7/2/2020 6:29 pm COMPARISON: 07/25/2019 HISTORY: ORDERING SYSTEM PROVIDED HISTORY: CP TECHNOLOGIST PROVIDED HISTORY: Reason for exam:->CP Reason for Exam: Bradycardia Acuity: Acute Type of Exam: Initial FINDINGS: The cardial-pericardial silhouette is unremarkable in appearance. The lungs are clear. No pneumothorax is found. No free air is seen. No acute bony abnormality. Unremarkable portable chest radiograph.        LABS:  Labs Reviewed   COMPREHENSIVE METABOLIC PANEL W/ REFLEX TO MG FOR LOW K - Abnormal; Notable for the following components:       Result Value    Glucose 120 (*)     BUN 22 (*)     CREATININE 1.8 (*)     GFR Non- 37 (*)     GFR  45 (*)     All other components within normal limits    Narrative:     Performed at:  [facility] [address], [email or web address]   Phone [phone]   CBC WITH AUTO DIFFERENTIAL    Narrative:     Performed at:  [facility] [address], [email or web address]   Phone [phone]   TROPONIN    Narrative:     Performed at:  [facility] [address], [email or web address]   Phone [phone]   PROTIME-INR    Narrative:     Performed at:  [facility] [address], [email or web address]   Phone [phone]   MAGNESIUM    Narrative:     Performed at:  [facility] [address], [email or web address]   Phone [phone]   COMPREHENSIVE METABOLIC PANEL W/ REFLEX TO MG FOR LOW K   TROPONIN   TROPONIN   D-DIMER, QUANTITATIVE   LIPID PANEL   CBC WITH AUTO DIFFERENTIAL   BASIC METABOLIC PANEL       All other (NITROSTAT) SL tablet 0.4 mg (has no administration in time range)   0.9 % sodium chloride infusion (has no administration in time range)   0.9 % sodium chloride bolus (has no administration in time range)   hydrALAZINE (APRESOLINE) injection 10 mg (has no administration in time range)   pantoprazole (PROTONIX) tablet 40 mg (has no administration in time range)   trospium (SANCTURA) tablet 20 mg (has no administration in time range)   fluticasone (FLONASE) 50 MCG/ACT nasal spray 2 spray (has no administration in time range)   magnesium sulfate 2 g in 50 mL IVPB premix (0 g Intravenous Stopped 7/3/20 0004)   0.9 % sodium chloride bolus (0 mLs Intravenous Stopped 7/3/20 0039)       MDM. Cardiac work-up initiated. IV magnesium ordered. Mag level also ordered. Consult cardiology was also recommended. Patient emergently placed on pacer pads at bedside. However atropine/patient not currently indicated as he is currently stable bradycardia. It appears that his perfusing rhythm is bradycardic. Patient was reassessed and does not appear to be in bigeminy. However is having frequent PVCs. He does have acute kidney failure so IV fluids were ordered. Discussed case with Raza Malagon from cardiology who agrees with current management. Cardiology to see in the morning. Discussed case with Dr. Madai Grey for admission. CRITICAL CARE TIME: 30 minutes excluding billable procedure time: Initiation of pacer pads and patient with possible bradycardia, complex medical decision making, consultation emergently with cardiology, potential for deterioration. CONSULTS:  IP CONSULT TO CARDIOLOGY  IP CONSULT TO CARDIOLOGY    PROCEDURES:  Unless otherwise noted below, none     Procedures    FINAL IMPRESSION      1. Bigeminy    2. Generalized weakness    3.  SANDRO (acute kidney injury) [facility])          DISPOSITION/PLAN   DISPOSITION Admitted 07/02/2020 11:11:30 PM      PATIENT REFERRED TO:  Brandan Esparza DO  [address] [facility] [address] [place] [address] [facility]  [phone]            DISCHARGE MEDICATIONS:  Current Discharge Medication List             (Please note:  Portions of this note were completed with a voice recognition program. Efforts were made to edit the dictations but occasionally words and phrases are mis-transcribed.)    Form v2016. J.[id number]    Reshma Perez DO (electronically signed)  Emergency Medicine Provider              Stephanie Miller DO  07/03/20 0234

## 2020-07-03 NOTE — PROGRESS NOTES
Unable to complete home medication list. Pt does not know home meds and his wife has his wallet that has his list in it. Offered to call his wife to complete list and pt preferred me not to wake her up and she will bring the list up in the AM. Will hold medications tonight until list is completed. Okay per pt. Will continue to monitor. Call light within reach.

## 2020-07-03 NOTE — PROGRESS NOTES
Pt admitted to room 3303. Admission complete. Pt oriented to room and call light. Pt alert and oriented x4. Bed alarm activated. Non-skid socks on. Pt on continuous tele monitor. Pt denies any pain or needs at this time. Pt resting in bed with no signs of distress. Will continue to monitor. Call light within reach.

## 2020-07-03 NOTE — DISCHARGE SUMMARY
[facility] -- Physician Discharge Summary     Mela Client  1945  MRN: [id number]    Admit Date: 7/2/2020  Discharge Date: No discharge date for patient encounter. Attending MD: Montana Chahal MD  Discharging MD: Montana Chahal MD  PCP: Annabel Cr, [address] [facility] [address] [phone]    Admission Diagnosis: Symptomatic bradycardia [R00.1]  Symptomatic bradycardia [R00.1]  DISCHARGE DIAGNOSIS: bradycardia, PVCs    Full Hospital Problem List:  C/Subhash Kennedy 1106 Problems    Diagnosis Date Noted    SANDRO (acute kidney injury) ([facility] [address] [N17.9] 07/02/2020    Symptomatic bradycardia [R00.1] 07/02/2020    Obesity (BMI 30-39. 9) [E66.9] 02/12/2020    Mild CAD [I25.10] 11/13/2019    Dyslipidemia [E78.5] 12/19/2014    Benign essential HTN [I10] 01/06/2014    Gastroesophageal reflux disease without esophagitis [K21.9] 02/08/2010           Hospital Course:   70 y. o. male who presents to the emergency department with chief complaint of generalized weakness.  Started today. Evan Domingo denies any chest pain, shortness of breath or palpitations. He has a loop recorder in place.  Last July he had a TIA, he had an internal loop recorder insertion for cryptogenic stroke.  Left heart cath at the same time back in October 2019 showed very mild CAD.  Previous visit back in February showed a sinus rhythm with frequent PVCs. [address] burden on MCOT was 25%, PAC burden was 3%.  Low heart rate 42, high heart rate 120.  Today when he felt weak his wife checked pulse oxygenation and heart rate was 37 bpm.  Therefore they came and given the fact that he was newly weak. Case has already been discussed with cards on call and they recommend admission and eval.    Pt is seen by cardiology the next morning  Per Dr Shakeel Hsu:    Bradycardia:  I suspect that this was a manifestation of PVCs. On admit his heart rate was 77 but he had bigeminal PVCs so likely palpable pulse would have been 38.  I doubt that he had significant bradycardia. He has a loop recorder that we will try to interogate. If no severe concerning bradycardia we will likely d/c with EP f/u. If OK can be discharged from a cardiology standpoint with EP f/u  He should call the office at [phone] on Monday and schedule an appointment with Olive Bangura, the EP NP, for 1-2 weeks. Pt feels better and has no recurrence of symptoms  He wishes to be discharged, and he will call office for appt with EP next week      Consults made during Hospitalization:  IP CONSULT TO CARDIOLOGY  IP CONSULT TO CARDIOLOGY    Treatment team at time of Discharge: Treatment Team: Attending Provider: Jimbo Joseph MD; Consulting Physician: FAHAD Merino CNP; Consulting Physician: Jimbo Joseph MD; Consulting Physician: Kelvin Muñoz MD; Utilization Reviewer: Ok John RN; Respiratory Therapist (Day): Vernell Morrison RCP; Registered Nurse: Lili Mcadams RN    Imaging Results:  Xr Chest Portable    Result Date: 7/2/2020  EXAMINATION: ONE XRAY VIEW OF THE CHEST 7/2/2020 6:29 pm COMPARISON: 07/25/2019 HISTORY: ORDERING SYSTEM PROVIDED HISTORY: CP TECHNOLOGIST PROVIDED HISTORY: Reason for exam:->CP Reason for Exam: Bradycardia Acuity: Acute Type of Exam: Initial FINDINGS: The cardial-pericardial silhouette is unremarkable in appearance. The lungs are clear. No pneumothorax is found. No free air is seen. No acute bony abnormality. Unremarkable portable chest radiograph.          Discharge Exam:  /75   Pulse 63   Temp 97.8 °F (36.6 °C) (Oral)   Resp 18   Ht 5' 8\" (1.727 m)   Wt 235 lb 10.8 oz (106.9 kg)   SpO2 94%   BMI 35.83 kg/m²   General appearance: alert, appears stated age and cooperative  Head: Normocephalic, without obvious abnormality, atraumatic  Lungs: clear to auscultation bilaterally  Heart: regular rate and rhythm, S1, S2 normal, no murmur, click, rub or gallop  Abdomen: soft, non-tender; bowel sounds normal; no masses,  no organomegaly  Extremities: extremities normal, atraumatic, no cyanosis or edema    Disposition: home    Condition: stable    Discharge Medications:   Manfred Cancer   Home Medication Instructions [id number]:[id number]    Printed on:07/03/20 2012   Medication Information                      amLODIPine (NORVASC) 5 MG tablet  Take 1 tablet by mouth daily             aspirin 81 MG tablet  Take 81 mg by mouth daily             b complex vitamins capsule  Take 1 capsule by mouth daily             Cholecalciferol (VITAMIN D) 2000 UNITS CAPS capsule  Take 1 capsule by mouth daily              clopidogrel (PLAVIX) 75 MG tablet  Take 75 mg by mouth daily             ezetimibe (ZETIA) 10 MG tablet  Take 1 tablet by mouth daily             Glucosamine HCl-MSM (GLUCOSAMINE-MSM PO)  Take 1 tablet by mouth 2 times daily              hydrochlorothiazide (HYDRODIURIL) 25 MG tablet  Take 1 tablet by mouth daily             losartan (COZAAR) 100 MG tablet  Take 1 tablet by mouth daily             mometasone (NASONEX) 50 MCG/ACT nasal spray  2 sprays by Each Nostril route daily             omeprazole (PRILOSEC) 40 MG delayed release capsule  Take 1 capsule by mouth daily             pravastatin (PRAVACHOL) 80 MG tablet  Take 1 tablet by mouth every evening             solifenacin (VESICARE) 10 MG tablet  Take 10 mg by mouth daily              tamsulosin (FLOMAX) 0.4 MG capsule  Take 1 capsule by mouth daily                 Allergies:  No Known Allergies    Follow up Instructions: Follow-up with PCP: Crispin Valdivia DO in 2 wk .       Total time spent on day of discharge including face-to-face visit, examination, documentation, counseling, preparation of discharge plans and followup, and discharge medicine reconciliation and presciptions is 36 minutes    Signed:  Tuan Lange MD  7/3/2020

## 2020-07-03 NOTE — ED NOTES
Pt up to side of bed to use urinal. No difficulty. Pt assisted back to bed at this time. Updated on poc. Fall precautions remain. Will continue to monitor.       Bhavna Cowart RN  07/03/20 0005

## 2020-07-03 NOTE — PROGRESS NOTES
4 Eyes Skin Assessment     The patient is being assess for  Admission    I agree that 2 RN's have performed a thorough Head to Toe Skin Assessment on the patient. ALL assessment sites listed below have been assessed. Areas assessed by both nurses:   [x]   Head, Face, and Ears   [x]   Shoulders, Back, and Chest  [x]   Arms, Elbows, and Hands   [x]   Coccyx, Sacrum, and IschIum  [x]   Legs, Feet, and Heels        Does the Patient have Skin Breakdown?   No         Martin Prevention initiated:  Yes   Wound Care Orders initiated:  NA      [facility] nurse consulted for Pressure Injury (Stage 3,4, Unstageable, DTI, NWPT, and Complex wounds), New and Established Ostomies:  NA      Nurse 1 eSignature: Electronically signed by Maynor Nesbitt RN on 7/3/20 at 3:38 AM EDT    **SHARE this note so that the co-signing nurse is able to place an eSignature**    Nurse 2 eSignature:2 Electronically signed by Ashlyn Patel RN on 7/3/20 at 3:48 AM EDT

## 2020-07-03 NOTE — CONSULTS
[facility]  Cardiac Consult     Referring Provider:  [address] [facility],          History of Present Illness:  65 y/o male with diastolic CHF, sleep apnea, mild CAD and frequent PVCs who we were asked to see for \"Bradycardia\". He has been seeing Dr. Destiny Macias for PVCs. Monitor showed 25% PVC burden. ECHO normal LV function. Cardiac cath minimal CAD. Consideration of treatment being made but Attari wanted sleep apnea and diastolic CHF treated first. Considering ablation of antiarrythmic therapy. He was told to start following his BP. As such he bought a BP cuff and pulse Ox. Yesterday both of these reported a HR of 37. No exacerbating or relieving factors. Some associated \"weakness\", but he has also had this for some months. He came to the ER and was found to be in ventricular bigeminy. HR was 77. Palpable pulse recorder at 37. Past Medical History:   has a past medical history of BPH, Colon polyps, DDD (degenerative disc disease), lumbar, Diverticulosis, Gallstone pancreatitis, GERD (gastroesophageal reflux disease), Hyperlipidemia, Hypertension, Impaired fasting blood sugar, Kidney stones, Lumbar spondylosis, Mild CAD, Mild CAD, OAB (overactive bladder), AUDIE (obstructive sleep apnea), Osteoarthritis, Osteoarthritis of right knee, Pneumonia, RBBB, Renal cyst, Retinal vasculitis, Retinal vasculitis, Right hemiparesis ([address] Right ureteral stone, Type 2 diabetes mellitus without complication, without long-term current use of insulin ([address] and Type 2 diabetes mellitus without complication, without long-term current use of insulin ([address] Surgical History:   has a past surgical history that includes Appendectomy; Tonsillectomy; Colonoscopy (7/2008); Cardiac catheterization (3/23/2012); right colectomy (3/28/2012); Bone Resection (Left); Cystoscopy (12/2/2014); hernia repair; Cholecystectomy, laparoscopic (09/07/2017); Cataract removal with implant (Left, 11/01/2017);  Cataract removal with implant (Right, 10/2017); Cystoscopy (N/A, 12/13/2018); Cystoscopy (Left, 1/2/2019); Insertable Cardiac Monitor; and Colonoscopy (N/A, 1/15/2020). Social History:   reports that he quit smoking about 43 years ago. His smoking use included cigarettes. He quit after 16.00 years of use. He has never used smokeless tobacco. He reports current alcohol use of about 4.0 standard drinks of alcohol per week. He reports that he does not use drugs. Family History:  family history includes Cancer in his father; Diabetes in his brother; Mental Illness in his mother; Other in his sister; Sleep Apnea in his brother. Medications:   Vitamin D  2,000 Units Oral Daily    tamsulosin  0.4 mg Oral Daily    losartan  100 mg Oral Daily    amLODIPine  5 mg Oral Daily    clopidogrel  75 mg Oral Daily    sodium chloride flush  10 mL Intravenous 2 times per day    atorvastatin  40 mg Oral Nightly    aspirin  325 mg Oral Daily    enoxaparin  40 mg Subcutaneous Daily    pantoprazole  40 mg Oral QAM AC    trospium  20 mg Oral BID AC    fluticasone  2 spray Each Nostril Daily         Allergies:  Patient has no known allergies. ? Medications and dosages reviewed.     ROS:  ?Full ROS obtained and negative except as mentioned in HPI      Physical Examination:    Vitals:    07/03/20 0622   BP:    Pulse: 61   Resp:    Temp:    SpO2:         · GENERAL: Well developed, well nourished, No acute distress  · NEUROLOGICAL: Alert and oriented  · PSYCH: Calm affect  · SKIN: Warm and dry, No visible rash,   · EYES: Pupils equal and round, Sclera non-icteric,   · HENT:  External ears and nose unremarkable, mucus membranes moist  · MUSCULOSKELETAL: Normal cephalic, neck supple  · CAROTID: Normal upstroke, no bruits  · CARDIAC: JVP normal, Normal PMI, regular rate and rhythm, normal S1S2, no murmur, rub, or gallop  · RESPIRATORY: Normal respiratory effort, clear to auscultation bilaterally  · EXTREMITIES: No edema  · GASTROINTESTINAL: normal bowel sounds, soft, non-tender, No hepatomegaly     All testing and labs listed below were personally reviewed. TELE:  Sinus with PVCs, no significant bradycardia    CXR  The cardial-pericardial silhouette is unremarkable in appearance. The lungs  are clear. No pneumothorax is found. No free air is seen. No acute bony  abnormality. Impression:   Unremarkable portable chest radiograph. LABS  Troponin <0.01 ng/mL   D-Dimer, Quant <200 ng/mL DDU   WBC 7.4 K/uL RBC 4.28 M/uL Hemoglobin 13.7 g/dL Hematocrit 40.4Low % MCV 94.2 fL MCH 32.0 pg MCHC 33.9 g/dL RDW 14.2 % Platelets 759 K/uL   Calcium 8.9 mg/dL Total Protein 6.2Low g/dL Alb 3.4 g/dL Albumin/Globulin Ratio 1.2 Total Bilirubin 0.3 mg/dL Alkaline Phosphatase 83 U/L ALT 14 U/L AST 17 U/L   Sodium 142 mmol/L Potassium reflex Magnesium 4.0 mmol/L Chloride 110 mmol/L CO2 21 mmol/L Anion Gap 11 Glucose 149High mg/dL BUN 19 mg/dL CREATININE 1.5High mg/dL     EKG: NSR, RBBB, PVCs    ECHO 7/2019  -Arrhythmia noted. -Normal left ventricle size and systolic function with an estimated ejection   fraction of 55%. There is concentric left ventricular hypertrophy. Grade I   diastolic dysfunction with elevated LV filling pressures. -Mitral annular calcification is present. -Mild mitral regurgitation.   -Aortic valve appears sclerotic but opens adequately.    -Mild tricuspid regurgitation with PASP of 34 mmHg    Myoview 10/2019  No EKG evidence for ischemia with lexiscan       Normal LV size and systolic function.       Myocardial perfusion imaging with small area of decreased uptake in the    inferoapical wall with stress and rest consistent with scar in the territory    typical of the RCA, Cx or LAD arteries.       No significant reversible ischemia       CARDIAC CATH 10/2019  Findings:                      LM       Normal              LAD     20% mid              Cx        Normal              RCA     Normal, nondominant              LVG     Not performed EDP     12 mmHg  Severe Ca++:None  Post Cath Dx:Very mild CAD as above    MCOT:  8/22-9/20/19  Sinus rhythm with frequent PVC's  PVC burden 25%  PAC burden 3%  High   Low HR 42  Average HR 69        Assessment:     Bradycardia:  I suspect that this was a manifestation of PVCs. On admit his heart rate was 77 but he had bigeminal PVCs so likely palpable pulse would have been 38. I doubt that he had significant bradycardia. He has a loop recorder that we will try to interogate. If no severe concerning bradycardia we will likely d/c with EP f/u. PVCs:  Known high PVC burden. Followed by EP. As above if no severe bradycardia will d/c with EP f/u    Plan:  interrogate ILR  If OK can be discharged from a cardiology standpoint with EP f/u  He should call the office at [phone] on Monday and schedule an appointment with Tanvir Negron, the EP NP, for 1-2 weeks. Discussed with nurse and patient.       Thank you for allowing me to participate in the care of this individual.      Salas Ruiz M.D., [facility] [place]

## 2020-07-03 NOTE — H&P
his wife checked pulse oxygenation and heart rate was 37 bpm.  Therefore they came and given the fact that he was newly weak. Case has already been discussed with cards on call and they recommend admission and eval.  Severity: moderate in severity  Quality: weakness  Modifying Factors: nothing makes it better or worse  Associated Signs or Symptoms:   No fever chills or body aches. No nausea vomiting or diarrhea. Problem list of hospitalization thus far: Active Hospital Problems    Diagnosis    SANDRO (acute kidney injury) ([facility] [N17.9]    Symptomatic bradycardia [R00.1]    Obesity (BMI 30-39. 9) [E66.9]    Mild CAD [I25.10]    Dyslipidemia [E78.5]    Benign essential HTN [I10]    Gastroesophageal reflux disease without esophagitis [K21.9]         Review of Systems: (1 system for EPF, 2-9 for detailed, 10+ for comprehensive)  Review of Systems   Constitutional: Negative for chills and fever. HENT: Negative for facial swelling and nosebleeds. Eyes: Negative for pain and redness. Respiratory: Negative for choking and stridor. Cardiovascular: Positive for chest pain. Negative for leg swelling. Gastrointestinal: Negative for nausea and vomiting. Endocrine: Negative for polydipsia and polyphagia. Genitourinary: Negative for frequency and urgency. Musculoskeletal: Negative for gait problem and neck pain. Allergic/Immunologic: Negative for food allergies. Neurological: Negative for weakness and light-headedness. Hematological: Negative for adenopathy. Psychiatric/Behavioral: Negative for confusion. The patient is not nervous/anxious.             Past Medical History:   Past Medical History:   Diagnosis Date    BPH     Colon polyps     DDD (degenerative disc disease), lumbar     Diverticulosis     Gallstone pancreatitis 09/05/2017    GERD (gastroesophageal reflux disease)     Hyperlipidemia     Hypertension     Impaired fasting blood sugar     Kidney stones 11/10    Lumbar spondylosis     Mild CAD 10/14/2019    Dr. Hammond [facility] Mild CAD 11/13/2019    OAB (overactive bladder)     AUDIE (obstructive sleep apnea) 6/4/2013    Osteoarthritis     Osteoarthritis of right knee     Pneumonia     pneumonia    RBBB 12/19/2016    Renal cyst     Retinal vasculitis     Retinal vasculitis     Right hemiparesis ([facility] 7/25/2019    Right ureteral stone     Type 2 diabetes mellitus without complication, without long-term current use of insulin (HCC)     diet controlled    Type 2 diabetes mellitus without complication, without long-term current use of insulin ([address]        Past Surgical History:   Past Surgical History:   Procedure Laterality Date    APPENDECTOMY      BONE RESECTION Left     bone spur removal left elbow    CARDIAC CATHETERIZATION  3/23/2012    CATARACT REMOVAL WITH IMPLANT Left 11/01/2017    CATARACT REMOVAL WITH IMPLANT Right 10/2017    CHOLECYSTECTOMY, LAPAROSCOPIC  09/07/2017    COLONOSCOPY  7/2008    multiple    COLONOSCOPY N/A 1/15/2020    COLONOSCOPY POLYPECTOMY SNARE/COLD BIOPSY performed by Yeimy Mendoza MD at [address] [place] [address]  12/2/2014    Retrograde Pyelogram, stent, Dr. Linsey Boss N/A 12/13/2018    CYSTOSCOPY, LEFT RETROGRADE PYELOGRAM, LEFT STENT PLACEMENT performed by Jayce Villarreal MD at [facility] 1/2/2019    CYSTOSCOPY LEFT URETEROSCOPY HOLMIUM LASER LITHOTRIPSY, STONE MANIPULATION WITH LEFT STENT EXCHANGE performed by Jayce Villarreal MD at [facility]      RIGHT COLECTOMY  3/28/2012    laparoscopic    TONSILLECTOMY         Social History:   Social History     Tobacco History     Smoking Status  Former Smoker Quit date  5/16/1977 Smoking Frequency  For 16 years Smoking Tobacco Type  Cigarettes    Smokeless Tobacco Use  Never Used    Tobacco Comment  1974          Alcohol History     Alcohol Use Status  Yes Drinks/Week  2 Standard drinks or equivalent, 1 Cans of beer, 1 Glasses of wine per week Amount  4.0 standard drinks of alcohol/wk Comment  ONCE A WEEK          Drug Use     Drug Use Status  Never          Sexual Activity     Sexually Active  Yes Partners  Female                Fam History:   Family History   Problem Relation Age of Onset    Mental Illness Mother         Alzheimers    Cancer Father         Leukemia    Diabetes Brother     Sleep Apnea Brother     Other Sister         pyloric valve repacement       PFSH: The above PMHx, PSHx, SocHx, FamHx has been reviewed by myself. (1 area for detailed, 2-3 for comprehensive)      Code Status: Prior    Meds - following list of home medications fromelectronic chart has been reviewed by myself  Prior to Admission medications    Medication Sig Start Date End Date Taking?  Authorizing Provider   hydrochlorothiazide (HYDRODIURIL) 25 MG tablet Take 1 tablet by mouth daily 3/13/20   Andrew Vargas MD   clopidogrel (PLAVIX) 75 MG tablet Take 75 mg by mouth daily    Historical Provider, MD   mometasone (NASONEX) 50 MCG/ACT nasal spray 2 sprays by Each Nostril route daily  Patient taking differently: 2 sprays by Each Nostril route daily as needed  12/9/19   Scotty Stewart DO   pravastatin (PRAVACHOL) 80 MG tablet Take 1 tablet by mouth every evening 12/9/19 12/8/20  Scotty Clement DO   ezetimibe (ZETIA) 10 MG tablet Take 1 tablet by mouth daily 12/9/19   Scotty Stewart DO   amLODIPine (NORVASC) 5 MG tablet Take 1 tablet by mouth daily 12/9/19   Scotty Stewart DO   losartan (COZAAR) 100 MG tablet Take 1 tablet by mouth daily 11/27/19 11/26/20  Scotty Stewart DO   b complex vitamins capsule Take 1 capsule by mouth daily    Historical Provider, MD   omeprazole (PRILOSEC) 40 MG delayed release capsule Take 1 capsule by mouth daily 8/28/19   Scotty Stewart DO   solifenacin (VESICARE) 10 MG tablet Take 10 mg by mouth daily     Historical Provider, MD   tamsulosin (FLOMAX) 0.4 MG capsule Take 1 capsule by mouth daily 2/9/18   Scotty Stewart,    aspirin 81 MG tablet Take 81 mg by mouth daily    Historical Provider, MD   Cholecalciferol (VITAMIN D) 2000 UNITS CAPS capsule Take 1 capsule by mouth daily     Historical Provider, MD   Glucosamine HCl-MSM (GLUCOSAMINE-MSM PO) Take 1 tablet by mouth 2 times daily     Historical Provider, MD         No Known Allergies          EXAM: (2-7 system for EPF/Detailed, ?8 for Comprehensive)  /62   Pulse (!) 37   Temp 98.1 °F (36.7 °C)   Resp 15   SpO2 97%   Constitutional: vitals as above: alert, appears stated age and cooperative  Psychiatric: normal insight and judgment, oriented to person, place, time, and general circumstances  Head: Normocephalic, without obvious abnormality, atraumatic  Eyes:lids and lashes normal, conjunctivae and sclerae normal and pupils equal, round, reactive to light and accomodation  EMNT: external ears normal, lips normal  Neck: no JVD, supple, symmetrical, trachea midline and thyroid not enlarged, symmetric, no tenderness/mass/nodules   Respiratory: clear to auscultation and percussion bilaterally with normal respiratory effort  Cardiovascular: bradycardic, nl s1s2, no rmg  Gastrointestinal: soft, non-tender, non-distended, normal bowel sounds, no masses or organomegaly  Lymphatic: No cervical, supraclavicular or axillary lymphadenopathy noted. Extremities:   Skin:No rashes or nodules noted.   Neurologic:negative    LABS:  Labs Reviewed   COMPREHENSIVE METABOLIC PANEL W/ REFLEX TO MG FOR LOW K - Abnormal; Notable for the following components:       Result Value    Glucose 120 (*)     BUN 22 (*)     CREATININE 1.8 (*)     GFR Non- 37 (*)     GFR  45 (*)     All other components within normal limits    Narrative:     Performed at:  [facility] [address], [place], [address]   Phone [phone]   CBC WITH AUTO DIFFERENTIAL    Narrative:     Performed at:  [facility]  [address] [facility],  [place], [address]   Phone [phone]   TROPONIN    Narrative:     Performed at:  [facility]  [address],  [place], [address]   Phone [phone]   PROTIME-INR    Narrative:     Performed at:  [facility]  [address] [facility],  [place], [address]   Phone [phone]   MAGNESIUM    Narrative:     Performed at:  [facility]  [address] [facility],  [place], [address]   Phone [phone]         IMAGING:  Imaging results from the ER have been reviewed in the computerized chart. Xr Chest Portable    Result Date: 7/2/2020  EXAMINATION: ONE XRAY VIEW OF THE CHEST 7/2/2020 6:29 pm COMPARISON: 07/25/2019 HISTORY: ORDERING SYSTEM PROVIDED HISTORY: CP TECHNOLOGIST PROVIDED HISTORY: Reason for exam:->CP Reason for Exam: Bradycardia Acuity: Acute Type of Exam: Initial FINDINGS: The cardial-pericardial silhouette is unremarkable in appearance. The lungs are clear. No pneumothorax is found. No free air is seen. No acute bony abnormality. Unremarkable portable chest radiograph. EKG: from ER, interpreted by self, sinus rhythm at 77. No acute st elevation noted. No TWI seen. Comparison made to EKG In old chart dated 10/14/19, appears similar, though rate slower at [address]:    Principal Problem:    Symptomatic bradycardia -New Problem to me. Pt with some sx. HR in ER noted to be in 35s. Case has already been discussed with Cards on call per my d/w Dr Isabel Ramos: admit, trend troponin. Monitor on tele. Atropine at bedside. Active Problems:    Gastroesophageal reflux disease without esophagitis -Established problem. Stable. No isses  Plan: cont PPI    Benign essential HTN -Established problem. Stable.   126/62  Plan: Pt home BP meds reviewed and will be continued - none of his BP meds should affect HR. IV Hydralazine ordered for control of

## 2020-07-06 PROBLEM — N17.9 AKI (ACUTE KIDNEY INJURY) (HCC): Status: RESOLVED | Noted: 2020-07-02 | Resolved: 2020-07-06

## 2020-07-06 PROBLEM — I49.3 PVC (PREMATURE VENTRICULAR CONTRACTION): Status: ACTIVE | Noted: 2020-07-06

## 2020-07-06 PROBLEM — Z45.09 ENCOUNTER FOR ELECTRONIC ANALYSIS OF REVEAL EVENT RECORDER: Status: RESOLVED | Noted: 2019-10-21 | Resolved: 2020-07-06

## 2020-07-06 PROBLEM — Z45.09 ENCOUNTER FOR LOOP RECORDER CHECK: Status: ACTIVE | Noted: 2020-07-06

## 2020-07-06 NOTE — PROGRESS NOTES
[address]   Electrophysiology  FAHAD Burgos-CLAUDIO  Attending EP: Dr. Taylor Garland    Date: 7/8/2020  I had the privilege of visiting Jose Rogers in the office. Chief Complaint:   Chief Complaint   Patient presents with    Follow-up    Bradycardia     History of Present Illness: History obtained from patient and medical record. Jose Rogers is 66 y.o. male with a past medical history of HTN, HLD, CVA, DM, CHF, AUDIE, and RBBB. In July of 2019, he was admitted for fatigue and right sided weakness. He was diagnosed with TIA and started on ASA/plavix. S/p ILR for cryptogenic stroke (10/14/19)    -Interval history: Today, Jose Rogers is being seen for hospital follow up. His wife reports he had a grayish color to him and his pulse was in the 30s on his pulse ox so his wife made him go to the ER. He was noted to be in bigeminy with PVCs. He feels well since discharge. He is compliant with his medications and CPAP, which he tolerates well. The patient denies any overt symptoms of PVCs, but does state that he becomes SOB with heavy exertion and fatigued. We discussed the need for his echo and possible PVC ablation in near future. His device interrogation shows two episodes of bradycardia in the 30s on July 7th, which occurred while he was sleeping yesterday. He reports that he is compliant with his medications and CPAP. Denies having chest pain, palpitations, shortness of breath, orthopnea/PND, cough, or dizziness at the time of this visit. With regard to medication therapy the patient has been compliant with prescribed regimen. They have tolerated therapy to date. Allergies:  No Known Allergies    Home Medications:  Prior to Visit Medications    Medication Sig Taking?  Authorizing Provider   hydrochlorothiazide (HYDRODIURIL) 25 MG tablet Take 1 tablet by mouth daily Yes Miguel Marino MD   clopidogrel (PLAVIX) 75 MG tablet Take 75 mg by mouth daily Yes Historical Provider, MD   mometasone diabetes mellitus without complication, without long-term current use of insulin (HCC)     diet controlled    Type 2 diabetes mellitus without complication, without long-term current use of insulin ([facility] [address]      Past Surgical History:    has a past surgical history that includes Appendectomy; Tonsillectomy; Colonoscopy (7/2008); Cardiac catheterization (3/23/2012); right colectomy (3/28/2012); Bone Resection (Left); Cystoscopy (12/2/2014); hernia repair; Cholecystectomy, laparoscopic (09/07/2017); Cataract removal with implant (Left, 11/01/2017); Cataract removal with implant (Right, 10/2017); Cystoscopy (N/A, 12/13/2018); Cystoscopy (Left, 1/2/2019); Insertable Cardiac Monitor; and Colonoscopy (N/A, 1/15/2020). Social History:  Reviewed. reports that he quit smoking about 43 years ago. His smoking use included cigarettes. He quit after 16.00 years of use. He has never used smokeless tobacco. He reports current alcohol use of about 4.0 standard drinks of alcohol per week. He reports that he does not use drugs. Family History:  Reviewed. family history includes Cancer in his father; Diabetes in his brother; Mental Illness in his mother; Other in his sister; Sleep Apnea in his brother. Review of System:  · Constitutional: Positive for fatigue (on going).  Negative for fever, night sweats, chills, weight changes, or weakness  · Skin: Negative for rash, dry skin, pruritus, bruising, bleeding, blood clots, or changes in skin pigment  · HEENT: Negative for vision changes, ringing in the ears, sore throat, dysphagia, or swollen lymph nodes  · Respiratory: Positive for SOB/FALCON  · Cardiovascular: Reviewed in HPI  · Gastrointestinal: Negative for abdominal pain, N/V/D, constipation, or black/tarry stools  · Genito-Urinary: Negative for dysuria, incontinence, urgency, or hematuria  · Musculoskeletal: Negative for joint swelling, muscle pain, or injuries  · Neurological/Psych: Negative for confusion, seizures, dizziness, headaches, balance issues or TIA-like symptoms. No anxiety, depression, or insomnia    Physical Examination:  Vitals:    07/08/20 1455   BP: 102/70   Pulse: 67      Wt Readings from Last 3 Encounters:   07/08/20 230 lb (104.3 kg)   07/03/20 235 lb 10.8 oz (106.9 kg)   04/30/20 226 lb (102.5 kg)     Constitutional: Cooperative and in no apparent distress, and appears well nourished  Skin: Warm and pink; no pallor, cyanosis, bruising, or clubbing  HEENT: Symmetric and normocephalic. PERRL, EOM intact. Conjunctiva pink with clear sclera. Mucus membranes pink and moist. Teeth intact. Thyroid smooth without nodules or goiter  Respiratory: Respirations symmetric and unlabored. Lungs clear to auscultation bilaterally, no wheezing, rhonchi, or crackles  Cardiovascular:  Regular rate and rhythm. S1/S2 present without murmurs, rubs, or gallops. Peripheral pulses 2+, capillary refill < 3 seconds. No elevation of JVP. No peripheral edema  Gastrointestinal: Abdomen soft and round. Bowel sounds normoactive in all quadrants without tenderness or masses. + Obese  Musculoskeletal: Bilateral upper and lower extremity strength 5/5 with full ROM. Neurological/Psych: Awake and orientated to person, place and time. Calm affect, appropriate mood. Pertinent labs, diagnostic, device, and imaging results reviewed as a part of this visit    LABS    CBC:   Lab Results   Component Value Date    WBC 7.4 07/03/2020    HGB 13.7 07/03/2020    HCT 40.4 (L) 07/03/2020    MCV 94.2 07/03/2020     07/03/2020     BMP:   Lab Results   Component Value Date    CREATININE 1.5 (H) 07/03/2020    BUN 19 07/03/2020     07/03/2020    K 4.0 07/03/2020     07/03/2020    CO2 21 07/03/2020     Estimated Creatinine Clearance: 49 mL/min (A) (based on SCr of 1.5 mg/dL (H)).      Thyroid:   Lab Results   Component Value Date    TSH 1.36 08/26/2019     Lipid Panel:   Lab Results   Component Value Date    CHOL 112 07/03/2020    HDL 38 2020    HDL 50 2011    TRIG 173 2020     LFTs:  Lab Results   Component Value Date    ALT 14 2020    AST 17 2020    ALKPHOS 83 2020    BILITOT 0.3 2020     Coags:   Lab Results   Component Value Date    PROTIME 10.7 2020    INR 0.92 2020    APTT 28.5 2019       EC2020  - NSR with RBBB and LAFB    Echo:    -Arrhythmia noted. -Normal left ventricle size and systolic function with an estimated ejection   fraction of 55%. There is concentric left ventricular hypertrophy. Grade I   diastolic dysfunction with elevated LV filling pressures. -Mitral annular calcification is present. -Mild mitral regurgitation.   -Aortic valve appears sclerotic but opens adequately. -Mild tricuspid regurgitation with PASP of 34 mmHg. GXT: 10/19  No EKG evidence for ischemia with lexiscan       Normal LV size and systolic function.       Myocardial perfusion imaging with small area of decreased uptake in the    inferoapical wall with stress and rest consistent with scar in the territory    typical of the RCA, Cx or LAD arteries.       No significant reversible ischemia     Cath: 10/14/19   LM       Normal              LAD     20% mid              Cx        Normal              RCA     Normal, nondominant              LVG     Not performed              EDP     12 mmHg     MCOT:  -19  Sinus rhythm with frequent PVC's  PVC burden 25%  PAC burden 3%  High   Low HR 42  Average HR 69    Assessment:    1. PVC's  - Holter monitor reviewed: Patient had high PVC burden 25% ()    - Discussed PVCs, risks, therapies and alternatives with patient. All questions were answered  - Treatment options including medical therapy with antiarrhythmic drugs or ablation discussed   ~ Limited medical therapies due to low-normal resting heart rate   ~ His symptoms of fatigue/SOB appear to be correlated to his PVCs.  Will complete echo and if EF affected, will need PVC controlled for cardiovascular risk factor modification   The patient is counseled to avoid excess caffeine, and energy drinks as this may exacerbated ectopy and arrhythmia   The patient is counseled to lose weight to control cardiovascular risk factors   Exercise program discussed: To improve overall cardiovascular health, the patient is instructed to increase cardiovascular related activities with a goal of 150 min/week of moderate level activity or 10,000 steps per day. Encouraged to perform as much activity as tolerated    Quality Metrics  1. Tobacco Cessation Counseling: N/A  2. Retake of BP if >140/90: N/A  3. Documentation to PCP: Note sent to PCP office visit  4. CAD patient on anti-platelet: Yes  5. CAD patient on STATIN therapy: Yes   6. Patient with history of CHF and atrial fibrillation on anticoagulation: N/A      I have addressed the patient's cardiac risk factors and adjusted pharmacologic treatment as needed. In addition, I have reinforced the need for patient directed risk factor modification. I independently reviewed the device check interrogation and ECG    All questions and concerns were addressed with the patient. Alternatives to treatment were discussed. Thank you for allowing to us to participate in the care of Augie Reynoso.     Roberto Carlos Cooper, APRN-CNP  [address]   Office: [phone]

## 2020-07-07 ENCOUNTER — VIRTUAL VISIT (OUTPATIENT)
Dept: PULMONOLOGY | Age: 75
End: 2020-07-07
Payer: MEDICARE

## 2020-07-07 PROCEDURE — 99442 PR PHYS/QHP TELEPHONE EVALUATION 11-20 MIN: CPT | Performed by: NURSE PRACTITIONER

## 2020-07-07 ASSESSMENT — SLEEP AND FATIGUE QUESTIONNAIRES
HOW LIKELY ARE YOU TO NOD OFF OR FALL ASLEEP WHILE WATCHING TV: 1
HOW LIKELY ARE YOU TO NOD OFF OR FALL ASLEEP IN A CAR, WHILE STOPPED FOR A FEW MINUTES IN TRAFFIC: 0
HOW LIKELY ARE YOU TO NOD OFF OR FALL ASLEEP WHILE LYING DOWN TO REST IN THE AFTERNOON WHEN CIRCUMSTANCES PERMIT: 3
ESS TOTAL SCORE: 12
HOW LIKELY ARE YOU TO NOD OFF OR FALL ASLEEP WHILE SITTING INACTIVE IN A PUBLIC PLACE: 0
HOW LIKELY ARE YOU TO NOD OFF OR FALL ASLEEP WHILE SITTING AND TALKING TO SOMEONE: 0
HOW LIKELY ARE YOU TO NOD OFF OR FALL ASLEEP WHILE SITTING AND READING: 3
HOW LIKELY ARE YOU TO NOD OFF OR FALL ASLEEP WHEN YOU ARE A PASSENGER IN A CAR FOR AN HOUR WITHOUT A BREAK: 3
HOW LIKELY ARE YOU TO NOD OFF OR FALL ASLEEP WHILE SITTING QUIETLY AFTER LUNCH WITHOUT ALCOHOL: 2

## 2020-07-07 NOTE — ASSESSMENT & PLAN NOTE
Unable to Review compliance download with pt. Supplies and parts as needed for his machine. These are medically necessary. Continue medications per his PCP and other physicians. Limit caffeine use after 3pm.  Encouraged him to work on weight loss through diet and exercise. Diagnoses of Benign essential HTN, Gastroesophageal reflux disease without esophagitis, Obesity (BMI 30-39.9), RBBB, and Type 2 diabetes mellitus without complication, without long-term current use of insulin ([facility] [address] were pertinent to this visit. The chronic medical conditions listed are directly related to the primary diagnosis listed above. The management of the primary diagnosis affects the secondary diagnosis and vice versa.

## 2020-07-07 NOTE — LETTER
[facility]  [address] [facility] [address]  Phone: [phone]  Fax: [phone]    July 7, 2020       Patient: Perry Fajardo   MR Number: [id number]   YOB: 1945   Date of Visit: 7/7/2020       Micaela Cee was seen for a follow up visit today. Here is my assessment and plan as well as an attached copy of his visit today:    Benign essential HTN  Chronic- Stable. Cont meds per PCP and other physicians. Gastroesophageal reflux disease without esophagitis  Chronic- Stable. Cont meds per PCP and other physicians. Obesity (BMI 30-39. 9)  Chronic-Stable. Encouraged him to work on weight loss through diet and exercise. RBBB  Chronic- Stable. Cont meds per PCP and other physicians. Type 2 diabetes mellitus without complication, without long-term current use of insulin ([facility])  Chronic- Stable. Cont meds per PCP and other physicians. Severe sleep apnea  Unable to Review compliance download with pt. Supplies and parts as needed for his machine. These are medically necessary. Continue medications per his PCP and other physicians. Limit caffeine use after 3pm.  Encouraged him to work on weight loss through diet and exercise. Diagnoses of Benign essential HTN, Gastroesophageal reflux disease without esophagitis, Obesity (BMI 30-39.9), RBBB, and Type 2 diabetes mellitus without complication, without long-term current use of insulin ([facility] [address] were pertinent to this visit. The chronic medical conditions listed are directly related to the primary diagnosis listed above. The management of the primary diagnosis affects the secondary diagnosis and vice versa. If you have questions or concerns, please do not hesitate to call me. I look forward to following Domitila Espinosa along with you.     Sincerely,    Cailin Lutz, APRN - CNP    CC providers:  Demi Colin, [address] [facility] [address]

## 2020-07-07 NOTE — PROGRESS NOTES
Loren Muse MD, FAASM, [facility]  Staci Dillard, MSN, RN, CNP     [address] [place] [address] SLEEP MEDICINE  [address] [address]  Dept: [phone]  Dept Fax: : Tanja Mckinney [facility] SLEEP MEDICINE  [address] [place] [id number] [phone]    Subjective:     Patient ID: Victoriano Landin is a 66 y.o. male. Chief Complaint   Patient presents with    Sleep Apnea       HPI:      Sleep Medicine Telephone Visit    Pursuant to the emergency declaration under the [facility] waiver authority and the [employer] Act this Telephone Visit was insisted, with patient's consent, to reduce the patient's risk of exposure to COVID-19 and provide continuity of care for an established patient. Services were provided through a synchronous discussion over a telephone and/or Video chat to substitute for in-person clinic visit, and coded as such. While patient is at home. Machine Modem/Download Info:                                        PAP Mask  Mask Type: Full Face mask     [place] - Total score: 12    Follow-up :     Last Visit : March 2020      Patient reports the listed chronic Co-morbidities: HTN, GERD, Obesity, RBBB, DM   are well controlled and stable at this time. Subjective Health Changes: None      Over Night Oximetry: [] Yes  [] No  [x] NA [] WNL   Using O2: [] Yes  [] No  [x] NA   Patient is compliant with the machine  Per patient    Feeling rested when using the machine   [x] Yes  [] No     Pressure is comfortable with inspiration and expiration  [x] Yes  [] No     Noticed changes in pressure   [x] Yes  [] No  [] NA   Mask is fitting well  [x] Yes  [] No   Noting Mask Air Leak  [x] Yes  [] No   Having painful Aerophagia  [] Yes  [x] No   Nocturia   5-6  per night.    Having  HA upon waking  [] Yes  [x] No   Dry mouth upon waking   Dry Nose  Dry Eyes  [x] Yes  [] No   Congestion upon waking   [] Yes  [x] No    Nose Bleeds  [] Yes  [] No   Using Sleep Aides    [x] NA   Understands how to change humidification and/or tubing temperature for comfort while at home  [x] Yes  [] No     Difficulties falling asleep  [] Yes  [x] No   Difficulties staying asleep  [] Yes  [x] No   Approximate time to bed  9-10pm   Approximate wake time  6am   Taking Naps  frequent   If taking naps usual length  1-2 hours    If taking naps using the machine  [] Yes  [x] No  [] NA [] With and With out    Drowsy when driving  [] Yes  [x] No     Does patient carry a DOT/CDL  [] Yes  [x] No     Does patient carry FAA/[employer] License   [] Yes  [x] No      Any concerns noted with the machine at this time  [] Yes  [x] No        Diagnosis Orders   1. Severe sleep apnea     2. Benign essential HTN     3. Gastroesophageal reflux disease without esophagitis     4. Obesity (BMI 30-39.9)     5. RBBB     6. Type 2 diabetes mellitus without complication, without long-term current use of insulin (HCC)         The chronic medical conditions listed are directly related to the primary diagnosis listed above. The management of the primary diagnosis affects the secondary diagnosis and vice versa. Assessment/Plan:     Benign essential HTN  Chronic- Stable. Cont meds per PCP and other physicians. Gastroesophageal reflux disease without esophagitis  Chronic- Stable. Cont meds per PCP and other physicians. Obesity (BMI 30-39. 9)  Chronic-Stable. Encouraged him to work on weight loss through diet and exercise. RBBB  Chronic- Stable. Cont meds per PCP and other physicians. Type 2 diabetes mellitus without complication, without long-term current use of insulin (HCC)  Chronic- Stable. Cont meds per PCP and other physicians. Severe sleep apnea  Unable to Review compliance download with pt.   Supplies and parts as needed or card to the DME if they need the download as we are not able to access the modem and it is safer then our office with the COVID-19 pandemic  -F/U: 12 months      No orders of the defined types were placed in this encounter. No orders of the defined types were placed in this encounter. No orders of the defined types were placed in this encounter.       Wendy Schafer, MSN, RN, CNP

## 2020-07-07 NOTE — PROGRESS NOTES
Katelyn Speak         : 1945    Diagnosis: [x] AUDIE (G47.33) [] CSA (G47.31) [] Apnea (G47.30)   Length of Need: [x] 12 Months [] 99 Months [] Other:    Machine (JUAN MIGUEL!): [x] Respironics Dream Station      Auto [] ResMed AirSense     Auto [] Other:     []  CPAP () [] Bilevel ()   Mode: [] Auto [] Spontaneous    Mode: [] Auto [] Spontaneous                            Comfort Settings:   - Ramp Pressure:  cmH2O                                        - Ramp time: 15 min                                     -  Flex/EPR - 3 full time                                    - For ResMed Bilevel (TiMax-4 sec   TiMin- 0.2 sec)     Humidifier: [x] Heated ()        [x] Water chamber replacement ()/ 1 per 6 months        Mask:   [] Nasal () /1 per 3 months [x] Full Face () /1 per 3 months   [] Patient choice -Size and fit mask [x] Patient Choice - Size and fit mask   [] Dispense:  [] Dispense:    [] Headgear () / 1 per 3 months [x] Headgear () / 1 per 3 months   [] Replacement Nasal Cushion ()/2 per month [x] Interface Replacement ()/1 per month   [] Replacement Nasal Pillows ()/2 per month         Tubing: [x] Heated ()/1 per 3 months    [] Standard ()/1 per 3 months [] Other:           Filters: [x] Non-disposable ()/1 per 6 months     [x] Ultra-Fine, Disposable ()/2 per month        Miscellaneous: [] Chin Strap ()/ 1 per 6 months [] O2 bleed-in:       LPM   [] Oximetry on CPAP/Bilevel []  Other:    [x] Modem: ()         Start Order Date: 20    MEDICAL JUSTIFICATION:  I, the undersigned, certify that the above prescribed supplies are medically necessary for this patients wellbeing. In my opinion, the supplies are both reasonable and necessary in reference to accepted standards of medicalpractice in treatment of this patients condition.     FAHAD Macias - CLAUDIO      NPI: [id number]       Order Signed Date: 07/07/20    Electronically signed by FAHAD Figueroa CNP on 7/7/2020 at 1:39 PM

## 2020-07-08 ENCOUNTER — OFFICE VISIT (OUTPATIENT)
Dept: CARDIOLOGY CLINIC | Age: 75
End: 2020-07-08
Payer: MEDICARE

## 2020-07-08 ENCOUNTER — NURSE ONLY (OUTPATIENT)
Dept: CARDIOLOGY CLINIC | Age: 75
End: 2020-07-08
Payer: MEDICARE

## 2020-07-08 VITALS
HEIGHT: 68 IN | WEIGHT: 230 LBS | HEART RATE: 67 BPM | BODY MASS INDEX: 34.86 KG/M2 | SYSTOLIC BLOOD PRESSURE: 102 MMHG | DIASTOLIC BLOOD PRESSURE: 70 MMHG

## 2020-07-08 PROBLEM — I50.32 CHRONIC DIASTOLIC HEART FAILURE (HCC): Status: ACTIVE | Noted: 2020-07-08

## 2020-07-08 PROCEDURE — 99214 OFFICE O/P EST MOD 30 MIN: CPT | Performed by: NURSE PRACTITIONER

## 2020-07-08 PROCEDURE — 1111F DSCHRG MED/CURRENT MED MERGE: CPT | Performed by: NURSE PRACTITIONER

## 2020-07-08 PROCEDURE — G8417 CALC BMI ABV UP PARAM F/U: HCPCS | Performed by: NURSE PRACTITIONER

## 2020-07-08 PROCEDURE — 1123F ACP DISCUSS/DSCN MKR DOCD: CPT | Performed by: NURSE PRACTITIONER

## 2020-07-08 PROCEDURE — 93000 ELECTROCARDIOGRAM COMPLETE: CPT | Performed by: NURSE PRACTITIONER

## 2020-07-08 PROCEDURE — 4040F PNEUMOC VAC/ADMIN/RCVD: CPT | Performed by: NURSE PRACTITIONER

## 2020-07-08 PROCEDURE — G8427 DOCREV CUR MEDS BY ELIG CLIN: HCPCS | Performed by: NURSE PRACTITIONER

## 2020-07-08 PROCEDURE — 1036F TOBACCO NON-USER: CPT | Performed by: NURSE PRACTITIONER

## 2020-07-08 PROCEDURE — 93291 INTERROG DEV EVAL SCRMS IP: CPT | Performed by: INTERNAL MEDICINE

## 2020-07-08 NOTE — PROGRESS NOTES
Patient comes in for interrogation of their implanted loop recorder. Interrogation shows 2 gallito episodes. Last on 7/7/2020 with a Min V rate of <30 bpm. Implanted for Stroke. Today we changed to gallito detection to 40 bpm from 30 bpm. Please see interrogation for more detail. Patient will see Ronald Martínez our NP today and we will continue to follow the patient remotely.

## 2020-07-10 ENCOUNTER — TELEPHONE (OUTPATIENT)
Dept: INTERNAL MEDICINE CLINIC | Age: 75
End: 2020-07-10

## 2020-07-10 NOTE — TELEPHONE ENCOUNTER
Follow up phone call t wife.   Outreach was not made in 3 business days due to not made aware that pt was in hospital.

## 2020-07-10 NOTE — TELEPHONE ENCOUNTER
[facility] Trans[facility] Initial Follow Up Call    Outreach made within 2 business days of discharge: No    Patient: Manuela Pritchett Patient : 1945   MRN: [id number]  Reason for Admission: There are no discharge diagnoses documented for the most recent discharge. Discharge Date: 7/3/20       Spoke with: wife    Discharge department/facility: [facility]    TCM Interactive Patient Contact:  Was patient able to fill all prescriptions: Yes  Was patient instructed to bring all medications to the follow-up visit: Yes  Is patient taking all medications as directed in the discharge summary?  Yes  Does patient understand their discharge instructions: Yes  Does patient have questions or concerns that need addressed prior to 7-14 day follow up office visit: no    Scheduled appointment with PCP within 7-14 days    Follow Up  Future Appointments   Date Time Provider Ella Villalobos   7/15/2020  2:00 PM ECHO ROOM 1 [facility]   2020  3:00 PM DO YAAKOV Nolan [facility]   2020  1:45 PM FAHAD Booth CNP FF Cardio [facility]   8/10/2020  7:00 AM SCHEDULE, [facility] Cardio [facility]   2020  3:30 PM Lyle Ferro MD FF Cardio [facility]   2021  1:20 PM FAHAD Skaggs CNP FF SLEEP MED [facility]       Telma Mcneil, [facility] [address]

## 2020-07-15 ENCOUNTER — HOSPITAL ENCOUNTER (OUTPATIENT)
Dept: NON INVASIVE DIAGNOSTICS | Age: 75
Discharge: HOME | End: 2020-07-15
Payer: MEDICARE

## 2020-07-15 LAB
LV EF: 58 %
LVEF MODALITY: NORMAL

## 2020-07-15 PROCEDURE — 93306 TTE W/DOPPLER COMPLETE: CPT

## 2020-07-15 RX ORDER — CLOPIDOGREL BISULFATE 75 MG/1
75 TABLET ORAL DAILY
Qty: 90 TABLET | Refills: 1 | Status: SHIPPED | OUTPATIENT
Start: 2020-07-15 | End: 2020-11-16

## 2020-07-15 NOTE — PROGRESS NOTES
[address]   Electrophysiology  LELA Francisco  Attending EP: Dr. Mateo Cronin    Date: 8/5/2020  I had the privilege of visiting Julia Bunn in the office. Chief Complaint:   Chief Complaint   Patient presents with    1 Month Follow-Up     DEVICE CHECK    Bradycardia    Shortness of Breath    Fatigue     History of Present Illness: History obtained from patient and medical record. Julia Bunn is 66 y.o. male with a past medical history of HTN, HLD, CVA, DM, CHF, AUDIE, and RBBB. In July of 2019, he was admitted for fatigue and right sided weakness. He was diagnosed with TIA and started on ASA/plavix. S/p ILR for cryptogenic stroke (10/14/19)     -Interval history: Today, Julia Bunn is being seen for follow up. His wife is present for the visit. He is still having frequent PVCs on EKG and on his loop recorder with symptoms. He remains very symptomatic from his PVCs. His EF has remains stable at 55%, however, he fatigues and becomes SOB with exertion much easier than he use to. Last year, he wore a holter which showed a PVC burden of 25%. He would like to pursue an ablation to see if it helps his symptoms. R/B/A discussed with pt. Pt is compliant with his medications. He wears his CPAP nightly, which he tolerates well. His BP is stable, 118/80. His wife states this runs near his baseline at home. Denies having chest pain, palpitations, shortness of breath, orthopnea/PND, cough, or dizziness at the time of this visit. With regard to medication therapy the patient has been compliant with prescribed regimen. They have tolerated therapy to date. Allergies:  No Known Allergies    Home Medications:  Prior to Visit Medications    Medication Sig Taking?  Authorizing Provider   clopidogrel (PLAVIX) 75 MG tablet Take 1 tablet by mouth daily Yes FAHAD Francisco CNP   hydrochlorothiazide (HYDRODIURIL) 25 MG tablet Take 1 tablet by mouth daily Yes Radha Workman MD   mometasone (NASONEX) 50 MCG/ACT nasal spray 2 sprays by Each Nostril route daily  Patient taking differently: 2 sprays by Each Nostril route daily as needed  Yes Scotty Stewart DO   pravastatin (PRAVACHOL) 80 MG tablet Take 1 tablet by mouth every evening Yes Scotty Stewart DO   ezetimibe (ZETIA) 10 MG tablet Take 1 tablet by mouth daily Yes Scotty Stewart DO   amLODIPine (NORVASC) 5 MG tablet Take 1 tablet by mouth daily Yes Scotty Stewart DO   losartan (COZAAR) 100 MG tablet Take 1 tablet by mouth daily Yes Scotty Stewart DO   b complex vitamins capsule Take 1 capsule by mouth daily Yes Historical Provider, MD   omeprazole (PRILOSEC) 40 MG delayed release capsule Take 1 capsule by mouth daily Yes Scotty Stewart DO   solifenacin (VESICARE) 10 MG tablet Take 10 mg by mouth daily  Yes Historical Provider, MD   tamsulosin (FLOMAX) 0.4 MG capsule Take 1 capsule by mouth daily Yes Scotty Stewart DO   aspirin 81 MG tablet Take 81 mg by mouth daily Yes Historical Provider, MD   Cholecalciferol (VITAMIN D) 2000 UNITS CAPS capsule Take 1 capsule by mouth daily  Yes Historical Provider, MD   Glucosamine HCl-MSM (GLUCOSAMINE-MSM PO) Take 1 tablet by mouth 2 times daily  Yes Historical Provider, MD      Past Medical History:  Past Medical History:   Diagnosis Date    BPH     Colon polyps     DDD (degenerative disc disease), lumbar     Diverticulosis     Gallstone pancreatitis 09/05/2017    GERD (gastroesophageal reflux disease)     Hyperlipidemia     Hypertension     Impaired fasting blood sugar     Kidney stones 11/10    Lumbar spondylosis     Mild CAD 10/14/2019    Dr. Nancie Singh, [facility]    Mild CAD 11/13/2019    OAB (overactive bladder)     AUDIE (obstructive sleep apnea) 6/4/2013    Osteoarthritis     Osteoarthritis of right knee     Pneumonia     pneumonia    RBBB 12/19/2016    Renal cyst     Retinal vasculitis     Retinal vasculitis     Right hemiparesis ([address] 7/25/2019    Right ureteral stone     Type 2 diabetes mellitus without complication, without long-term current use of insulin (HCC)     diet controlled    Type 2 diabetes mellitus without complication, without long-term current use of insulin ([facility] [address]      Past Surgical History:    has a past surgical history that includes Appendectomy; Tonsillectomy; Colonoscopy (7/2008); Cardiac catheterization (3/23/2012); right colectomy (3/28/2012); Bone Resection (Left); Cystoscopy (12/2/2014); hernia repair; Cholecystectomy, laparoscopic (09/07/2017); Cataract removal with implant (Left, 11/01/2017); Cataract removal with implant (Right, 10/2017); Cystoscopy (N/A, 12/13/2018); Cystoscopy (Left, 1/2/2019); Insertable Cardiac Monitor; and Colonoscopy (N/A, 1/15/2020). Social History:  Reviewed. reports that he quit smoking about 43 years ago. His smoking use included cigarettes. He quit after 16.00 years of use. He has never used smokeless tobacco. He reports current alcohol use of about 4.0 standard drinks of alcohol per week. He reports that he does not use drugs. Family History:  Reviewed. family history includes Cancer in his father; Diabetes in his brother; Mental Illness in his mother; Other in his sister; Sleep Apnea in his brother. Review of System:  · Constitutional: Positive for fatigue (on going).  Negative for fever, night sweats, chills, weight changes, or weakness  · Skin: Negative for rash, dry skin, pruritus, bruising, bleeding, blood clots, or changes in skin pigment  · HEENT: Negative for vision changes, ringing in the ears, sore throat, dysphagia, or swollen lymph nodes  · Respiratory: Positive for SOB/FALCON  · Cardiovascular: Reviewed in HPI  · Gastrointestinal: Negative for abdominal pain, N/V/D, constipation, or black/tarry stools  · Genito-Urinary: Negative for dysuria, incontinence, urgency, or hematuria  · Musculoskeletal: Negative for joint swelling, muscle pain, or injuries  · Neurological/Psych: Negative for confusion, seizures, dizziness, headaches, balance issues or TIA-like symptoms. No anxiety, depression, or insomnia    Physical Examination:  Vitals:    08/05/20 1346   BP: 118/80   Pulse: 74      Wt Readings from Last 3 Encounters:   08/05/20 233 lb (105.7 kg)   07/21/20 234 lb (106.1 kg)   07/08/20 230 lb (104.3 kg)     Constitutional: Cooperative and in no apparent distress, and appears well nourished  Skin: Warm and pink; no pallor, cyanosis, bruising, or clubbing  HEENT: Symmetric and normocephalic. PERRL, EOM intact. Conjunctiva pink with clear sclera. Mucus membranes pink and moist. Teeth intact. Thyroid smooth without nodules or goiter  Respiratory: Respirations symmetric and unlabored. Lungs clear to auscultation bilaterally, no wheezing, rhonchi, or crackles  Cardiovascular:  Regular rate and rhythm. S1/S2 present without murmurs, rubs, or gallops. Peripheral pulses 2+, capillary refill < 3 seconds. No elevation of JVP. No peripheral edema  Gastrointestinal: Abdomen soft and round. Bowel sounds normoactive in all quadrants without tenderness or masses. + Obese  Musculoskeletal: Bilateral upper and lower extremity strength 5/5 with full ROM. Neurological/Psych: Awake and orientated to person, place and time. Calm affect, appropriate mood. Pertinent labs, diagnostic, device, and imaging results reviewed as a part of this visit    LABS    CBC:   Lab Results   Component Value Date    WBC 7.4 07/03/2020    HGB 13.7 07/03/2020    HCT 40.4 (L) 07/03/2020    MCV 94.2 07/03/2020     07/03/2020     BMP:   Lab Results   Component Value Date    CREATININE 1.5 (H) 07/03/2020    BUN 19 07/03/2020     07/03/2020    K 4.0 07/03/2020     07/03/2020    CO2 21 07/03/2020     Estimated Creatinine Clearance: 49 mL/min (A) (based on SCr of 1.5 mg/dL (H)).      Thyroid:   Lab Results   Component Value Date    TSH 1.36 08/26/2019     Lipid Panel:   Lab Results   Component Value Date    CHOL 112 07/03/2020    HDL 38 07/03/2020 HDL 50 2011    TRIG 173 2020     LFTs:  Lab Results   Component Value Date    ALT 14 2020    AST 17 2020    ALKPHOS 83 2020    BILITOT 0.3 2020     Coags:   Lab Results   Component Value Date    PROTIME 10.7 2020    INR 0.92 2020    APTT 28.5 2019       EC2020  - NSR with RBBB and LAFB with frequent PVCs    Echo:    A bubble study was performed and fails to show evidence of shunting. Left ventricular systolic function is normal with ejection fraction   estimated at 55-60 %. No regional wall motion abnormalities are noted. There is mild left ventricular hypertrophy. Left ventricle size is normal.   Mild mitral and tricuspid regurgitation. Grade I diastolic dysfunction with normal LV filling pressures. Echo:    -Arrhythmia noted. -Normal left ventricle size and systolic function with an estimated ejection   fraction of 55%. There is concentric left ventricular hypertrophy. Grade I   diastolic dysfunction with elevated LV filling pressures. -Mitral annular calcification is present. -Mild mitral regurgitation.   -Aortic valve appears sclerotic but opens adequately. -Mild tricuspid regurgitation with PASP of 34 mmHg. GXT: 10/19  No EKG evidence for ischemia with lexiscan       Normal LV size and systolic function.       Myocardial perfusion imaging with small area of decreased uptake in the    inferoapical wall with stress and rest consistent with scar in the territory    typical of the RCA, Cx or LAD arteries.       No significant reversible ischemia     Cath: 10/14/19   LM       Normal              LAD     20% mid              Cx        Normal              RCA     Normal, nondominant              LVG     Not performed              EDP     12 mmHg     MCOT:  -19  Sinus rhythm with frequent PVC's  PVC burden 25%  PAC burden 3%  High   Low HR 42  Average HR 69    Assessment:    1.  Symptomatic PVC's  - Holter monitor reviewed: Patient had high PVC burden 25% (8/19)    - Discussed PVCs, risks, therapies and alternatives with patient. All questions were answered  - Treatment options including medical therapy with antiarrhythmic drugs or ablation discussed   ~ Limited medical therapies due to low-normal resting heart rate   ~ His symptoms of fatigue/SOB appear to be correlated to his PVC     - The risks, benefits and alternatives of the ablation procedure were discussed with the patient. The risks including, but not limited to, the risks of bleeding, infection, radiation exposure, injury to vascular, cardiac and surrounding structures (including pneumothorax), stroke, cardiac perforation, tamponade, need for emergent open heart surgery, need for pacemaker implantation, Injury to the phrenic nerve, injury to the esophagus, myocardial infarction and death were discussed in detail. Re-iterated to pt that his PVC origin is concerning due its location and need for possible PPM.    ------> The patient opted to proceed with the PVC ablation with Dr. Monica Cunningham    2. Implantable Loop Recorder  - S/p ILR insertion on 10/19  -The CIED was interrogated and programmed and I supervised and reviewed all the data. All findings and changes are in device interrogation sheat and reflect my personal interpretation and changes and is scanned to Epic  - Device shows: NSR. Symptoms with PVC  - Follow up with device clinic as scheduled    3. HTN  - Controlled, on low side: Goal <130/80  - Continue current medications  - Encouraged to monitor and log BP readings at home, then bring log to next visit  - Discussed importance of low sodium diet, weight control and exercise    4. CAD  - Mild LAD lesion per [facility] (10/19)  - Stable  - No complaints of angina  - Continue ASA, ARB, and statin    5.  Chronic diastolic heart failure (NYHA Class II)  - Appears compensated   ~ EF 55% per echo; Grade I DD (7/19)  - Continue with HCTZ 25 mg QD, losartan 100 mg QD  ~ No BB due to borderline HR  - Aggressive medical therapy with risk factor modification  - Discussed with patient importance of monitoring weight, low sodium diet and fluid restriction    6. Hx of Stroke   - On ASA/Plavix/statin   - Followed by neurology    7. AUDIE  - Stable: Uses CPAP  - Encourage to use CPAP to prevent long term effects of untreated AUDIE    8. Obesity  - Body mass index is 35.43 kg/m². - Complicating assessment and treatment. Placing patient at risk for multiple co-morbidities as well as early death and contributing to the patient's presentation  - Discussed weight loss and patient was encouraged to reduce calorie intake and increase exercise    Plan:  1. Our  will be contacting you from [phone] to schedule your procedure. 2. Stay adequately hydrated    F/U: Follow-up with Dr. Taylor Garland in 3 months  -Follow up with device clinic as scheduled  -Call [address] at [phone] with any questions    Diet & Exercise:   The patient is counseled to follow a low salt diet to assure blood pressure remains controlled for cardiovascular risk factor modification   The patient is counseled to avoid excess caffeine, and energy drinks as this may exacerbated ectopy and arrhythmia   The patient is counseled to lose weight to control cardiovascular risk factors   Exercise program discussed: To improve overall cardiovascular health, the patient is instructed to increase cardiovascular related activities with a goal of 150 min/week of moderate level activity or 10,000 steps per day. Encouraged to perform as much activity as tolerated    Quality Metrics  1. Tobacco Cessation Counseling: N/A  2. Retake of BP if >140/90: N/A  3. Documentation to PCP: Note sent to PCP office visit  4. CAD patient on anti-platelet: Yes  5. CAD patient on STATIN therapy: Yes   6.    Patient with history of CHF and atrial fibrillation on anticoagulation: N/A      I have addressed the patient's cardiac risk factors

## 2020-07-15 NOTE — TELEPHONE ENCOUNTER
Medication Refill    Medication needing refilled:  clopidogrel (PLAVIX) 75 MG tablet         Doseage of the medication:75 mg    How are you taking this medication (QD, BID, TID, QID, PRN): take tablet daily    30 or 90 day supply called in: 80    Which Pharmacy are we sending the medication to?:     [address] [facility] [address] [place], [place] [id number] - F [phone]

## 2020-07-21 ENCOUNTER — OFFICE VISIT (OUTPATIENT)
Dept: INTERNAL MEDICINE CLINIC | Age: 75
End: 2020-07-21
Payer: MEDICARE

## 2020-07-21 VITALS
OXYGEN SATURATION: 97 % | WEIGHT: 234 LBS | TEMPERATURE: 97.1 F | RESPIRATION RATE: 16 BRPM | BODY MASS INDEX: 35.58 KG/M2 | SYSTOLIC BLOOD PRESSURE: 120 MMHG | DIASTOLIC BLOOD PRESSURE: 60 MMHG | HEART RATE: 42 BPM

## 2020-07-21 PROBLEM — I49.8 VENTRICULAR BIGEMINY: Status: ACTIVE | Noted: 2020-07-21

## 2020-07-21 PROCEDURE — G8427 DOCREV CUR MEDS BY ELIG CLIN: HCPCS | Performed by: INTERNAL MEDICINE

## 2020-07-21 PROCEDURE — G8417 CALC BMI ABV UP PARAM F/U: HCPCS | Performed by: INTERNAL MEDICINE

## 2020-07-21 PROCEDURE — 4040F PNEUMOC VAC/ADMIN/RCVD: CPT | Performed by: INTERNAL MEDICINE

## 2020-07-21 PROCEDURE — 1123F ACP DISCUSS/DSCN MKR DOCD: CPT | Performed by: INTERNAL MEDICINE

## 2020-07-21 PROCEDURE — 99214 OFFICE O/P EST MOD 30 MIN: CPT | Performed by: INTERNAL MEDICINE

## 2020-07-21 PROCEDURE — 1111F DSCHRG MED/CURRENT MED MERGE: CPT | Performed by: INTERNAL MEDICINE

## 2020-07-21 PROCEDURE — 1036F TOBACCO NON-USER: CPT | Performed by: INTERNAL MEDICINE

## 2020-07-21 NOTE — PATIENT INSTRUCTIONS
To Do List:    #1 Update diabetes eye exam.  Schedule with your eye doctor.       #2  Obtain LAB tests    #3 Follow up with Dr. Autumn Moeller to discuss your heart rhythm

## 2020-07-21 NOTE — PROGRESS NOTES
[facility]) Physicians  Internal Medicine  Patient Encounter  Cristina Gardiner D.O., [facility]        Chief Complaint   Patient presents with    Hypotension     Seen at ER , follow up , EKG abmormal.  Has not seen cardiologist.       HPI: 66 y.o. male seen today with concerns about low blood pressure. Patient is status post hospitalization 7/2/2020-7/3/2020. He had presented to the ER with a complaint of generalized weakness. Patient had checked his pulse rate at 37. Patient has an internal loop recorder present. This was placed back in July 2019 when he had symptoms suggestive of a TIA. The heart rate of 37 was checked with a pulse oximeter. It is conceivable that the pulse oximeter was not picking up his true heart rate if he was having a lot of PVCs. In the ER his heart rate was actually 77 but he had ventricular bigeminy. His loop recorder was interrogated and he was subsequently discharged. The interrogation is not available. He was seen in the EP clinic on 7/8/2020. The office note is reviewed. His interrogation of the loop recorder did show 2 episodes of bradycardia in the 30s on July 7 which occurred while he was sleeping. An echocardiogram was ordered and it was noted that he will need radiofrequency ablation. The echo 7/15/2020 is reviewed--  Summary   A bubble study was performed and fails to show evidence of shunting. Left ventricular systolic function is normal with ejection fraction   estimated at 55-60 %. No regional wall motion abnormalities are noted. There is mild left ventricular hypertrophy. Left ventricle size is normal.   Mild mitral and tricuspid regurgitation. Grade I diastolic dysfunction with normal LV filling pressures. Pt is also requesting a check up for other medical problems.       Diabetes Mellitus Type II, Follow-up--   Lab Results   Component Value Date    LABA1C 6.1 01/10/2020     Lab Results   Component Value Date    .4 01/10/2020      Patient has been diagnosed with diabetes by virtue of several A1c levels above 6.5%. he denies hypoglycemia. He denies excessive thirst or frequent urination. AM sugars--  108-120  Last Eye Exam: 6/4/2019, OVERDUE  U.Microalbumin/Cr: 2/14/2020  Pt is on an ARB. Complications-- none  Insulin Treated? No.    ASA: Yes. Tobacco: No   Foot exam--5/8/2019     HTN--patient feels his blood pressure is well controlled. He has not experienced any similar symptoms like he did in July when diagnosed with a TIA. He denies any episodes of unilateral weakness or paresthesias. He denies any headaches, dizziness, lightheadedness, or syncope.     Hyperlipidemia--   Lab Results   Component Value Date    LDLCALC 39 07/03/2020      Patient denies any new side effects from his statin. He is on Pravastatin 80 mg.       GERD--Patient denies any problems with heartburn or dysphagia. He is currently on omeprazole. H2 blockers have been ineffective. Lab Results   Component Value Date    MG 2.10 07/02/2020          CAD--Previous Hx----> This was diagnosed via a heart catheterization 10/14/2019. Last summer he went to the emergency room and was diagnosed with a TIA. he denies CP, SOB, palpitations.       Severe sleep apnea-- He states he is wearing his CPAP.         Past Medical History:   Diagnosis Date    BPH     Colon polyps     DDD (degenerative disc disease), lumbar     Diverticulosis     Gallstone pancreatitis 09/05/2017    GERD (gastroesophageal reflux disease)     Hyperlipidemia     Hypertension     Impaired fasting blood sugar     Kidney stones 11/10    Lumbar spondylosis     Mild CAD 10/14/2019    Dr. Vamshi Crisostomo, [facility]    Mild CAD 11/13/2019    OAB (overactive bladder)     AUDIE (obstructive sleep apnea) 6/4/2013    Osteoarthritis     Osteoarthritis of right knee     Pneumonia     pneumonia    RBBB 12/19/2016    Renal cyst     Retinal vasculitis     Retinal vasculitis     Right hemiparesis ([facility] [address] 7/25/2019    Right ureteral stone     Type 2 diabetes mellitus without complication, without long-term current use of insulin (HCC)     diet controlled    Type 2 diabetes mellitus without complication, without long-term current use of insulin (HCC)          MEDICATIONS:  Medication Sig   clopidogrel (PLAVIX) 75 MG tablet Take 1 tablet by mouth daily   hydrochlorothiazide (HYDRODIURIL) 25 MG tablet Take 1 tablet by mouth daily   mometasone (NASONEX) 50 MCG/ACT nasal spray 2 sprays by Each Nostril route daily  Patient taking differently: 2 sprays by Each Nostril route daily as needed    pravastatin (PRAVACHOL) 80 MG tablet Take 1 tablet by mouth every evening   ezetimibe (ZETIA) 10 MG tablet Take 1 tablet by mouth daily   amLODIPine (NORVASC) 5 MG tablet Take 1 tablet by mouth daily   losartan (COZAAR) 100 MG tablet Take 1 tablet by mouth daily   b complex vitamins capsule Take 1 capsule by mouth daily   omeprazole (PRILOSEC) 40 MG delayed release capsule Take 1 capsule by mouth daily   solifenacin (VESICARE) 10 MG tablet Take 10 mg by mouth daily    tamsulosin (FLOMAX) 0.4 MG capsule Take 1 capsule by mouth daily   aspirin 81 MG tablet Take 81 mg by mouth daily   Cholecalciferol (VITAMIN D) 2000 UNITS CAPS capsule Take 1 capsule by mouth daily    Glucosamine HCl-MSM (GLUCOSAMINE-MSM PO) Take 1 tablet by mouth 2 times daily            Review of Systems - As per HPI      OBJECTIVE:  Vitals:    07/21/20 1505   BP: 120/60   Site: Right Upper Arm   Position: Sitting   Pulse: (!) 42   Resp: 16   Temp: 97.1 °F (36.2 °C)   TempSrc: Oral   SpO2: 97%   Weight: 234 lb (106.1 kg)     Body mass index is 35.58 kg/m².      Wt Readings from Last 3 Encounters:   07/21/20 234 lb (106.1 kg)   07/08/20 230 lb (104.3 kg)   07/03/20 235 lb 10.8 oz (106.9 kg)     BP Readings from Last 3 Encounters:   07/21/20 120/60   07/08/20 102/70   07/03/20 136/75      GEN: NAD, A&O, Non-toxic  HEENT: NC/AT, DAVION, EOMI, Oral cavity Clear,  TM's NL, Nasal cavity clear.    NECK: Supple. No thyromegaly. No JVD  LYMPH: No C/SC nodes. CV: Reg rhythm with frequent ectopy.  + Bradycardia with ectopy. PULM: CTA  EXT: + Trace LE edema. GI: Abdomen is soft, NT, BS+, No hepatomegaly. No masses. NEURO: No focal or lateralizing deficits. Monofilament testing intact both feet. Vibration sensation intact in both feet. VASC:  No carotid bruits. Pulses symmetric  SKIN:  No rashes or lesions of concern. Feet normal color and temperature, no large calluses, ulcers or wounds       ASSESSMENT/PLAN:    1. Type 2 diabetes mellitus without complication, without long-term current use of insulin ([facility])  Condition stability is uncertain but seems under good control based on his home blood sugar readings  Recommended he update his diabetic eye exam  Diabetic foot exam today  Continue Lifestyle modification including low calorie diet focusing on Low fat/low cholesterol and low carbohydrate intake, along with  increasing cardiovascular (aerobic) exercise. Patient counseled  -  DIABETES FOOT EXAM  - Hemoglobin A1C; Future  - TSH without Reflex; Future    2. Need for diphtheria-tetanus-pertussis (Tdap) vaccine    - Tetanus-Diphth-Acell Pertussis (BOOSTRIX) 5-2.5-18.5 LF-MCG/0.5 injection; Inject 0.5 mLs into the muscle once for 1 dose  Dispense: 1 vial; Refill: 0    3. Benign essential HTN  Blood pressure is well controlled. Continue current medicines cautiously. He will follow-up with cardiology with regards to his arrhythmia.  - TSH without Reflex; Future    4. Bradycardia  Condition remains a problem. Monitor for lightheadedness and syncope  Follow-up with electrophysiology    5. Ventricular bigeminy  As above    6. Dyslipidemia  Condition is well controlled  Continue statin therapy as part of his cardiovascular disease risk reduction strategy. 7. Mild CAD  Findings are based on angiogram back in October 2019. Continue cardiovascular risk reduction efforts.   Continue aspirin, statin. He is also on Plavix for stroke risk reduction    8. Severe sleep apnea  Condition is well controlled  Continue CPAP    9. Gastroesophageal reflux disease without esophagitis  Condition is well controlled  Continue Prilosec  Continue to monitor magnesium yearly        Discussed medications with patient who voiced understanding of their use, indication and potential side effects. Pt also understands the above recommendations. All questions answered. This note was generated completely or in part utilizing Dragon dictation speech recognition software. Occasionally, words are mistranscribed and despite editing, the text may contain inaccuracies due to incorrect word recognition.   If further clarification is needed please contact the office at [phone]       Electronically signed    Sammy Mccann D.O.

## 2020-07-22 ENCOUNTER — TELEPHONE (OUTPATIENT)
Dept: CARDIOLOGY CLINIC | Age: 75
End: 2020-07-22

## 2020-07-22 NOTE — TELEPHONE ENCOUNTER
Pt states he seen his PCP Dr Erica Berry and told pt due to his echo results he should see mxa as soon as possible.  Please call pt today 7/22/20 to advise

## 2020-07-22 NOTE — TELEPHONE ENCOUNTER
Called and reviewed echo results. Patient denies lightheadedness, dizziness, chest pain, palpitations, orthopnea, edema, presyncope or syncope. Keep OV on 8/5 as scheduled to discuss plan, no need for urgent visit.     Nikolay Gonzalez, FAHAD-CNP

## 2020-08-05 ENCOUNTER — OFFICE VISIT (OUTPATIENT)
Dept: CARDIOLOGY CLINIC | Age: 75
End: 2020-08-05
Payer: MEDICARE

## 2020-08-05 ENCOUNTER — NURSE ONLY (OUTPATIENT)
Dept: CARDIOLOGY CLINIC | Age: 75
End: 2020-08-05
Payer: MEDICARE

## 2020-08-05 VITALS
HEIGHT: 68 IN | DIASTOLIC BLOOD PRESSURE: 80 MMHG | SYSTOLIC BLOOD PRESSURE: 118 MMHG | HEART RATE: 74 BPM | BODY MASS INDEX: 35.31 KG/M2 | WEIGHT: 233 LBS

## 2020-08-05 PROCEDURE — 99214 OFFICE O/P EST MOD 30 MIN: CPT | Performed by: NURSE PRACTITIONER

## 2020-08-05 PROCEDURE — 4040F PNEUMOC VAC/ADMIN/RCVD: CPT | Performed by: NURSE PRACTITIONER

## 2020-08-05 PROCEDURE — 1123F ACP DISCUSS/DSCN MKR DOCD: CPT | Performed by: NURSE PRACTITIONER

## 2020-08-05 PROCEDURE — 93000 ELECTROCARDIOGRAM COMPLETE: CPT | Performed by: NURSE PRACTITIONER

## 2020-08-05 PROCEDURE — 93291 INTERROG DEV EVAL SCRMS IP: CPT | Performed by: INTERNAL MEDICINE

## 2020-08-05 PROCEDURE — 1036F TOBACCO NON-USER: CPT | Performed by: NURSE PRACTITIONER

## 2020-08-05 PROCEDURE — G8427 DOCREV CUR MEDS BY ELIG CLIN: HCPCS | Performed by: NURSE PRACTITIONER

## 2020-08-05 PROCEDURE — G8417 CALC BMI ABV UP PARAM F/U: HCPCS | Performed by: NURSE PRACTITIONER

## 2020-08-05 NOTE — PATIENT INSTRUCTIONS
Plan:  1. Our  will be contacting you from [phone] to schedule your procedure.   2. Stay adequately hydrated

## 2020-08-05 NOTE — PROGRESS NOTES
Patient comes in for interrogation of their implanted loop recorder. Interrogation shows 6 symptom recordings and 1 AF episode that appear to be SR with ectopy. Implanted for Stroke. Patient remains on Plavix. Please see interrogation for more detail. Patient will see NPSR and we will continue to follow the Patient remotely.

## 2020-08-05 NOTE — LETTER
[facility]  EP Procedure Sheet    8/5/2020  Sincere Nielsen  1945    EP Procedures     Pacemaker implant (single/dual) XXX PVC ablation    ICD implant (single/dual)  Atrial flutter ablation (GUI Y/N)    Biv implant ICD  Cryoablation    Biv implant PPM  Atrial fibrillation ablation (GUI Yes)    Generator Change (PPM/ICD/BiV)  SVT ablation    Lead revision (RV/LA/RA) (<1 month)  VT ablation      Lead extraction +/- upgrade (BiV/PPM/ICD)  VT Ischemic/ non-ischemic    Loop implant/ removal  VT RVOT    Cardioversion  VT Left sided    GUI  AVN ablation     Equipment     Medtronic   FUNMILAYO Mapping System    [place]  [id number] [address] [employer]  CryoAblation    Biotronik  Laser Lead Extraction    Special Equipment       EP Procedures Scheduling Request    # hours Requested     Specific Day    Anesthesia Yes   CT surgery backup    Location [facility]     Pre-Procedure Labs / Imaging     PT/INR  Type & cross    CBC  Units PRBC    BMP/Mg  Units FFP    Venogram  CXR    Echo  Cardiac CTA for Pulmonary vein mapping     NP INITIALS: SR  Patient Instructions    Do not eat or drink after midnight the night prior to procedure    Dx:Symptomatic PVC    You will be contacted by scheduling in 7-10 business days to schedule your procedure

## 2020-08-06 ENCOUNTER — HOSPITAL ENCOUNTER (OUTPATIENT)
Age: 75
Discharge: HOME | End: 2020-08-06
Payer: MEDICARE

## 2020-08-06 LAB — TSH SERPL DL<=0.05 MIU/L-ACNC: 1 UIU/ML (ref 0.27–4.2)

## 2020-08-06 PROCEDURE — 84443 ASSAY THYROID STIM HORMONE: CPT

## 2020-08-06 PROCEDURE — 83036 HEMOGLOBIN GLYCOSYLATED A1C: CPT

## 2020-08-06 PROCEDURE — 36415 COLL VENOUS BLD VENIPUNCTURE: CPT

## 2020-08-07 LAB
ESTIMATED AVERAGE GLUCOSE: 131.2 MG/DL
HBA1C MFR BLD: 6.2 %

## 2020-08-11 ENCOUNTER — OFFICE VISIT (OUTPATIENT)
Dept: PULMONOLOGY | Age: 75
End: 2020-08-11
Payer: MEDICARE

## 2020-08-11 VITALS
HEART RATE: 71 BPM | WEIGHT: 229 LBS | HEIGHT: 68 IN | DIASTOLIC BLOOD PRESSURE: 79 MMHG | BODY MASS INDEX: 34.71 KG/M2 | RESPIRATION RATE: 18 BRPM | OXYGEN SATURATION: 98 % | SYSTOLIC BLOOD PRESSURE: 116 MMHG

## 2020-08-11 PROCEDURE — G8417 CALC BMI ABV UP PARAM F/U: HCPCS | Performed by: INTERNAL MEDICINE

## 2020-08-11 PROCEDURE — 99204 OFFICE O/P NEW MOD 45 MIN: CPT | Performed by: INTERNAL MEDICINE

## 2020-08-11 PROCEDURE — G8427 DOCREV CUR MEDS BY ELIG CLIN: HCPCS | Performed by: INTERNAL MEDICINE

## 2020-08-11 ASSESSMENT — ENCOUNTER SYMPTOMS
ABDOMINAL DISTENTION: 0
BLOOD IN STOOL: 0
ABDOMINAL PAIN: 0
RHINORRHEA: 0
BACK PAIN: 0
CONSTIPATION: 0
COUGH: 1
STRIDOR: 0
WHEEZING: 0
SHORTNESS OF BREATH: 1
SINUS PRESSURE: 0
SORE THROAT: 0
ANAL BLEEDING: 0
APNEA: 0
VOICE CHANGE: 0
CHOKING: 0
DIARRHEA: 0
CHEST TIGHTNESS: 0

## 2020-08-12 LAB
CATARACTS: NEGATIVE
DIABETIC RETINOPATHY: NEGATIVE
GLAUCOMA: NEGATIVE
INTRAOCULAR PRESSURE EYE: NORMAL
VISUAL ACUITY DISTANCE LEFT EYE: NORMAL
VISUAL ACUITY DISTANCE RIGHT EYE: NORMAL

## 2020-08-31 ENCOUNTER — TELEPHONE (OUTPATIENT)
Dept: INTERNAL MEDICINE CLINIC | Age: 75
End: 2020-08-31

## 2020-08-31 RX ORDER — OMEPRAZOLE 40 MG/1
40 CAPSULE, DELAYED RELEASE ORAL DAILY
Qty: 90 CAPSULE | Refills: 3 | Status: SHIPPED | OUTPATIENT
Start: 2020-08-31 | End: 2022-06-16 | Stop reason: SDUPTHER

## 2020-08-31 NOTE — TELEPHONE ENCOUNTER
Patient requesting refill of Prilosec be sent to [address] [place] [address].  Please call patient when it gets sent in so he knows when to pick it up

## 2020-09-08 ENCOUNTER — NURSE ONLY (OUTPATIENT)
Dept: CARDIOLOGY CLINIC | Age: 75
End: 2020-09-08
Payer: MEDICARE

## 2020-09-08 PROCEDURE — G2066 INTER DEVC REMOTE 30D: HCPCS | Performed by: INTERNAL MEDICINE

## 2020-09-08 PROCEDURE — 93298 REM INTERROG DEV EVAL SCRMS: CPT | Performed by: INTERNAL MEDICINE

## 2020-09-08 NOTE — LETTER
[address] [facility] [phone]  [address] [phone]    Pacemaker/Defibrillator Clinic          09/08/20        [address] [place], [place] [id number]        Dear Mela Bae    This letter is to inform you that we received the transmission from your monitor at home that checks your implanted heart device. The next date your monitor will automatically transmit will be 10-12-20. If your report needs attention we will notify you. Your device and monitor are wireless and most transmit cellularly, but please periodically check your monitor is still plugged in to the electrical outlet. If you still use the telephone land line to send please ensure the connection to the phone misty is secure. This will help to ensure successful automatic transmissions in the future. Also, the monitor needs to be close to you while sleeping at night. Please be aware that the remote device transmission sites are periodically monitored only during regular business hours during which simultaneous in-office device clinics are being run. If your transmission requires attention, we will contact you as soon as possible. Thank you.             [facility]

## 2020-09-15 ENCOUNTER — OFFICE VISIT (OUTPATIENT)
Dept: PRIMARY CARE CLINIC | Age: 75
End: 2020-09-15
Payer: MEDICARE

## 2020-09-15 ENCOUNTER — TELEPHONE (OUTPATIENT)
Dept: CARDIOLOGY CLINIC | Age: 75
End: 2020-09-15

## 2020-09-15 PROCEDURE — G8428 CUR MEDS NOT DOCUMENT: HCPCS | Performed by: NURSE PRACTITIONER

## 2020-09-15 PROCEDURE — G8417 CALC BMI ABV UP PARAM F/U: HCPCS | Performed by: NURSE PRACTITIONER

## 2020-09-15 PROCEDURE — 99211 OFF/OP EST MAY X REQ PHY/QHP: CPT | Performed by: NURSE PRACTITIONER

## 2020-09-15 NOTE — TELEPHONE ENCOUNTER
Called patient's wife to confirm her appointment with Vicki Robertson and she said that Keesha eBtancourt has an appointment on 9/16 and needs this appointment prior to his ablation. Appointment was canceled. Please call patient and advise.  [email or web address]

## 2020-09-15 NOTE — PROGRESS NOTES
[facility]   Electrophysiology  LELA Mai  Attending EP: Dr. Aaliyah Finch    Date: 9/16/2020  I had the privilege of visiting Meghan Silva in the office. Chief Complaint:   Chief Complaint   Patient presents with    Follow-up    Congestive Heart Failure    Transient Ischemic Attack     History of Present Illness: History obtained from patient and medical record. Meghan Silva is 66 y.o. male with a past medical history of HTN, HLD, CVA, DM, CHF, AUDIE, and RBBB. In July of 2019, he was admitted for fatigue and right sided weakness. He was diagnosed with TIA and started on ASA/plavix. S/p ILR for cryptogenic stroke (10/14/19)     -Interval history: Today, Meghan Silva is being seen for follow up. His wife is present for the visit. He is in sinus with frequent PVCs on EKG today. His device interrogation shows bradycardia at night. Pt is scheduled for a PVC ablation with Dr. Aaliyah Finch in a week. He and his wife have numerous questions about his procedure. We discussed the risks, benefits, and alternatives to the procedure. His BP is on the low side, 94/66. Pt/wife report similar low readings at home. He continues to be fatigued and SOB, particularly with daily activity. He has plans for PFTs next week. Will monitor for improvement in symptoms following his PVC ablation as his burden is quite high. He is compliant with his CPAP therapy at night. Denies having chest pain, palpitations, shortness of breath, orthopnea/PND, cough, or dizziness at the time of this visit. With regard to medication therapy the patient has been compliant with prescribed regimen. They have tolerated therapy to date. Allergies:  No Known Allergies    Home Medications:  Prior to Visit Medications    Medication Sig Taking?  Authorizing Provider   tamsulosin (FLOMAX) 0.4 MG capsule Take 1 capsule by mouth 2 times daily Yes FAHAD Mai CNP   omeprazole (PRILOSEC) 40 MG delayed release capsule Take 1 capsule by mouth daily Yes Scotty Stewart DO   clopidogrel (PLAVIX) 75 MG tablet Take 1 tablet by mouth daily Yes FAHAD Burnham CNP   hydrochlorothiazide (HYDRODIURIL) 25 MG tablet Take 1 tablet by mouth daily Yes Pamella Hernandez MD   mometasone (NASONEX) 50 MCG/ACT nasal spray 2 sprays by Each Nostril route daily  Patient taking differently: 2 sprays by Each Nostril route daily as needed  Yes Scotty Stewart DO   pravastatin (PRAVACHOL) 80 MG tablet Take 1 tablet by mouth every evening Yes Scotty Stewart DO   ezetimibe (ZETIA) 10 MG tablet Take 1 tablet by mouth daily Yes Scotty Stewart DO   amLODIPine (NORVASC) 5 MG tablet Take 1 tablet by mouth daily Yes Scotty Stewart DO   losartan (COZAAR) 100 MG tablet Take 1 tablet by mouth daily Yes Scotty Stewart DO   b complex vitamins capsule Take 1 capsule by mouth daily Yes Historical Provider, MD   solifenacin (VESICARE) 10 MG tablet Take 10 mg by mouth daily  Yes Historical Provider, MD   aspirin 81 MG tablet Take 81 mg by mouth daily Yes Historical Provider, MD   Cholecalciferol (VITAMIN D) 2000 UNITS CAPS capsule Take 1 capsule by mouth daily  Yes Historical Provider, MD   Glucosamine HCl-MSM (GLUCOSAMINE-MSM PO) Take 1 tablet by mouth 2 times daily  Yes Historical Provider, MD      Past Medical History:  Past Medical History:   Diagnosis Date    BPH     Colon polyps     DDD (degenerative disc disease), lumbar     Diverticulosis     Gallstone pancreatitis 09/05/2017    GERD (gastroesophageal reflux disease)     Hyperlipidemia     Hypertension     Impaired fasting blood sugar     Kidney stones 11/10    Lumbar spondylosis     Mild CAD 10/14/2019    Dr. Ginny Bynum, [facility]    Mild CAD 11/13/2019    OAB (overactive bladder)     AUDIE (obstructive sleep apnea) 6/4/2013    Osteoarthritis     Osteoarthritis of right knee     Pneumonia     pneumonia    RBBB 12/19/2016    Renal cyst     Retinal vasculitis     Retinal vasculitis     Right hemiparesis ([facility] 7/25/2019    Right ureteral stone     Type 2 diabetes mellitus without complication, without long-term current use of insulin (HCC)     diet controlled    Type 2 diabetes mellitus without complication, without long-term current use of insulin ([facility]      Past Surgical History:    has a past surgical history that includes Appendectomy; Tonsillectomy; Colonoscopy (7/2008); Cardiac catheterization (3/23/2012); right colectomy (3/28/2012); Bone Resection (Left); Cystoscopy (12/2/2014); hernia repair; Cholecystectomy, laparoscopic (09/07/2017); Cataract removal with implant (Left, 11/01/2017); Cataract removal with implant (Right, 10/2017); Cystoscopy (N/A, 12/13/2018); Cystoscopy (Left, 1/2/2019); Insertable Cardiac Monitor; and Colonoscopy (N/A, 1/15/2020). Social History:  Reviewed. reports that he quit smoking about 43 years ago. His smoking use included cigarettes. He quit after 16.00 years of use. He has never used smokeless tobacco. He reports current alcohol use of about 4.0 standard drinks of alcohol per week. He reports that he does not use drugs. Family History:  Reviewed. family history includes Cancer in his father; Diabetes in his brother; Mental Illness in his mother; Other in his sister; Sleep Apnea in his brother. Review of System:  · Constitutional: Positive for fatigue (on going).  Negative for fever, night sweats, chills, weight changes, or weakness  · Skin: Negative for rash, dry skin, pruritus, bruising, bleeding, blood clots, or changes in skin pigment  · HEENT: Negative for vision changes, ringing in the ears, sore throat, dysphagia, or swollen lymph nodes  · Respiratory: Positive for SOB/FALCON  · Cardiovascular: Reviewed in HPI  · Gastrointestinal: Negative for abdominal pain, N/V/D, constipation, or black/tarry stools  · Genito-Urinary: Negative for dysuria, incontinence, urgency, or hematuria  · Musculoskeletal: Negative for joint swelling, muscle pain, or Results   Component Value Date    CHOL 112 2020    HDL 38 2020    HDL 50 2011    TRIG 173 2020     LFTs:  Lab Results   Component Value Date    ALT 14 2020    AST 17 2020    ALKPHOS 83 2020    BILITOT 0.3 2020     Coags:   Lab Results   Component Value Date    PROTIME 10.7 2020    INR 0.92 2020    APTT 28.5 2019       EC2020  - NSR with RBBB and LAFB with frequent PVCs    Echo:    A bubble study was performed and fails to show evidence of shunting. Left ventricular systolic function is normal with ejection fraction   estimated at 55-60 %. No regional wall motion abnormalities are noted. There is mild left ventricular hypertrophy. Left ventricle size is normal.   Mild mitral and tricuspid regurgitation. Grade I diastolic dysfunction with normal LV filling pressures. Echo:    -Arrhythmia noted. -Normal left ventricle size and systolic function with an estimated ejection   fraction of 55%. There is concentric left ventricular hypertrophy. Grade I   diastolic dysfunction with elevated LV filling pressures. -Mitral annular calcification is present. -Mild mitral regurgitation.   -Aortic valve appears sclerotic but opens adequately. -Mild tricuspid regurgitation with PASP of 34 mmHg.     GXT: 10/19  No EKG evidence for ischemia with lexiscan       Normal LV size and systolic function.       Myocardial perfusion imaging with small area of decreased uptake in the    inferoapical wall with stress and rest consistent with scar in the territory    typical of the RCA, Cx or LAD arteries.       No significant reversible ischemia     Cath: 10/14/19   LM       Normal              LAD     20% mid              Cx        Normal              RCA     Normal, nondominant              LVG     Not performed              EDP     12 mmHg     MCOT:  -19  Sinus rhythm with frequent PVC's  PVC burden 25%  PAC burden 3%  High   Low HR 42  Average HR 69    Assessment:    1. Symptomatic PVC's  - Holter monitor reviewed: Patient had high PVC burden 25% (8/19)    - Discussed PVCs, risks, therapies and alternatives with patient. All questions were answered  - Treatment options including medical therapy with antiarrhythmic drugs or ablation discussed   ~ Limited medical therapies due to low-normal resting heart rate   ~ His symptoms of fatigue/SOB may be correlated to his PVC     - The risks, benefits and alternatives of the ablation procedure were discussed with the patient. The risks including, but not limited to, the risks of bleeding, infection, radiation exposure, injury to vascular, cardiac and surrounding structures (including pneumothorax), stroke, cardiac perforation, tamponade, need for emergent open heart surgery, need for pacemaker implantation, Injury to the phrenic nerve, injury to the esophagus, myocardial infarction and death were discussed in detail. Re-iterated to pt that his PVC origin is concerning due its location and need for possible PPM.    ------> Pt is scheduled for a PVC ablation with Dr. Autumn Moeller on 9/22/20 and wants to proceed. He is aware of the possible need for a PPM    2. Implantable Loop Recorder  - S/p ILR insertion on 10/19  -The CIED was interrogated and programmed and I supervised and reviewed all the data. All findings and changes are in device interrogation sheat and reflect my personal interpretation and changes and is scanned to Epic  - Device shows: NSR. Symptoms with PVC  - Follow up with device clinic as scheduled    3. Bradycardia   - Noted on loop during morning/sleeping hours   - Asymptomatic   - Avoid CCB/BB   - Continue to monitor    4. HTN  - Controlled, on low side: Goal <130/80  - Stop amlodipine  - Encouraged to monitor and log BP readings at home, then bring log to next visit  - Discussed importance of low sodium diet, weight control and exercise    5.  CAD  - Mild LAD lesion per [facility] (10/19)  - Stable  - No complaints of angina  - Continue ASA, ARB, and statin    6. Chronic diastolic heart failure (NYHA Class II)  - Appears compensated   ~ EF 55% per echo; Grade I DD (7/19)  - Continue with HCTZ 25 mg QD, losartan 100 mg QD  ~ No BB due to borderline HR  - Aggressive medical therapy with risk factor modification  - Discussed with patient importance of monitoring weight, low sodium diet and fluid restriction    7. Hx of Stroke   - On ASA/Plavix/statin   - Followed by neurology    8. AUDIE  - Stable: Uses CPAP  - Encourage to use CPAP to prevent long term effects of untreated AUDIE    9. Obesity  - Body mass index is 35.64 kg/m². - Complicating assessment and treatment. Placing patient at risk for multiple co-morbidities as well as early death and contributing to the patient's presentation  - Discussed weight loss and patient was encouraged to reduce calorie intake and increase exercise    Plan:  1. Stop amlodipine  2. No food or drink after midnight on Monday for procedure  3. Call office if any issues. F/U: Follow-up with Dr. Paradise Butt in October and EP following ablation  -Follow up with device clinic as scheduled  -Call Ella Jackson at [phone] with any questions    Diet & Exercise:   The patient is counseled to follow a low salt diet to assure blood pressure remains controlled for cardiovascular risk factor modification   The patient is counseled to avoid excess caffeine, and energy drinks as this may exacerbated ectopy and arrhythmia   The patient is counseled to lose weight to control cardiovascular risk factors   Exercise program discussed: To improve overall cardiovascular health, the patient is instructed to increase cardiovascular related activities with a goal of 150 min/week of moderate level activity or 10,000 steps per day. Encouraged to perform as much activity as tolerated    Quality Metrics  1. Tobacco Cessation Counseling: N/A  2. Retake of BP if >140/90: N/A  3. Documentation to PCP: Note sent to PCP office visit  4. CAD patient on anti-platelet: Yes  5. CAD patient on STATIN therapy: Yes   6. Patient with history of CHF and atrial fibrillation on anticoagulation: N/A      I have addressed the patient's cardiac risk factors and adjusted pharmacologic treatment as needed. In addition, I have reinforced the need for patient directed risk factor modification. I independently reviewed the device check interrogation and ECG    All questions and concerns were addressed with the patient. Alternatives to treatment were discussed. Thank you for allowing to us to participate in the care of Israel JimenezMesfin Gonzalez, FAHAD-CNP  [address]   Office: [phone]

## 2020-09-15 NOTE — PROGRESS NOTES
Geeta Douglas received a viral test for COVID-19. They were educated on isolation and quarantine as appropriate. For any symptoms, they were directed to seek care from their PCP, given contact information to establish with a doctor, directed to an urgent care or the emergency room.

## 2020-09-15 NOTE — PATIENT INSTRUCTIONS

## 2020-09-16 ENCOUNTER — NURSE ONLY (OUTPATIENT)
Dept: CARDIOLOGY CLINIC | Age: 75
End: 2020-09-16
Payer: MEDICARE

## 2020-09-16 ENCOUNTER — OFFICE VISIT (OUTPATIENT)
Dept: CARDIOLOGY CLINIC | Age: 75
End: 2020-09-16
Payer: MEDICARE

## 2020-09-16 VITALS
HEART RATE: 68 BPM | BODY MASS INDEX: 35.52 KG/M2 | DIASTOLIC BLOOD PRESSURE: 66 MMHG | HEIGHT: 68 IN | SYSTOLIC BLOOD PRESSURE: 94 MMHG | WEIGHT: 234.4 LBS

## 2020-09-16 LAB — SARS-COV-2, NAA: NOT DETECTED

## 2020-09-16 PROCEDURE — 99214 OFFICE O/P EST MOD 30 MIN: CPT | Performed by: NURSE PRACTITIONER

## 2020-09-16 PROCEDURE — 1123F ACP DISCUSS/DSCN MKR DOCD: CPT | Performed by: NURSE PRACTITIONER

## 2020-09-16 PROCEDURE — G8427 DOCREV CUR MEDS BY ELIG CLIN: HCPCS | Performed by: NURSE PRACTITIONER

## 2020-09-16 PROCEDURE — G8417 CALC BMI ABV UP PARAM F/U: HCPCS | Performed by: NURSE PRACTITIONER

## 2020-09-16 PROCEDURE — 1036F TOBACCO NON-USER: CPT | Performed by: NURSE PRACTITIONER

## 2020-09-16 PROCEDURE — 93000 ELECTROCARDIOGRAM COMPLETE: CPT | Performed by: NURSE PRACTITIONER

## 2020-09-16 PROCEDURE — 4040F PNEUMOC VAC/ADMIN/RCVD: CPT | Performed by: NURSE PRACTITIONER

## 2020-09-16 PROCEDURE — 93291 INTERROG DEV EVAL SCRMS IP: CPT | Performed by: INTERNAL MEDICINE

## 2020-09-16 RX ORDER — TAMSULOSIN HYDROCHLORIDE 0.4 MG/1
0.4 CAPSULE ORAL 2 TIMES DAILY
Qty: 60 CAPSULE | Refills: 0 | Status: SHIPPED
Start: 2020-09-16 | End: 2020-10-27 | Stop reason: DRUGHIGH

## 2020-09-16 NOTE — PATIENT INSTRUCTIONS
1. Stop amlodipine  2. No food or drink after midnight on Monday for procedure  3.  Bring CPAP for hospital stay

## 2020-09-16 NOTE — PROGRESS NOTES
Patient comes in for interrogation of their implanted loop recorder. Interrogation shows 1 symptom recording shows SR with PVCs. 1 pause episode on 8/29/2020 lasting 3 seconds at early morning/sleep time hour. Many Naren episodes during early morning/sleep time hours. Last on 9/14/2020, longest 44 seconds with a Min V rate of <30 bpm.  Implanted for Stroke. Patient remains on Plavix. Please see interrogation for more detail. Patient will see NPSR today and we will continue to follow the Patient remotely.

## 2020-09-21 ENCOUNTER — ANESTHESIA EVENT (OUTPATIENT)
Dept: CARDIAC CATH/INVASIVE PROCEDURES | Age: 75
End: 2020-09-21
Payer: MEDICARE

## 2020-09-21 ENCOUNTER — HOSPITAL ENCOUNTER (OUTPATIENT)
Dept: PULMONOLOGY | Age: 75
Discharge: HOME | End: 2020-09-21
Payer: MEDICARE

## 2020-09-21 VITALS — OXYGEN SATURATION: 100 %

## 2020-09-21 LAB
DLCO %PRED: 92 %
DLCO PRED: NORMAL
DLCO/VA %PRED: NORMAL
DLCO/VA PRED: NORMAL
DLCO/VA: NORMAL
DLCO: NORMAL
EXPIRATORY TIME-POST: NORMAL
EXPIRATORY TIME: NORMAL
FEF 25-75% %CHNG: NORMAL
FEF 25-75% %PRED-POST: NORMAL
FEF 25-75% %PRED-PRE: NORMAL
FEF 25-75% PRED: NORMAL
FEF 25-75%-POST: NORMAL
FEF 25-75%-PRE: NORMAL
FEV1 %PRED-POST: NORMAL %
FEV1 %PRED-PRE: 104 %
FEV1 PRED: NORMAL
FEV1-POST: NORMAL
FEV1-PRE: NORMAL
FEV1/FVC %PRED-POST: NORMAL
FEV1/FVC %PRED-PRE: NORMAL
FEV1/FVC PRED: NORMAL
FEV1/FVC-POST: NORMAL %
FEV1/FVC-PRE: 84 %
FVC %PRED-POST: NORMAL
FVC %PRED-PRE: NORMAL
FVC PRED: NORMAL
FVC-POST: NORMAL
FVC-PRE: NORMAL
GAW %PRED: NORMAL
GAW PRED: NORMAL
GAW: NORMAL
IC %PRED: NORMAL
IC PRED: NORMAL
IC: NORMAL
MEP: NORMAL
MIP: NORMAL
MVV %PRED-PRE: NORMAL
MVV PRED: NORMAL
MVV-PRE: NORMAL
PEF %PRED-POST: NORMAL
PEF %PRED-PRE: NORMAL
PEF PRED: NORMAL
PEF%CHNG: NORMAL
PEF-POST: NORMAL
PEF-PRE: NORMAL
RAW %PRED: NORMAL
RAW PRED: NORMAL
RAW: NORMAL
RV %PRED: NORMAL
RV PRED: NORMAL
RV: NORMAL
SVC %PRED: NORMAL
SVC PRED: NORMAL
SVC: NORMAL
TLC %PRED: 96 %
TLC PRED: NORMAL
TLC: NORMAL
VA %PRED: NORMAL
VA PRED: NORMAL
VA: NORMAL
VTG %PRED: NORMAL
VTG PRED: NORMAL
VTG: NORMAL

## 2020-09-21 PROCEDURE — 94726 PLETHYSMOGRAPHY LUNG VOLUMES: CPT

## 2020-09-21 PROCEDURE — 94760 N-INVAS EAR/PLS OXIMETRY 1: CPT

## 2020-09-21 PROCEDURE — 94729 DIFFUSING CAPACITY: CPT

## 2020-09-21 PROCEDURE — 94010 BREATHING CAPACITY TEST: CPT

## 2020-09-21 PROCEDURE — 94200 LUNG FUNCTION TEST (MBC/MVV): CPT

## 2020-09-21 ASSESSMENT — PULMONARY FUNCTION TESTS
FEV1_PERCENT_PREDICTED_PRE: 104
FEV1/FVC_PRE: 84

## 2020-09-22 ENCOUNTER — HOSPITAL ENCOUNTER (OUTPATIENT)
Dept: CARDIAC CATH/INVASIVE PROCEDURES | Age: 75
Discharge: HOME | End: 2020-09-23
Attending: INTERNAL MEDICINE | Admitting: INTERNAL MEDICINE
Payer: MEDICARE

## 2020-09-22 ENCOUNTER — ANESTHESIA (OUTPATIENT)
Dept: CARDIAC CATH/INVASIVE PROCEDURES | Age: 75
End: 2020-09-22
Payer: MEDICARE

## 2020-09-22 VITALS
RESPIRATION RATE: 9 BRPM | DIASTOLIC BLOOD PRESSURE: 65 MMHG | SYSTOLIC BLOOD PRESSURE: 103 MMHG | TEMPERATURE: 96.3 F | OXYGEN SATURATION: 93 %

## 2020-09-22 LAB
ANION GAP SERPL CALCULATED.3IONS-SCNC: 13 MMOL/L (ref 3–16)
BUN BLDV-MCNC: 18 MG/DL (ref 7–20)
CALCIUM SERPL-MCNC: 9.4 MG/DL (ref 8.3–10.6)
CHLORIDE BLD-SCNC: 108 MMOL/L (ref 99–110)
CO2: 20 MMOL/L (ref 21–32)
CREAT SERPL-MCNC: 1.2 MG/DL (ref 0.8–1.3)
EKG ATRIAL RATE: 61 BPM
EKG DIAGNOSIS: NORMAL
EKG P AXIS: 22 DEGREES
EKG P-R INTERVAL: 208 MS
EKG Q-T INTERVAL: 474 MS
EKG QRS DURATION: 150 MS
EKG QTC CALCULATION (BAZETT): 477 MS
EKG R AXIS: -40 DEGREES
EKG T AXIS: 4 DEGREES
EKG VENTRICULAR RATE: 61 BPM
GFR AFRICAN AMERICAN: >60
GFR NON-AFRICAN AMERICAN: 59
GLUCOSE BLD-MCNC: 121 MG/DL (ref 70–99)
GLUCOSE BLD-MCNC: 121 MG/DL (ref 70–99)
HCT VFR BLD CALC: 41.2 % (ref 40.5–52.5)
HEMOGLOBIN: 14.1 G/DL (ref 13.5–17.5)
MCH RBC QN AUTO: 32 PG (ref 26–34)
MCHC RBC AUTO-ENTMCNC: 34.3 G/DL (ref 31–36)
MCV RBC AUTO: 93.4 FL (ref 80–100)
PDW BLD-RTO: 13.7 % (ref 12.4–15.4)
PERFORMED ON: ABNORMAL
PLATELET # BLD: 202 K/UL (ref 135–450)
PMV BLD AUTO: 9.4 FL (ref 5–10.5)
POC ACT LR: 280 SEC
POC ACT LR: 380 SEC
POTASSIUM SERPL-SCNC: 3.6 MMOL/L (ref 3.5–5.1)
RBC # BLD: 4.41 M/UL (ref 4.2–5.9)
SODIUM BLD-SCNC: 141 MMOL/L (ref 136–145)
WBC # BLD: 6.8 K/UL (ref 4–11)

## 2020-09-22 PROCEDURE — 93005 ELECTROCARDIOGRAM TRACING: CPT | Performed by: INTERNAL MEDICINE

## 2020-09-22 PROCEDURE — 85027 COMPLETE CBC AUTOMATED: CPT

## 2020-09-22 PROCEDURE — 36415 COLL VENOUS BLD VENIPUNCTURE: CPT

## 2020-09-22 PROCEDURE — 2500000003 HC RX 250 WO HCPCS: Performed by: NURSE ANESTHETIST, CERTIFIED REGISTERED

## 2020-09-22 PROCEDURE — 93654 COMPRE EP EVAL TX VT: CPT

## 2020-09-22 PROCEDURE — 2580000003 HC RX 258

## 2020-09-22 PROCEDURE — 6370000000 HC RX 637 (ALT 250 FOR IP): Performed by: INTERNAL MEDICINE

## 2020-09-22 PROCEDURE — 2500000003 HC RX 250 WO HCPCS

## 2020-09-22 PROCEDURE — C1760 CLOSURE DEV, VASC: HCPCS

## 2020-09-22 PROCEDURE — 7100000000 HC PACU RECOVERY - FIRST 15 MIN

## 2020-09-22 PROCEDURE — 3700000001 HC ADD 15 MINUTES (ANESTHESIA)

## 2020-09-22 PROCEDURE — 93010 ELECTROCARDIOGRAM REPORT: CPT | Performed by: INTERNAL MEDICINE

## 2020-09-22 PROCEDURE — G0269 OCCLUSIVE DEVICE IN VEIN ART: HCPCS

## 2020-09-22 PROCEDURE — 80048 BASIC METABOLIC PNL TOTAL CA: CPT

## 2020-09-22 PROCEDURE — 93662 INTRACARDIAC ECG (ICE): CPT | Performed by: INTERNAL MEDICINE

## 2020-09-22 PROCEDURE — C1759 CATH, INTRA ECHOCARDIOGRAPHY: HCPCS

## 2020-09-22 PROCEDURE — 2580000003 HC RX 258: Performed by: INTERNAL MEDICINE

## 2020-09-22 PROCEDURE — 3700000000 HC ANESTHESIA ATTENDED CARE

## 2020-09-22 PROCEDURE — C1732 CATH, EP, DIAG/ABL, 3D/VECT: HCPCS

## 2020-09-22 PROCEDURE — 7100000001 HC PACU RECOVERY - ADDTL 15 MIN

## 2020-09-22 PROCEDURE — 93621 COMP EP EVL L PAC&REC C SINS: CPT | Performed by: INTERNAL MEDICINE

## 2020-09-22 PROCEDURE — 93623 PRGRMD STIMJ&PACG IV RX NFS: CPT | Performed by: INTERNAL MEDICINE

## 2020-09-22 PROCEDURE — 93621 COMP EP EVL L PAC&REC C SINS: CPT

## 2020-09-22 PROCEDURE — C1894 INTRO/SHEATH, NON-LASER: HCPCS

## 2020-09-22 PROCEDURE — 93662 INTRACARDIAC ECG (ICE): CPT

## 2020-09-22 PROCEDURE — 6360000002 HC RX W HCPCS: Performed by: NURSE ANESTHETIST, CERTIFIED REGISTERED

## 2020-09-22 PROCEDURE — 85347 COAGULATION TIME ACTIVATED: CPT

## 2020-09-22 PROCEDURE — C1769 GUIDE WIRE: HCPCS

## 2020-09-22 PROCEDURE — 2580000003 HC RX 258: Performed by: NURSE ANESTHETIST, CERTIFIED REGISTERED

## 2020-09-22 PROCEDURE — 6360000002 HC RX W HCPCS

## 2020-09-22 PROCEDURE — 93654 COMPRE EP EVAL TX VT: CPT | Performed by: INTERNAL MEDICINE

## 2020-09-22 PROCEDURE — 93623 PRGRMD STIMJ&PACG IV RX NFS: CPT

## 2020-09-22 RX ORDER — LOSARTAN POTASSIUM 100 MG/1
100 TABLET ORAL DAILY
Status: DISCONTINUED | OUTPATIENT
Start: 2020-09-22 | End: 2020-09-23 | Stop reason: HOSPADM

## 2020-09-22 RX ORDER — HYDROMORPHONE HCL 110MG/55ML
0.25 PATIENT CONTROLLED ANALGESIA SYRINGE INTRAVENOUS EVERY 5 MIN PRN
Status: DISCONTINUED | OUTPATIENT
Start: 2020-09-22 | End: 2020-09-22

## 2020-09-22 RX ORDER — ROCURONIUM BROMIDE 10 MG/ML
INJECTION, SOLUTION INTRAVENOUS PRN
Status: DISCONTINUED | OUTPATIENT
Start: 2020-09-22 | End: 2020-09-22 | Stop reason: SDUPTHER

## 2020-09-22 RX ORDER — ONDANSETRON 2 MG/ML
4 INJECTION INTRAMUSCULAR; INTRAVENOUS
Status: DISCONTINUED | OUTPATIENT
Start: 2020-09-22 | End: 2020-09-22

## 2020-09-22 RX ORDER — PROPOFOL 10 MG/ML
INJECTION, EMULSION INTRAVENOUS PRN
Status: DISCONTINUED | OUTPATIENT
Start: 2020-09-22 | End: 2020-09-22 | Stop reason: SDUPTHER

## 2020-09-22 RX ORDER — SODIUM CHLORIDE, SODIUM LACTATE, POTASSIUM CHLORIDE, CALCIUM CHLORIDE 600; 310; 30; 20 MG/100ML; MG/100ML; MG/100ML; MG/100ML
INJECTION, SOLUTION INTRAVENOUS CONTINUOUS
Status: DISCONTINUED | OUTPATIENT
Start: 2020-09-22 | End: 2020-09-22 | Stop reason: ALTCHOICE

## 2020-09-22 RX ORDER — EZETIMIBE 10 MG/1
10 TABLET ORAL DAILY
Status: DISCONTINUED | OUTPATIENT
Start: 2020-09-22 | End: 2020-09-22 | Stop reason: RX

## 2020-09-22 RX ORDER — SODIUM CHLORIDE 9 MG/ML
INJECTION, SOLUTION INTRAVENOUS CONTINUOUS PRN
Status: DISCONTINUED | OUTPATIENT
Start: 2020-09-22 | End: 2020-09-22 | Stop reason: SDUPTHER

## 2020-09-22 RX ORDER — FLUTICASONE PROPIONATE 50 MCG
1 SPRAY, SUSPENSION (ML) NASAL DAILY
Status: DISCONTINUED | OUTPATIENT
Start: 2020-09-22 | End: 2020-09-23 | Stop reason: HOSPADM

## 2020-09-22 RX ORDER — SODIUM CHLORIDE 0.9 % (FLUSH) 0.9 %
10 SYRINGE (ML) INJECTION EVERY 12 HOURS SCHEDULED
Status: DISCONTINUED | OUTPATIENT
Start: 2020-09-22 | End: 2020-09-22 | Stop reason: ALTCHOICE

## 2020-09-22 RX ORDER — HEPARIN SODIUM 1000 [USP'U]/ML
INJECTION, SOLUTION INTRAVENOUS; SUBCUTANEOUS PRN
Status: DISCONTINUED | OUTPATIENT
Start: 2020-09-22 | End: 2020-09-22 | Stop reason: SDUPTHER

## 2020-09-22 RX ORDER — CLOPIDOGREL BISULFATE 75 MG/1
75 TABLET ORAL DAILY
Status: DISCONTINUED | OUTPATIENT
Start: 2020-09-22 | End: 2020-09-23 | Stop reason: HOSPADM

## 2020-09-22 RX ORDER — TAMSULOSIN HYDROCHLORIDE 0.4 MG/1
0.4 CAPSULE ORAL 2 TIMES DAILY
Status: DISCONTINUED | OUTPATIENT
Start: 2020-09-22 | End: 2020-09-23 | Stop reason: HOSPADM

## 2020-09-22 RX ORDER — SODIUM CHLORIDE 0.9 % (FLUSH) 0.9 %
10 SYRINGE (ML) INJECTION EVERY 12 HOURS SCHEDULED
Status: DISCONTINUED | OUTPATIENT
Start: 2020-09-22 | End: 2020-09-23 | Stop reason: HOSPADM

## 2020-09-22 RX ORDER — TROSPIUM CHLORIDE 20 MG/1
20 TABLET, FILM COATED ORAL
Status: DISCONTINUED | OUTPATIENT
Start: 2020-09-22 | End: 2020-09-23 | Stop reason: HOSPADM

## 2020-09-22 RX ORDER — LIDOCAINE HYDROCHLORIDE 10 MG/ML
1 INJECTION, SOLUTION EPIDURAL; INFILTRATION; INTRACAUDAL; PERINEURAL
Status: DISCONTINUED | OUTPATIENT
Start: 2020-09-22 | End: 2020-09-22 | Stop reason: ALTCHOICE

## 2020-09-22 RX ORDER — FENTANYL CITRATE 50 UG/ML
INJECTION, SOLUTION INTRAMUSCULAR; INTRAVENOUS PRN
Status: DISCONTINUED | OUTPATIENT
Start: 2020-09-22 | End: 2020-09-22 | Stop reason: SDUPTHER

## 2020-09-22 RX ORDER — FENTANYL CITRATE 50 UG/ML
25 INJECTION, SOLUTION INTRAMUSCULAR; INTRAVENOUS EVERY 5 MIN PRN
Status: DISCONTINUED | OUTPATIENT
Start: 2020-09-22 | End: 2020-09-22

## 2020-09-22 RX ORDER — HYDROCHLOROTHIAZIDE 25 MG/1
25 TABLET ORAL DAILY
Status: DISCONTINUED | OUTPATIENT
Start: 2020-09-22 | End: 2020-09-23 | Stop reason: HOSPADM

## 2020-09-22 RX ORDER — LIDOCAINE HYDROCHLORIDE 20 MG/ML
INJECTION, SOLUTION EPIDURAL; INFILTRATION; INTRACAUDAL; PERINEURAL PRN
Status: DISCONTINUED | OUTPATIENT
Start: 2020-09-22 | End: 2020-09-22 | Stop reason: SDUPTHER

## 2020-09-22 RX ORDER — ASPIRIN 81 MG/1
81 TABLET ORAL DAILY
Status: DISCONTINUED | OUTPATIENT
Start: 2020-09-23 | End: 2020-09-23 | Stop reason: HOSPADM

## 2020-09-22 RX ORDER — ASPIRIN 81 MG/1
81 TABLET ORAL DAILY
Status: DISCONTINUED | OUTPATIENT
Start: 2020-09-22 | End: 2020-09-22 | Stop reason: SDUPTHER

## 2020-09-22 RX ORDER — HYDRALAZINE HYDROCHLORIDE 20 MG/ML
5 INJECTION INTRAMUSCULAR; INTRAVENOUS EVERY 10 MIN PRN
Status: DISCONTINUED | OUTPATIENT
Start: 2020-09-22 | End: 2020-09-22

## 2020-09-22 RX ORDER — ACETAMINOPHEN 325 MG/1
650 TABLET ORAL EVERY 4 HOURS PRN
Status: DISCONTINUED | OUTPATIENT
Start: 2020-09-22 | End: 2020-09-23 | Stop reason: HOSPADM

## 2020-09-22 RX ORDER — VITAMIN B COMPLEX
2 TABLET ORAL DAILY
Status: DISCONTINUED | OUTPATIENT
Start: 2020-09-22 | End: 2020-09-23 | Stop reason: HOSPADM

## 2020-09-22 RX ORDER — PROPOFOL 10 MG/ML
INJECTION, EMULSION INTRAVENOUS CONTINUOUS PRN
Status: DISCONTINUED | OUTPATIENT
Start: 2020-09-22 | End: 2020-09-22 | Stop reason: SDUPTHER

## 2020-09-22 RX ORDER — SODIUM CHLORIDE 0.9 % (FLUSH) 0.9 %
10 SYRINGE (ML) INJECTION PRN
Status: DISCONTINUED | OUTPATIENT
Start: 2020-09-22 | End: 2020-09-23 | Stop reason: HOSPADM

## 2020-09-22 RX ORDER — HYDROCODONE BITARTRATE AND ACETAMINOPHEN 5; 325 MG/1; MG/1
1 TABLET ORAL
Status: DISCONTINUED | OUTPATIENT
Start: 2020-09-22 | End: 2020-09-22

## 2020-09-22 RX ORDER — SUCCINYLCHOLINE/SOD CL,ISO/PF 200MG/10ML
SYRINGE (ML) INTRAVENOUS PRN
Status: DISCONTINUED | OUTPATIENT
Start: 2020-09-22 | End: 2020-09-22 | Stop reason: SDUPTHER

## 2020-09-22 RX ORDER — PRAVASTATIN SODIUM 80 MG/1
80 TABLET ORAL EVERY EVENING
Status: DISCONTINUED | OUTPATIENT
Start: 2020-09-22 | End: 2020-09-23 | Stop reason: HOSPADM

## 2020-09-22 RX ORDER — DEXAMETHASONE SODIUM PHOSPHATE 4 MG/ML
INJECTION, SOLUTION INTRA-ARTICULAR; INTRALESIONAL; INTRAMUSCULAR; INTRAVENOUS; SOFT TISSUE PRN
Status: DISCONTINUED | OUTPATIENT
Start: 2020-09-22 | End: 2020-09-22 | Stop reason: SDUPTHER

## 2020-09-22 RX ORDER — PANTOPRAZOLE SODIUM 40 MG/1
40 TABLET, DELAYED RELEASE ORAL
Status: DISCONTINUED | OUTPATIENT
Start: 2020-09-23 | End: 2020-09-23 | Stop reason: HOSPADM

## 2020-09-22 RX ORDER — LABETALOL HYDROCHLORIDE 5 MG/ML
5 INJECTION, SOLUTION INTRAVENOUS EVERY 10 MIN PRN
Status: DISCONTINUED | OUTPATIENT
Start: 2020-09-22 | End: 2020-09-22

## 2020-09-22 RX ORDER — SODIUM CHLORIDE 0.9 % (FLUSH) 0.9 %
10 SYRINGE (ML) INJECTION PRN
Status: DISCONTINUED | OUTPATIENT
Start: 2020-09-22 | End: 2020-09-22 | Stop reason: ALTCHOICE

## 2020-09-22 RX ORDER — NEOSTIGMINE METHYLSULFATE 5 MG/5 ML
SYRINGE (ML) INTRAVENOUS PRN
Status: DISCONTINUED | OUTPATIENT
Start: 2020-09-22 | End: 2020-09-22 | Stop reason: SDUPTHER

## 2020-09-22 RX ORDER — VITAMIN B COMPLEX
1 CAPSULE ORAL DAILY
Status: DISCONTINUED | OUTPATIENT
Start: 2020-09-22 | End: 2020-09-22 | Stop reason: CLARIF

## 2020-09-22 RX ORDER — ONDANSETRON 2 MG/ML
INJECTION INTRAMUSCULAR; INTRAVENOUS PRN
Status: DISCONTINUED | OUTPATIENT
Start: 2020-09-22 | End: 2020-09-22 | Stop reason: SDUPTHER

## 2020-09-22 RX ORDER — GLYCOPYRROLATE 1 MG/5 ML
SYRINGE (ML) INTRAVENOUS PRN
Status: DISCONTINUED | OUTPATIENT
Start: 2020-09-22 | End: 2020-09-22 | Stop reason: SDUPTHER

## 2020-09-22 RX ORDER — PROCHLORPERAZINE EDISYLATE 5 MG/ML
5 INJECTION INTRAMUSCULAR; INTRAVENOUS
Status: DISCONTINUED | OUTPATIENT
Start: 2020-09-22 | End: 2020-09-22

## 2020-09-22 RX ADMIN — FENTANYL CITRATE 50 MCG: 50 INJECTION INTRAMUSCULAR; INTRAVENOUS at 07:40

## 2020-09-22 RX ADMIN — HYDROCHLOROTHIAZIDE 25 MG: 25 TABLET ORAL at 14:58

## 2020-09-22 RX ADMIN — ROCURONIUM BROMIDE 10 MG: 10 INJECTION, SOLUTION INTRAVENOUS at 07:59

## 2020-09-22 RX ADMIN — ROCURONIUM BROMIDE 10 MG: 10 INJECTION, SOLUTION INTRAVENOUS at 08:25

## 2020-09-22 RX ADMIN — SODIUM CHLORIDE: 9 INJECTION, SOLUTION INTRAVENOUS at 08:05

## 2020-09-22 RX ADMIN — VITAMIN D, TAB 1000IU (100/BT) 2000 UNITS: 25 TAB at 15:01

## 2020-09-22 RX ADMIN — ONDANSETRON 4 MG: 2 INJECTION INTRAMUSCULAR; INTRAVENOUS at 09:42

## 2020-09-22 RX ADMIN — TAMSULOSIN HYDROCHLORIDE 0.4 MG: 0.4 CAPSULE ORAL at 21:00

## 2020-09-22 RX ADMIN — ISOPROTERENOL HYDROCHLORIDE 5 MCG: 0.2 INJECTION, SOLUTION INTRAMUSCULAR; INTRAVENOUS at 09:01

## 2020-09-22 RX ADMIN — PROPOFOL 50 MG: 10 INJECTION, EMULSION INTRAVENOUS at 08:25

## 2020-09-22 RX ADMIN — Medication 10 ML: at 14:59

## 2020-09-22 RX ADMIN — TAMSULOSIN HYDROCHLORIDE 0.4 MG: 0.4 CAPSULE ORAL at 14:59

## 2020-09-22 RX ADMIN — SODIUM CHLORIDE: 9 INJECTION, SOLUTION INTRAVENOUS at 07:32

## 2020-09-22 RX ADMIN — Medication 0.4 MG: at 09:44

## 2020-09-22 RX ADMIN — PHENYLEPHRINE HYDROCHLORIDE 50 MCG/MIN: 10 INJECTION INTRAVENOUS at 07:45

## 2020-09-22 RX ADMIN — PRAVASTATIN SODIUM 80 MG: 80 TABLET ORAL at 19:05

## 2020-09-22 RX ADMIN — Medication 0.2 MG: at 09:57

## 2020-09-22 RX ADMIN — PROPOFOL 150 MG: 10 INJECTION, EMULSION INTRAVENOUS at 07:40

## 2020-09-22 RX ADMIN — Medication 3 MG: at 09:44

## 2020-09-22 RX ADMIN — Medication 10 ML: at 21:00

## 2020-09-22 RX ADMIN — PROPOFOL 50 MG: 10 INJECTION, EMULSION INTRAVENOUS at 07:41

## 2020-09-22 RX ADMIN — PROPOFOL 100 MCG/KG/MIN: 10 INJECTION, EMULSION INTRAVENOUS at 07:43

## 2020-09-22 RX ADMIN — ASPIRIN 81 MG: 81 TABLET, COATED ORAL at 15:02

## 2020-09-22 RX ADMIN — Medication 0.1 MG: at 07:52

## 2020-09-22 RX ADMIN — ROCURONIUM BROMIDE 10 MG: 10 INJECTION, SOLUTION INTRAVENOUS at 09:11

## 2020-09-22 RX ADMIN — LOSARTAN POTASSIUM 100 MG: 100 TABLET, FILM COATED ORAL at 14:59

## 2020-09-22 RX ADMIN — FLUTICASONE PROPIONATE 1 SPRAY: 50 SPRAY, METERED NASAL at 15:03

## 2020-09-22 RX ADMIN — FENTANYL CITRATE 50 MCG: 50 INJECTION INTRAMUSCULAR; INTRAVENOUS at 07:59

## 2020-09-22 RX ADMIN — DEXAMETHASONE SODIUM PHOSPHATE 8 MG: 4 INJECTION, SOLUTION INTRAMUSCULAR; INTRAVENOUS at 08:28

## 2020-09-22 RX ADMIN — PROPOFOL 50 MG: 10 INJECTION, EMULSION INTRAVENOUS at 09:11

## 2020-09-22 RX ADMIN — LIDOCAINE HYDROCHLORIDE 40 MG: 20 INJECTION, SOLUTION EPIDURAL; INFILTRATION; INTRACAUDAL; PERINEURAL at 07:40

## 2020-09-22 RX ADMIN — Medication 160 MG: at 07:41

## 2020-09-22 RX ADMIN — HEPARIN SODIUM 10000 UNITS: 1000 INJECTION INTRAVENOUS; SUBCUTANEOUS at 08:41

## 2020-09-22 RX ADMIN — TROSPIUM CHLORIDE 20 MG: 20 TABLET, FILM COATED ORAL at 17:05

## 2020-09-22 RX ADMIN — ROCURONIUM BROMIDE 10 MG: 10 INJECTION, SOLUTION INTRAVENOUS at 09:01

## 2020-09-22 RX ADMIN — CLOPIDOGREL BISULFATE 75 MG: 75 TABLET ORAL at 14:59

## 2020-09-22 RX ADMIN — Medication 1 MG: at 09:57

## 2020-09-22 ASSESSMENT — PULMONARY FUNCTION TESTS
PIF_VALUE: 19
PIF_VALUE: 16
PIF_VALUE: 16
PIF_VALUE: 19
PIF_VALUE: 21
PIF_VALUE: 18
PIF_VALUE: 19
PIF_VALUE: 17
PIF_VALUE: 19
PIF_VALUE: 22
PIF_VALUE: 19
PIF_VALUE: 18
PIF_VALUE: 16
PIF_VALUE: 8
PIF_VALUE: 18
PIF_VALUE: 17
PIF_VALUE: 17
PIF_VALUE: 16
PIF_VALUE: 3
PIF_VALUE: 18
PIF_VALUE: 17
PIF_VALUE: 18
PIF_VALUE: 17
PIF_VALUE: 16
PIF_VALUE: 21
PIF_VALUE: 2
PIF_VALUE: 17
PIF_VALUE: 18
PIF_VALUE: 35
PIF_VALUE: 19
PIF_VALUE: 18
PIF_VALUE: 16
PIF_VALUE: 17
PIF_VALUE: 15
PIF_VALUE: 21
PIF_VALUE: 3
PIF_VALUE: 17
PIF_VALUE: 17
PIF_VALUE: 18
PIF_VALUE: 1
PIF_VALUE: 18
PIF_VALUE: 16
PIF_VALUE: 18
PIF_VALUE: 18
PIF_VALUE: 3
PIF_VALUE: 17
PIF_VALUE: 17
PIF_VALUE: 18
PIF_VALUE: 19
PIF_VALUE: 19
PIF_VALUE: 18
PIF_VALUE: 17
PIF_VALUE: 17
PIF_VALUE: 3
PIF_VALUE: 18
PIF_VALUE: 16
PIF_VALUE: 20
PIF_VALUE: 18
PIF_VALUE: 17
PIF_VALUE: 16
PIF_VALUE: 18
PIF_VALUE: 17
PIF_VALUE: 17
PIF_VALUE: 19
PIF_VALUE: 17
PIF_VALUE: 17
PIF_VALUE: 18
PIF_VALUE: 18
PIF_VALUE: 19
PIF_VALUE: 19
PIF_VALUE: 17
PIF_VALUE: 16
PIF_VALUE: 2
PIF_VALUE: 7
PIF_VALUE: 17
PIF_VALUE: 19
PIF_VALUE: 32
PIF_VALUE: 18
PIF_VALUE: 19
PIF_VALUE: 17
PIF_VALUE: 18
PIF_VALUE: 19
PIF_VALUE: 3
PIF_VALUE: 17
PIF_VALUE: 19
PIF_VALUE: 18
PIF_VALUE: 19
PIF_VALUE: 19
PIF_VALUE: 16
PIF_VALUE: 1
PIF_VALUE: 2
PIF_VALUE: 16
PIF_VALUE: 16
PIF_VALUE: 27
PIF_VALUE: 19
PIF_VALUE: 17
PIF_VALUE: 0
PIF_VALUE: 18
PIF_VALUE: 17
PIF_VALUE: 16
PIF_VALUE: 19
PIF_VALUE: 1
PIF_VALUE: 3
PIF_VALUE: 16
PIF_VALUE: 15
PIF_VALUE: 18
PIF_VALUE: 21
PIF_VALUE: 17
PIF_VALUE: 1
PIF_VALUE: 18
PIF_VALUE: 1
PIF_VALUE: 18
PIF_VALUE: 16
PIF_VALUE: 3
PIF_VALUE: 19
PIF_VALUE: 18
PIF_VALUE: 17
PIF_VALUE: 17
PIF_VALUE: 18
PIF_VALUE: 19
PIF_VALUE: 17
PIF_VALUE: 16
PIF_VALUE: 19
PIF_VALUE: 17
PIF_VALUE: 32
PIF_VALUE: 3
PIF_VALUE: 17
PIF_VALUE: 18
PIF_VALUE: 3
PIF_VALUE: 16
PIF_VALUE: 19
PIF_VALUE: 18
PIF_VALUE: 25
PIF_VALUE: 17
PIF_VALUE: 18
PIF_VALUE: 1
PIF_VALUE: 16
PIF_VALUE: 18
PIF_VALUE: 19
PIF_VALUE: 18
PIF_VALUE: 2
PIF_VALUE: 16
PIF_VALUE: 1

## 2020-09-22 ASSESSMENT — ENCOUNTER SYMPTOMS: SHORTNESS OF BREATH: 0

## 2020-09-22 ASSESSMENT — PAIN SCALES - GENERAL
PAINLEVEL_OUTOF10: 0

## 2020-09-22 NOTE — PROCEDURES
Pulmonary Function Testing      Patient name:  Geeta Douglas     [facility] Unit #:   [id number]   Date of test:  9/21/2020  Date of interpretation:   9/22/2020    Mr. Geeta Douglas is a 66y.o. year-old former smoker. The spirometry data were acceptable and reproducible. Spirometry:  Flow volume loops were normal. The FEV-1/FVC ratio was normal. The FEV-1 was 2.87 liters (104% of predicted), which was normal. The FVC was 3.43 liters (89% of predicted), which was normal. Response to inhaled bronchodilators (albuterol) was not performed. Lung volumes:  Lung volumes were not tested by plethysmography. The total lung capacity was 5.72 liters (96% of predicted), which was normal. The residual volume was 2.15 liters (85% of predicted), which was normal. The ratio of residual volume to total lung capacity (RV/TLC) was 38, which was normal.     Diffusion capacity was found to be 92% which is Normal.      Interpretation:  Normal PFT study.     Comments:

## 2020-09-22 NOTE — PROGRESS NOTES
Pt resting quietly in bed, awakens to voice, denies pain. VSS, O2 sats 98% on 3 L NC. Pt in sinus bradycardia with HR in 50s, no PVCs noted. Right groin soft, dressing dry. Right pedal and posterior tibial pulses palpable. Pt seen by anesthesia, OK with transferring pt to CVU at this time. Will transfer.

## 2020-09-22 NOTE — PROGRESS NOTES
Patient admitted to CVU from PACU and attached to CVU monitors. Report received from PACU RN. Hemodynamics stable and will continue to monitor. Right groin soft, no oozing, and covered with tegaderm on site. Patient oriented to room and call light within reach. Family let back to see patient. Visiting hours reviewed and all questions answered.

## 2020-09-22 NOTE — DISCHARGE SUMMARY
EP Discharge Summary  [facility]    Patient :Que Cabrera  Room/Bed: IV-8110/8879-89  Medical Record: [id number]  YOB: 1945    Admit date: 9/22/2020  Discharge date: 09/23/20     Admitting Physician: Mora Navarro MD   Discharge NP: Fish Mann CNP    Admission Diagnoses: Ventricular premature depolarization [I49.3]  PVC (premature ventricular contraction) [I49.3]  Discharge Diagnoses: PVC    Discharged Condition: good    Hospital Course: The patient is a 66 y.o. male with a past medical history of HTN, HLD, CVA, DM, CHF, AUDIE, and RBBB. In July of 2019, he was admitted for fatigue and right sided weakness. He was diagnosed with TIA and started on ASA/plavix. S/p ILR for cryptogenic stroke (10/14/19)    Interval HX: Patient presented for elective cardiac ablation. S/p PVC ablation yesterday with Dr. Yoel Chirinos. Uneventful post operative course. Right groin ablation site soft, no hematoma/ooozing/swelling noted. Up in chair, ambulating in room without issue. He has urinated post catheter removal.    Patient seen and examined. Notes, labs, and recent testing reviewed. Telemetry reviewed, pt in NSR/SB with occasional PVC  No new complaints today and no major events overnight. Denies having chest pain, palpitations, shortness of breath, edema or dizziness at the time of this visit. Consults: none    Objective:   /75   Pulse 66   Temp 97 °F (36.1 °C) (Temporal)   Resp 18   Ht 5' 8\" (1.727 m)   Wt 234 lb 2.1 oz (106.2 kg)   SpO2 100%   BMI 35.60 kg/m²      Intake/Output Summary (Last 24 hours) at 9/23/2020 6107  Last data filed at 9/22/2020 2100  Gross per 24 hour   Intake 1190 ml   Output 735 ml   Net 455 ml       Physical Exam:  Telemetry: SB/NSR with occasional PVC  General: Alert & Oriented x4 in no distress  Skin: Warm and dry, no cyanosis or bruising  Neck: JVD <8, no bruit  Respiratory: Respirations symmetric and unlabored.  Lungs clear to auscultation bilaterally, no wheezing, rhonchi, or crackles  Cardiovascular: Regular rate and rhythm. S1/S2 present without murmurs, rubs, or gallops. Abdomen: Soft, nontender, +bowel sounds  Extremities: No edema. Right groin ablation site soft, no hematoma/swelling/oozing noted. Significant Diagnostic Studies:     Labs  CBC:   Lab Results   Component Value Date    WBC 6.8 09/22/2020    HGB 14.1 09/22/2020    HCT 41.2 09/22/2020    MCV 93.4 09/22/2020     09/22/2020     BMP:   Lab Results   Component Value Date     09/22/2020    K 3.6 09/22/2020    K 4.0 07/03/2020     09/22/2020    CO2 20 09/22/2020    PHOS 3.5 12/01/2014    BUN 18 09/22/2020    CREATININE 1.2 09/22/2020    GFRAA >60 09/22/2020    GFRAA >60 06/04/2013    MG 2.10 07/02/2020      Estimated Creatinine Clearance: 61 mL/min (based on SCr of 1.2 mg/dL).      Thyroid:   Lab Results   Component Value Date    TSH 1.00 08/06/2020     Lipids:  Lab Results   Component Value Date    CHOL 112 07/03/2020    HDL 38 07/03/2020    HDL 50 12/14/2011    TRIG 173 07/03/2020     LFTS:   Lab Results   Component Value Date    ALT 14 07/03/2020    AST 17 07/03/2020    ALKPHOS 83 07/03/2020    PROT 6.2 07/03/2020    PROT 7.5 12/04/2012    AGRATIO 1.2 07/03/2020    BILITOT 0.3 07/03/2020     Cardiac Enzymes:   Lab Results   Component Value Date    TROPONINI <0.01 07/03/2020    TROPONINI <0.01 07/03/2020    TROPONINI <0.01 07/02/2020     Coags:   Lab Results   Component Value Date    PROTIME 10.7 07/02/2020    INR 0.92 07/02/2020       Disposition: home    Home Medications:   Haydee Gray   Home Medication Instructions MONIQUE:[id number]    Printed on:09/23/20 2390   Medication Information                      aspirin 81 MG tablet  Take 81 mg by mouth daily             b complex vitamins capsule  Take 1 capsule by mouth daily             Cholecalciferol (VITAMIN D) 2000 UNITS CAPS capsule  Take 1 capsule by mouth daily              clopidogrel (PLAVIX) 75 MG tablet  Take 1 tablet scheduled     3. Bradycardia              - Noted on loop during morning/sleeping hours              - Asymptomatic              - Avoid CCB/BB              - Continue to monitor     4. HTN   - Controlled, on low side: Goal <130/80   - Encouraged to monitor and log BP readings at home, then bring log to next visit   - Discussed importance of low sodium diet, weight control and exercise     5. CAD   - Mild LAD lesion per [facility] (10/19)   - Stable   - No complaints of angina   - Continue ASA, ARB, and statin     6. Chronic diastolic heart failure (NYHA Class II)   - Appears compensated               ~ EF 55% per echo; Grade I DD (7/19)   - Continue with HCTZ 25 mg QD, losartan 100 mg QD    ~ No BB due to borderline HR   - Will give one time dose of lasix prior to d/c   - Aggressive medical therapy with risk factor modification   - Discussed with patient importance of monitoring weight, low sodium diet and fluid restriction     7. Hx of Stroke              - On ASA/Plavix/statin              - Followed by neurology     8. AUDIE   - Stable: Uses CPAP   - Encourage to use CPAP to prevent long term effects of untreated AUDIE     Overall the patient is stable for discharge from a CV standpoint    Diet & Exercise:   The patient is counseled to follow a low salt diet to assure blood pressure remains controlled for cardiovascular risk factor modification   The patient is counseled to avoid excess caffeine, and energy drinks as this may exacerbated ectopy and arrhythmia   The patient is counseled to lose weight to control cardiovascular risk factors   Exercise program discussed: To improve overall cardiovascular health, the patient is instructed to increase cardiovascular related activities with a goal of 150 min/week of moderate level activity or 10,000 steps per day.  Encouraged to perform as much activity as tolerated    EF of 22-80%  ACEi for systolic HF: N/A  ASA and Statin for CAD: Yes  Anticoagulation for AF and heart failure: N/A  Tobacco use was discussed with the patient and education provided: N/A  Documentation sent to PCP: Note sent to PCP office    Activity: See discharge instructions  Diet: cardiac diet  Wound Care: See discharge instructions    F/U:   1. Follow-up with EP NP in 6 weeks with device check  -Call [facility] at [phone] with any questions    Signed:  Kala Bailey CNP  9/23/2020  9:52 AM    Total time spent on day of discharge including face-to-face visit, examination, documentation, counseling, preparation of discharge plans and follow-up, and discharge medicine reconciliation and prescriptions is >31 minutes

## 2020-09-22 NOTE — ANESTHESIA POSTPROCEDURE EVALUATION
Department of Anesthesiology  Postprocedure Note    Patient: Lian Ma  MRN: [id number]  YOB: 1945  Date of evaluation: 9/22/2020  Time:  1:13 PM     Procedure Summary     Date:  09/22/20 Room / Location:  [facility] Cath Lab    Anesthesia Start:  0732 Anesthesia Stop:  1494    Procedure:  ABLATION Diagnosis:       Ventricular premature depolarization      PVC (premature ventricular contraction)    Scheduled Providers:   Responsible Provider:  Kulwant Schmidt MD    Anesthesia Type:  general ASA Status:  3          Anesthesia Type: general    Mikayla Phase I: Mikayla Score: 8    Mikayla Phase II:      Last vitals: Reviewed and per EMR flowsheets.        Anesthesia Post Evaluation    Patient location during evaluation: PACU  Patient participation: complete - patient participated  Level of consciousness: awake and alert  Airway patency: patent  Nausea & Vomiting: no nausea and no vomiting  Complications: no  Cardiovascular status: hemodynamically stable  Respiratory status: acceptable  Hydration status: stable

## 2020-09-22 NOTE — H&P
[facility]       Electrophysiology          CC: Fatigue/PVC's    HPI: Sincere Nielsen is a 66 y.o. Male with a PMH of HTN, RBBB, HLD and Type 2 diabetes. On 7/25/19 he presented to [facility] ED with complaints of fatigue and right sided weakness. Was dx with TIA and discharged on plavix and asa with a 30 day monitor. 10/14/19 ILR insertion for cryptogenic stroke    He has a new diagnosis of severe AUDIE and uses CPAP    Today's interrogation of loop monitor shows frequent PVC's    Interval history:  He states he is doing OK.   He does get dyspnea on exertion     Past Medical History:   Diagnosis Date    BPH     Colon polyps     DDD (degenerative disc disease), lumbar     Diverticulosis     Gallstone pancreatitis 09/05/2017    GERD (gastroesophageal reflux disease)     Hyperlipidemia     Hypertension     Impaired fasting blood sugar     Kidney stones 11/10    Lumbar spondylosis     Mild CAD 10/14/2019    Dr. Madisyn Looney, [facility]    Mild CAD 11/13/2019    OAB (overactive bladder)     AUDIE (obstructive sleep apnea) 6/4/2013    Osteoarthritis     Osteoarthritis of right knee     Pneumonia     pneumonia    RBBB 12/19/2016    Renal cyst     Retinal vasculitis     Retinal vasculitis     Right hemiparesis ([address] 7/25/2019    Right ureteral stone     Type 2 diabetes mellitus without complication, without long-term current use of insulin ([facility])     diet controlled    Type 2 diabetes mellitus without complication, without long-term current use of insulin ([address]         Past Surgical History:   Procedure Laterality Date    APPENDECTOMY      BONE RESECTION Left     bone spur removal left elbow    CARDIAC CATHETERIZATION  3/23/2012    CATARACT REMOVAL WITH IMPLANT Left 11/01/2017    CATARACT REMOVAL WITH IMPLANT Right 10/2017    CHOLECYSTECTOMY, LAPAROSCOPIC  09/07/2017    COLONOSCOPY  7/2008    multiple    COLONOSCOPY N/A 1/15/2020    COLONOSCOPY POLYPECTOMY SNARE/COLD BIOPSY performed by Sarah Ribeiro MD at [address]  12/2/2014    Retrograde Pyelogram, stent, Dr. Ivan Khan N/A 12/13/2018    CYSTOSCOPY, LEFT RETROGRADE PYELOGRAM, LEFT STENT PLACEMENT performed by Angi Sawant MD at [facility] 1/2/2019    CYSTOSCOPY LEFT URETEROSCOPY HOLMIUM LASER LITHOTRIPSY, STONE MANIPULATION WITH LEFT STENT EXCHANGE performed by Angi Sawant MD at [facility]      RIGHT COLECTOMY  3/28/2012    laparoscopic    TONSILLECTOMY         Allergies:  No Known Allergies    Social History:   reports that he quit smoking about 42 years ago. His smoking use included cigarettes. He quit after 16.00 years of use. He has never used smokeless tobacco. He reports current alcohol use of about 4.0 standard drinks of alcohol per week. He reports that he does not use drugs. Family History:  family history includes Cancer in his father; Diabetes in his brother; Mental Illness in his mother; Other in his sister; Sleep Apnea in his brother. Reviewed. Denies family history of sudden cardiac death, arrhythmia, premature CAD    Review of System:  All other systems reviewed and are negative except for that noted above. Pertinent negatives are:   General: negative for fever, chills   Ophthalmic ROS: negative for - eye pain or loss of vision  ENT ROS: negative for - headaches, sore throat   Respiratory: negative for - cough, sputum  Cardiovascular: Reviewed in HPI  Gastrointestinal: negative for - abdominal pain, diarrhea, N/V  Hematology: negative for - bleeding, blood clots, bruising or jaundice  Genito-Urinary:  negative for - Dysuria or incontinence  Musculoskeletal: negative for - Joint swelling, muscle pain  Neurological: negative for - confusion, dizziness, headaches   Psychiatric: No anxiety, no depression.   Dermatological: negative for - rash    Physical Examination:  Vitals:    02/20/20 1308   BP: 114/79   Pulse: 61   Resp: 20      Wt Readings from Last 3 Encounters:   20 232 lb (105.2 kg)   20 225 lb (102.1 kg)   01/15/20 229 lb (103.9 kg)       · Constitutional: Oriented. No distress. · Head: Normocephalic and atraumatic. · Mouth/Throat: Oropharynx is clear and moist.   · Eyes: Conjunctivae normal. EOM are normal.   · Neck: Neck supple. No rigidity. No JVD present. · Cardiovascular: Normal rate, regular rhythm, S1&S2. · Pulmonary/Chest: Bilateral respiratory sounds. No wheezes, No rhonchi. · Abdominal: Soft. Bowel sounds present. No distension, No tenderness. · Musculoskeletal: No tenderness. No edema    · Lymphadenopathy: Has no cervical adenopathy. · Neurological: Alert and oriented. Cranial nerve appears intact, No Gross deficit   · Skin: Skin is warm and dry. No rash noted. · Psychiatric: Has a normal behavior       Labs, diagnostic and imaging results reviewed. Reviewed. Lab Results   Component Value Date    TSH 1.36 2019    TSH 1.20 2019    CREATININE 1.2 01/10/2020    CREATININE 1.2 10/14/2019    CREATININE 1.4 2019    AST 16 01/10/2020    ALT 17 01/10/2020       EC20 Sinus Rhythm with frequent PVC's  QTcH 441    MCOT:  -19  Sinus rhythm with frequent PVC's  PVC burden 25%  PAC burden 3%  High   Low HR 42  Average HR 69    Echo: 19  Summary   -Arrhythmia noted.   -Normal left ventricle size and systolic function with an estimated ejection   fraction of 55%. There is concentric left ventricular hypertrophy. Grade I   diastolic dysfunction with elevated LV filling pressures.   -Mitral annular calcification is present.   -Mild mitral regurgitation.   -Aortic valve appears sclerotic but opens adequately.   -Mild tricuspid regurgitation with PASP of 34 mmHg    Nuclear stress:  10/4/19  Summary   No EKG evidence for ischemia with lexiscan   Normal LV size and systolic function.   Myocardial perfusion imaging with small area of decreased uptake in the   inferoapical wall with stress and rest consistent with scar in the territory   typical of the RCA, Cx or LAD arteries.   No significant reversible ischemia    Cath: 10/14/19   LM       Normal              LAD     20% mid              Cx        Normal              RCA     Normal, nondominant              LVG     Not performed              EDP     12 mmHg    Medication:  Current Outpatient Medications   Medication Sig Dispense Refill    Cholecalciferol (VITAMIN D3) 125 MCG (5000 UT) CAPS Take 1 capsule by mouth daily 25 mg      clopidogrel (PLAVIX) 75 MG tablet Take 75 mg by mouth daily      mometasone (NASONEX) 50 MCG/ACT nasal spray 2 sprays by Each Nostril route daily 3 Inhaler 3    pravastatin (PRAVACHOL) 80 MG tablet Take 1 tablet by mouth every evening 90 tablet 3    ezetimibe (ZETIA) 10 MG tablet Take 1 tablet by mouth daily 90 tablet 3    amLODIPine (NORVASC) 5 MG tablet Take 1 tablet by mouth daily 90 tablet 3    losartan (COZAAR) 100 MG tablet Take 1 tablet by mouth daily 90 tablet 3    b complex vitamins capsule Take 1 capsule by mouth daily      omeprazole (PRILOSEC) 40 MG delayed release capsule Take 1 capsule by mouth daily 90 capsule 3    clopidogrel (PLAVIX) 75 MG tablet Take 1 tablet by mouth daily 90 tablet 3    solifenacin (VESICARE) 10 MG tablet Take 10 mg by mouth daily       tamsulosin (FLOMAX) 0.4 MG capsule Take 1 capsule by mouth daily 90 capsule 3    aspirin 81 MG tablet Take 81 mg by mouth daily      Glucosamine HCl-MSM (GLUCOSAMINE-MSM PO) Take 1 tablet by mouth 2 times daily       Cholecalciferol (VITAMIN D) 2000 UNITS CAPS capsule Take 1 capsule by mouth daily        No current facility-administered medications for this visit.         Assessment and plan:   PVC's   Ventricular bigeminy by ECG today   25 % PVC burden on MCOT 8/22/19 and frequent on Implantable Loop recorder and ECG     Could be causing the symptoms of fatigue and shortness of breath    No significant

## 2020-09-22 NOTE — ANESTHESIA PRE PROCEDURE
07/03/2020    HGB 13.7 07/03/2020    HCT 40.4 07/03/2020    MCV 94.2 07/03/2020    RDW 14.2 07/03/2020     07/03/2020       CMP:   Lab Results   Component Value Date     07/03/2020    K 4.0 07/03/2020     07/03/2020    CO2 21 07/03/2020    BUN 19 07/03/2020    CREATININE 1.5 07/03/2020    GFRAA 55 07/03/2020    GFRAA >60 06/04/2013    AGRATIO 1.2 07/03/2020    LABGLOM 45 07/03/2020    GLUCOSE 149 07/03/2020    PROT 6.2 07/03/2020    PROT 7.5 12/04/2012    CALCIUM 8.9 07/03/2020    BILITOT 0.3 07/03/2020    ALKPHOS 83 07/03/2020    AST 17 07/03/2020    ALT 14 07/03/2020       POC Tests: No results for input(s): POCGLU, POCNA, POCK, POCCL, POCBUN, POCHEMO, POCHCT in the last 72 hours. Coags:   Lab Results   Component Value Date    PROTIME 10.7 07/02/2020    INR 0.92 07/02/2020    APTT 28.5 07/25/2019       HCG (If Applicable): No results found for: PREGTESTUR, PREGSERUM, HCG, HCGQUANT     ABGs: No results found for: PHART, PO2ART, HII0LZT, DGO5THY, BEART, C0VHEGVQ     Type & Screen (If Applicable):  No results found for: LABABO, LABRH    Drug/Infectious Status (If Applicable):  No results found for: HIV, HEPCAB    COVID-19 Screening (If Applicable):   Lab Results   Component Value Date    COVID19 NOT DETECTED 09/15/2020         Anesthesia Evaluation  Patient summary reviewed and Nursing notes reviewed no history of anesthetic complications:   Airway: Mallampati: II  TM distance: >3 FB   Neck ROM: full  Mouth opening: > = 3 FB Dental:    (+) caps      Pulmonary:   (+) sleep apnea:      (-) asthma and shortness of breath                           Cardiovascular:    (+) hypertension:, CAD:, hyperlipidemia    (-)  angina          Echocardiogram reviewed                  Neuro/Psych:      (-) CVA           GI/Hepatic/Renal:   (+) GERD:,      (-) liver disease       Endo/Other:    (+) DiabetesType II DM, , : arthritis: OA., .    (-) hypothyroidism               Abdominal:           Vascular:     - PVD. Anesthesia Plan      general     ASA 3       Induction: intravenous. MIPS: Postoperative opioids intended and Prophylactic antiemetics administered. Anesthetic plan and risks discussed with patient. Plan discussed with CRNA.                   Lance Smallwood MD   9/22/2020

## 2020-09-22 NOTE — PROCEDURES
and ablation:   We mapped the RV for PVC which showed a wide area. Then we accessed Rt FA and using a long sheath advanced the catheter to LV and aortic root. Patient had occasionally bigeminy pattern with PVC with morphology LBBB with transition in V2-V3,  , likely septal   PVC. A template of this PVC was acquired and saved. A 3D electro anatomical map of the RVOT using Carto system and 4 mm irrigated ablation catheter was created. Earliest activation point on the septal part of the LVOT was identified slightly above the LBBB recording. Using 28 W, irrigated tip RFCA lesion on this location causes   termination of PVC. This resulted in elimination of PVC and bigeminy pattern. However, it recurred and several other lesions were given. Of note we had juctional rhythm when applying energy at times and therefore would stop due to proximity to conduction system. Following ablation, programmed stimulation did not induce any VT. Then patient was started on isuprel which was increased to 3 mcg, and programmed stimulation was performed again. No PVC was noted. We advanced the ablation catheter to coronary sinus for sensing and pacing. Baseline measurements and programmed stimulation with up to three ventricular extrastimuli up to 200 msec and burst pacing. AH: 60 msec  HV: 69 msec  1:1 antegrade conduction over AV node (AV BCL) was 827 msec   AVN ERP is 600/460 msec        The patient tolerated the procedure well and there were no complications. At the end of the procedure, using ICE we confirmed the lack of any pericardial effusion. Closure device was used for all access     Patient was extubated. Patient was transferred to the holding area in stable condtion. EBL less than 20 ml. Conclusion:   Successful ablation of PVC from LV outflow    Plan:   The patient will be observed and receive usual post ablation care.

## 2020-09-23 VITALS
HEIGHT: 68 IN | DIASTOLIC BLOOD PRESSURE: 75 MMHG | OXYGEN SATURATION: 100 % | TEMPERATURE: 97 F | WEIGHT: 234.13 LBS | BODY MASS INDEX: 35.48 KG/M2 | HEART RATE: 66 BPM | SYSTOLIC BLOOD PRESSURE: 129 MMHG | RESPIRATION RATE: 18 BRPM

## 2020-09-23 PROBLEM — I49.8 VENTRICULAR BIGEMINY: Status: RESOLVED | Noted: 2020-07-21 | Resolved: 2020-09-23

## 2020-09-23 PROCEDURE — 6360000002 HC RX W HCPCS

## 2020-09-23 PROCEDURE — 99217 PR OBSERVATION CARE DISCHARGE MANAGEMENT: CPT | Performed by: NURSE PRACTITIONER

## 2020-09-23 PROCEDURE — 6370000000 HC RX 637 (ALT 250 FOR IP): Performed by: INTERNAL MEDICINE

## 2020-09-23 RX ORDER — FUROSEMIDE 10 MG/ML
40 INJECTION INTRAMUSCULAR; INTRAVENOUS ONCE
Status: COMPLETED | OUTPATIENT
Start: 2020-09-23 | End: 2020-09-23

## 2020-09-23 RX ORDER — FUROSEMIDE 10 MG/ML
INJECTION INTRAMUSCULAR; INTRAVENOUS
Status: COMPLETED
Start: 2020-09-23 | End: 2020-09-23

## 2020-09-23 RX ADMIN — FUROSEMIDE 40 MG: 10 INJECTION, SOLUTION INTRAMUSCULAR; INTRAVENOUS at 09:57

## 2020-09-23 RX ADMIN — ASPIRIN 81 MG: 81 TABLET, COATED ORAL at 08:06

## 2020-09-23 RX ADMIN — FUROSEMIDE 40 MG: 10 INJECTION INTRAMUSCULAR; INTRAVENOUS at 09:57

## 2020-09-23 RX ADMIN — HYDROCHLOROTHIAZIDE 25 MG: 25 TABLET ORAL at 08:05

## 2020-09-23 RX ADMIN — TROSPIUM CHLORIDE 20 MG: 20 TABLET, FILM COATED ORAL at 08:05

## 2020-09-23 RX ADMIN — CLOPIDOGREL BISULFATE 75 MG: 75 TABLET ORAL at 08:06

## 2020-09-23 RX ADMIN — LOSARTAN POTASSIUM 100 MG: 100 TABLET, FILM COATED ORAL at 08:05

## 2020-09-23 RX ADMIN — VITAMIN D, TAB 1000IU (100/BT) 2000 UNITS: 25 TAB at 08:07

## 2020-09-23 RX ADMIN — TAMSULOSIN HYDROCHLORIDE 0.4 MG: 0.4 CAPSULE ORAL at 08:05

## 2020-09-23 RX ADMIN — PANTOPRAZOLE SODIUM 40 MG: 40 TABLET, DELAYED RELEASE ORAL at 08:05

## 2020-09-23 RX ADMIN — FLUTICASONE PROPIONATE 1 SPRAY: 50 SPRAY, METERED NASAL at 07:56

## 2020-09-23 ASSESSMENT — PAIN SCALES - GENERAL
PAINLEVEL_OUTOF10: 0

## 2020-09-23 NOTE — PROGRESS NOTES
Calles catheter removed without incident. No drainage around catheter. Stat-lock removed with alcohol. Patient tolerated procedure well. Urinal at bedside. Patient instructed that he would need to use urinal to allow nurse to visualize urine output after calles removal.  Verbalized understanding.

## 2020-09-23 NOTE — PROGRESS NOTES
4 Eyes Skin Assessment     The patient is being assess for  Cath Lab Post-Op    I agree that 2 RN's have performed a thorough Head to Toe Skin Assessment on the patient. ALL assessment sites listed below have been assessed. Areas assessed by both nurses:   [x]   Head, Face, and Ears   [x]   Shoulders, Back, and Chest  [x]   Arms, Elbows, and Hands   [x]   Coccyx, Sacrum, and IschIum  [x]   Legs, Feet, and Heels        Does the Patient have Skin Breakdown?   No         Martin Prevention initiated:  No   Wound Care Orders initiated:  No      [facility] nurse consulted for Pressure Injury (Stage 3,4, Unstageable, DTI, NWPT, and Complex wounds), New and Established Ostomies:  No      Nurse 1 eSignature: Electronically signed by Wei Reich RN on 9/22/20 at 8:34 PM EDT    **SHARE this note so that the co-signing nurse is able to place an eSignature**    Nurse 2 eSignature: Electronically signed by Julia Cano RN on 9/23/20 at 12:42 AM EDT

## 2020-10-11 NOTE — PROGRESS NOTES
[address]  Advanced CHF/Pulmonary Hypertension   Cardiac Evaluation      Claude Spring  YOB: 1945    Date of Visit:  10/12/20    Chief Complaint   Patient presents with    3 Month Follow-Up    Congestive Heart Failure    Other     body itching      History of Present Illness:  Claude Spring is a 66 y.o. male who presents as a referral from my partner Dr. Grace Mcqueen for consultation and management of heart failure symptoms. His history includes HTN, HLD, RBBB, DM. He also was diagnosed with severe AUDIE in 2019 and uses a C-pap.  7/25/2019, he was seen in [facility] ER for fatigue and right sided weakness; he was diagnosed with a TIA and discharged on Plavix & asa. MCOT 8/2019 showed NSR with frequent PVC's (25% burden). 10/14/2019, he had ILR insertion for cryptogenic stroke. An LHC (Dr. Rhona Marquez) same day showed very mild CAD to the LAD. On 9-, he ad a PVC ablation per Dr. Grace Mcqueen. Today he is here with his wife. He had a PVC ablation on 9-22 per Dr. Grace Mcqueen. He states he could not feel the PVC's before he had the ablation. His weight has gradually been decreasing. Discussed EKG today. He is taking HCTZ 5 days a week skipping Mon and Friday. His wife has noticed that his color and breathing have improved since the ablation. He has itching due to his eczema. His wife states that they are due to go back to the skin specialist.      EKG today read by me shows NSR rate 62 with PAC.       No Known Allergies  Current Outpatient Medications   Medication Sig Dispense Refill    amLODIPine (NORVASC) 5 MG tablet Take 5 mg by mouth daily      tamsulosin (FLOMAX) 0.4 MG capsule Take 1 capsule by mouth 2 times daily (Patient taking differently: Take 0.4 mg by mouth 2 times daily Indications: prn ) 60 capsule 0    omeprazole (PRILOSEC) 40 MG delayed release capsule Take 1 capsule by mouth daily 90 capsule 3    clopidogrel (PLAVIX) 75 MG tablet Take 1 tablet by mouth daily 90 tablet 1    hydrochlorothiazide (HYDRODIURIL) 25 MG tablet Take 1 tablet by mouth daily (Patient taking differently: Take 25 mg by mouth Five times weekly Skip Monday and Friday.) 90 tablet 3    mometasone (NASONEX) 50 MCG/ACT nasal spray 2 sprays by Each Nostril route daily (Patient taking differently: 2 sprays by Each Nostril route daily as needed ) 3 Inhaler 3    pravastatin (PRAVACHOL) 80 MG tablet Take 1 tablet by mouth every evening 90 tablet 3    ezetimibe (ZETIA) 10 MG tablet Take 1 tablet by mouth daily 90 tablet 3    losartan (COZAAR) 100 MG tablet Take 1 tablet by mouth daily 90 tablet 3    b complex vitamins capsule Take 1 capsule by mouth daily      solifenacin (VESICARE) 10 MG tablet Take 10 mg by mouth daily       aspirin 81 MG tablet Take 81 mg by mouth daily      Cholecalciferol (VITAMIN D) 2000 UNITS CAPS capsule Take 1 capsule by mouth daily       Glucosamine HCl-MSM (GLUCOSAMINE-MSM PO) Take 1 tablet by mouth 2 times daily        No current facility-administered medications for this visit.         Past Medical History:   Diagnosis Date    BPH     Colon polyps     DDD (degenerative disc disease), lumbar     Diverticulosis     Gallstone pancreatitis 09/05/2017    GERD (gastroesophageal reflux disease)     Hyperlipidemia     Hypertension     Impaired fasting blood sugar     Kidney stones 11/10    Lumbar spondylosis     Mild CAD 10/14/2019    [facility]    Mild CAD 11/13/2019    OAB (overactive bladder)     AUDIE (obstructive sleep apnea) 6/4/2013    Osteoarthritis     Osteoarthritis of right knee     Pneumonia     pneumonia    RBBB 12/19/2016    Renal cyst     Retinal vasculitis     Retinal vasculitis     Right hemiparesis ([facility] [address] 7/25/2019    Right ureteral stone     Type 2 diabetes mellitus without complication, without long-term current use of insulin ([facility])     diet controlled    Type 2 diabetes mellitus without complication, without long-term current use of insulin ([address] Past Surgical History:   Procedure Laterality Date    APPENDECTOMY      BONE RESECTION Left     bone spur removal left elbow    CARDIAC CATHETERIZATION  3/23/2012    CATARACT REMOVAL WITH IMPLANT Left 2017    CATARACT REMOVAL WITH IMPLANT Right 10/2017    CHOLECYSTECTOMY, LAPAROSCOPIC  2017    COLONOSCOPY  2008    multiple    COLONOSCOPY N/A 1/15/2020    COLONOSCOPY POLYPECTOMY SNARE/COLD BIOPSY performed by Bettina Ceja MD at [address] [place] [address] Road  2014    Retrograde Pyelogram, stent, Dr. Yesika Patton N/A 2018    CYSTOSCOPY, LEFT RETROGRADE PYELOGRAM, LEFT STENT PLACEMENT performed by Christoph Arthur MD at [address] [place] [address] 2019    CYSTOSCOPY LEFT URETEROSCOPY HOLMIUM LASER LITHOTRIPSY, STONE MANIPULATION WITH LEFT STENT EXCHANGE performed by Christoph Arthur MD at [facility]      RIGHT COLECTOMY  3/28/2012    laparoscopic    TONSILLECTOMY       Family History   Problem Relation Age of Onset    Mental Illness Mother         Alzheimers    Cancer Father         Leukemia    Diabetes Brother     Sleep Apnea Brother     Other Sister         pyloric valve repacement     Social History     Socioeconomic History    Marital status:      Spouse name: Kumar Rosas Number of children: 3    Years of education: 12    Highest education level: Not on file   Occupational History    Not on file   Social Needs    Financial resource strain: Not on file    Food insecurity     Worry: Not on file     Inability: Not on file   [employer] needs     Medical: Not on file     Non-medical: Not on file   Tobacco Use    Smoking status: Former Smoker     Years: 16.00     Types: Cigarettes     Last attempt to quit: 1977     Years since quittin.4    Smokeless tobacco: Never Used    Tobacco comment: smoked for 12 ys / smoked up to 1 ppd   Substance and Sexual Activity    Alcohol use:  Yes Alcohol/week: 4.0 standard drinks     Types: 1 Glasses of wine, 1 Cans of beer, 2 Standard drinks or equivalent per week     Comment: ONCE A WEEK    Drug use: Never    Sexual activity: Yes     Partners: Female   Lifestyle    Physical activity     Days per week: Not on file     Minutes per session: Not on file    Stress: Not on file   Relationships    Social connections     Talks on phone: Not on file     Gets together: Not on file     Attends Rastafari service: Not on file     Active member of club or organization: Not on file     Attends meetings of clubs or organizations: Not on file     Relationship status: Not on file    Intimate partner violence     Fear of current or ex partner: Not on file     Emotionally abused: Not on file     Physically abused: Not on file     Forced sexual activity: Not on file   Other Topics Concern    Not on file   Social History Narrative    Not on file       Review of Systems:   · Constitutional: there has been no unanticipated weight loss. There's been no change in energy level, sleep pattern, or activity level. · Eyes: No visual changes or diplopia. No scleral icterus. · ENT: No Headaches, hearing loss or vertigo. No mouth sores or sore throat. · Cardiovascular: Reviewed in HPI  · Respiratory: No cough or wheezing, no sputum production. No hematemesis. · Gastrointestinal: No abdominal pain, appetite loss, blood in stools. No change in bowel or bladder habits. · Genitourinary: No dysuria, trouble voiding, or hematuria. · Musculoskeletal:  No gait disturbance, weakness or joint complaints. · Integumentary: No rash or pruritis. · Neurological: No headache, diplopia, change in muscle strength, numbness or tingling. No change in gait, balance, coordination, mood, affect, memory, mentation, behavior. · Psychiatric: No anxiety, no depression. · Endocrine: No malaise, fatigue or temperature intolerance. No excessive thirst, fluid intake, or urination.  No tremor. · Hematologic/Lymphatic: No abnormal bruising or bleeding, blood clots or swollen lymph nodes. · Allergic/Immunologic: No nasal congestion or hives. Physical Examination:    Vitals:    10/12/20 0807   BP: 120/80   Site: Left Upper Arm   Position: Sitting   Cuff Size: Medium Adult   Pulse: 66   SpO2: 96%   Weight: 231 lb (104.8 kg)   Height: 5' 8\" (1.727 m)     Body mass index is 35.12 kg/m². Wt Readings from Last 3 Encounters:   10/12/20 231 lb (104.8 kg)   09/23/20 234 lb 2.1 oz (106.2 kg)   09/16/20 234 lb 6.4 oz (106.3 kg)     BP Readings from Last 3 Encounters:   10/12/20 120/80   09/23/20 129/75   09/22/20 103/65     Constitutional and General Appearance:   WD/WN in NAD, mildly obese  HEENT:  NC/AT  NUBIA  No problems with hearing  Skin:  Warm, dry  Respiratory:  · Normal excursion and expansion without use of accessory muscles  · Resp Auscultation: Normal breath sounds without dullness  Cardiovascular:  · The apical impulses not displaced  · Heart tones are crisp and normal  · Cervical veins are not engorged  · The carotid upstroke is normal in amplitude and contour without delay or bruit  · JVP less than 8 cm H2O  RRR with nl S1 and S2 without m,r,g  · Peripheral pulses are symmetrical and full  · There is no clubbing, cyanosis of the extremities.   · No edema  · Femoral Arteries: 2+ and equal  · Pedal Pulses: 2+ and equal   Neck:  · No thyromegaly  Abdomen:  · No masses or tenderness  · Liver/Spleen: No Abnormalities Noted  Neurological/Psychiatric:  · Alert and oriented in all spheres  · Moves all extremities well  · Exhibits normal gait balance and coordination  · No abnormalities of mood, affect, memory, mentation, or behavior are noted        Procedure performed: PVC Ablation per Dr. Low Leong 9-  Indications for procedure:    Jennifer Burch is 66 y.o. male with frequent symptomatic PVC  · Electrophysiological study with ablation of left  Outflow PVCs  · Attempted induction of arrhythmia after IV drug infusion. · Three-dimensional electroanatomic mapping of the left ventricle   · Intracardiac ultrasound   · Left atrial recording and mapping via coronary sinus     EC20 Sinus Rhythm with frequent PVC's  QTcH 441     MCOT:  -19  Sinus rhythm with frequent PVC's  PVC burden 25%  PAC burden 3%  High   Low HR 42  Average HR 69     Echo: 19  Summary   -Arrhythmia noted.   -Normal left ventricle size and systolic function with an estimated ejection   fraction of 55%. There is concentric left ventricular hypertrophy. Grade I   diastolic dysfunction with elevated LV filling pressures.   -Mitral annular calcification is present.   -Mild mitral regurgitation.   -Aortic valve appears sclerotic but opens adequately.   -Mild tricuspid regurgitation with PASP of 34 mmHg     Nuclear stress:  10/4/19  Summary   No EKG evidence for ischemia with lexiscan   Normal LV size and systolic function.   Myocardial perfusion imaging with small area of decreased uptake in the   inferoapical wall with stress and rest consistent with scar in the territory   typical of the RCA, Cx or LAD arteries.   No significant reversible ischemia     Cath: 10/14/19   LM       Normal              LAD     20% mid              Cx        Normal              RCA     Normal, nondominant              LVG     Not performed  [facility] were reviewed including labs from other hospital systems through [facility]. Cardiac testing was reviewed including echos, nuclear scans, cardiac catheterization, including from other hospital systems through [facility]. Assessment:   1. Chronic diastolic heart failure ([address]    2. PVC (premature ventricular contraction)    3. TIA (transient ischemic attack)    4. Benign essential HTN    5. SOB (shortness of breath)         1. Chronic diastolic heart failure ([address]):  Compensated by exam.  -Continue HCTZ, Losartan.         2. PVC (premature

## 2020-10-12 ENCOUNTER — OFFICE VISIT (OUTPATIENT)
Dept: CARDIOLOGY CLINIC | Age: 75
End: 2020-10-12
Payer: MEDICARE

## 2020-10-12 VITALS
BODY MASS INDEX: 35.01 KG/M2 | WEIGHT: 231 LBS | HEIGHT: 68 IN | OXYGEN SATURATION: 96 % | HEART RATE: 66 BPM | SYSTOLIC BLOOD PRESSURE: 120 MMHG | DIASTOLIC BLOOD PRESSURE: 80 MMHG

## 2020-10-12 PROCEDURE — 1036F TOBACCO NON-USER: CPT | Performed by: INTERNAL MEDICINE

## 2020-10-12 PROCEDURE — G8484 FLU IMMUNIZE NO ADMIN: HCPCS | Performed by: INTERNAL MEDICINE

## 2020-10-12 PROCEDURE — 1123F ACP DISCUSS/DSCN MKR DOCD: CPT | Performed by: INTERNAL MEDICINE

## 2020-10-12 PROCEDURE — G8427 DOCREV CUR MEDS BY ELIG CLIN: HCPCS | Performed by: INTERNAL MEDICINE

## 2020-10-12 PROCEDURE — 99214 OFFICE O/P EST MOD 30 MIN: CPT | Performed by: INTERNAL MEDICINE

## 2020-10-12 PROCEDURE — 4040F PNEUMOC VAC/ADMIN/RCVD: CPT | Performed by: INTERNAL MEDICINE

## 2020-10-12 PROCEDURE — 93000 ELECTROCARDIOGRAM COMPLETE: CPT | Performed by: INTERNAL MEDICINE

## 2020-10-12 PROCEDURE — G8417 CALC BMI ABV UP PARAM F/U: HCPCS | Performed by: INTERNAL MEDICINE

## 2020-10-12 RX ORDER — AMLODIPINE BESYLATE 5 MG/1
5 TABLET ORAL DAILY
COMMUNITY
End: 2022-05-13 | Stop reason: ALTCHOICE

## 2020-10-12 NOTE — PATIENT INSTRUCTIONS
Plan:  1. Monitor BP at home. Goal /80. Hold Amlodipine (Norvasc) if Blood Pressure is less than 105 for the top number. 2.  Can use Eucerin to the skin. It is over the counter. 3.  Labs in October with labs from Dr. Carlyn Mcmahon. 4.  See Dr. Danielito Peetrson in 6 months.

## 2020-10-13 NOTE — PROGRESS NOTES
(HYDRODIURIL) 25 MG tablet Take 25 mg by mouth See Admin Instructions Take 5 days weekly Yes Historical Provider, MD   amLODIPine (NORVASC) 5 MG tablet Take 5 mg by mouth daily Yes Historical Provider, MD   omeprazole (PRILOSEC) 40 MG delayed release capsule Take 1 capsule by mouth daily Yes Scotty Stewart DO   clopidogrel (PLAVIX) 75 MG tablet Take 1 tablet by mouth daily Yes Paddy Oropeza APRN - CNP   mometasone (NASONEX) 50 MCG/ACT nasal spray 2 sprays by Each Nostril route daily  Patient taking differently: 2 sprays by Each Nostril route daily as needed  Yes Scotty Stewart DO   pravastatin (PRAVACHOL) 80 MG tablet Take 1 tablet by mouth every evening Yes Scotty Stewart DO   ezetimibe (ZETIA) 10 MG tablet Take 1 tablet by mouth daily Yes Scotty Stewart DO   losartan (COZAAR) 100 MG tablet Take 1 tablet by mouth daily Yes Scotty Stewart DO   b complex vitamins capsule Take 1 capsule by mouth daily Yes Historical Provider, MD   aspirin 81 MG tablet Take 81 mg by mouth daily Yes Historical Provider, MD   Cholecalciferol (VITAMIN D) 2000 UNITS CAPS capsule Take 1 capsule by mouth daily  Yes Historical Provider, MD   Glucosamine HCl-MSM (GLUCOSAMINE-MSM PO) Take 1 tablet by mouth 2 times daily  Yes Historical Provider, MD   zoster recombinant adjuvanted vaccine (SHINGRIX) 50 MCG/0.5ML SUSR injection Inject 0.5 mLs into the muscle See Admin Instructions 1 dose now and repeat in 2-6 months  Patient not taking: Reported on 11/12/2020  Scotty Stewart DO   solifenacin (VESICARE) 10 MG tablet Take 10 mg by mouth daily   Historical Provider, MD      Past Medical History:  Past Medical History:   Diagnosis Date    BPH     Colon polyps     DDD (degenerative disc disease), lumbar     Diverticulosis     Gallstone pancreatitis 09/05/2017    GERD (gastroesophageal reflux disease)     Hyperlipidemia     Hypertension     Impaired fasting blood sugar     Kidney stones 11/10    Lumbar spondylosis     Mild CAD 10/14/2019    Dr. Carrillo Stewart, [place] Mild CAD 11/13/2019    OAB (overactive bladder)     AUDIE (obstructive sleep apnea) 6/4/2013    Osteoarthritis     Osteoarthritis of right knee     Pneumonia     pneumonia    RBBB 12/19/2016    Renal cyst     Retinal vasculitis     Retinal vasculitis     Right hemiparesis ([facility] [address] 7/25/2019    Right ureteral stone     Type 2 diabetes mellitus without complication, without long-term current use of insulin (HCC)     diet controlled    Type 2 diabetes mellitus without complication, without long-term current use of insulin ([address]      Past Surgical History:    has a past surgical history that includes Appendectomy; Tonsillectomy; Colonoscopy (7/2008); Cardiac catheterization (3/23/2012); right colectomy (3/28/2012); Bone Resection (Left); Cystoscopy (12/2/2014); hernia repair; Cholecystectomy, laparoscopic (09/07/2017); Cataract removal with implant (Left, 11/01/2017); Cataract removal with implant (Right, 10/2017); Cystoscopy (N/A, 12/13/2018); Cystoscopy (Left, 1/2/2019); Insertable Cardiac Monitor; Colonoscopy (N/A, 1/15/2020); and Ventricular ablation surgery (09/22/2020). Social History:  Reviewed. reports that he quit smoking about 43 years ago. His smoking use included cigarettes. He has a 16.00 pack-year smoking history. He has never used smokeless tobacco. He reports current alcohol use of about 4.0 standard drinks of alcohol per week. He reports that he does not use drugs. Family History:  Reviewed. family history includes Cancer in his father; Diabetes in his brother; Mental Illness in his mother; Other in his sister; Sleep Apnea in his brother.      Review of System:  · Constitutional: Negative for fever, night sweats, chills, weight changes, or weakness  · Skin: Negative for rash, dry skin, pruritus, bruising, bleeding, blood clots, or changes in skin pigment  · HEENT: Negative for vision changes, ringing in the ears, sore throat, dysphagia, or swollen lymph nodes  · Respiratory: Negative for SOB/FALCON  · Cardiovascular: Reviewed in HPI  · Gastrointestinal: Negative for abdominal pain, N/V/D, constipation, or black/tarry stools  · Genito-Urinary: Negative for dysuria, incontinence, urgency, or hematuria  · Musculoskeletal: Negative for joint swelling, muscle pain, or injuries  · Neurological/Psych: Negative for confusion, seizures, dizziness, headaches, balance issues or TIA-like symptoms. No anxiety, depression, or insomnia    Physical Examination:  Vitals:    11/12/20 1110   BP: 120/70   Pulse: 55   SpO2: 96%      Wt Readings from Last 3 Encounters:   11/12/20 231 lb 9.6 oz (105.1 kg)   10/27/20 229 lb (103.9 kg)   10/15/20 227 lb (103 kg)     Constitutional: Cooperative and in no apparent distress, and appears well nourished  Skin: Warm and pink; no pallor, cyanosis, bruising, or clubbing  HEENT: Symmetric and normocephalic. PERRL, EOM intact. Conjunctiva pink with clear sclera. Mucus membranes pink and moist. Teeth intact. Thyroid smooth without nodules or goiter  Respiratory: Respirations symmetric and unlabored. Lungs clear to auscultation bilaterally, no wheezing, rhonchi, or crackles  Cardiovascular:  Regular rate and rhythm. S1/S2 present without murmurs, rubs, or gallops. Peripheral pulses 2+, capillary refill < 3 seconds. No elevation of JVP. No peripheral edema  Gastrointestinal: Abdomen soft and round. Bowel sounds normoactive in all quadrants without tenderness or masses. + Obese  Musculoskeletal: Bilateral upper and lower extremity strength 5/5 with full ROM. Neurological/Psych: Awake and orientated to person, place and time. Calm affect, appropriate mood.      Pertinent labs, diagnostic, device, and imaging results reviewed as a part of this visit    LABS    CBC:   Lab Results   Component Value Date    WBC 6.7 11/09/2020    HGB 14.3 11/09/2020    HCT 42.1 11/09/2020    MCV 92.9 11/09/2020     11/09/2020     BMP:   Lab Results   Component Value Date CREATININE 1.0 2020    BUN 12 2020     (L) 2020    K 3.9 2020    CL 97 (L) 2020    CO2 24 2020     Estimated Creatinine Clearance: 73 mL/min (based on SCr of 1 mg/dL). Thyroid:   Lab Results   Component Value Date    TSH 0.97 2020     Lipid Panel:   Lab Results   Component Value Date    CHOL 122 2020    HDL 41 2020    HDL 50 2011    TRIG 152 2020     LFTs:  Lab Results   Component Value Date    ALT 17 2020    AST 17 2020    ALKPHOS 78 2020    BILITOT 0.7 2020     Coags:   Lab Results   Component Value Date    PROTIME 10.7 2020    INR 0.92 2020    APTT 28.5 2019       EC2020  - NSR with RBBB/LAFB. Occasional PVC    Echo:    A bubble study was performed and fails to show evidence of shunting. Left ventricular systolic function is normal with ejection fraction   estimated at 55-60 %. No regional wall motion abnormalities are noted. There is mild left ventricular hypertrophy. Left ventricle size is normal.   Mild mitral and tricuspid regurgitation. Grade I diastolic dysfunction with normal LV filling pressures. Echo:    -Arrhythmia noted. -Normal left ventricle size and systolic function with an estimated ejection   fraction of 55%. There is concentric left ventricular hypertrophy. Grade I   diastolic dysfunction with elevated LV filling pressures. -Mitral annular calcification is present. -Mild mitral regurgitation.   -Aortic valve appears sclerotic but opens adequately. -Mild tricuspid regurgitation with PASP of 34 mmHg.     GXT: 10/19  No EKG evidence for ischemia with lexiscan       Normal LV size and systolic function.       Myocardial perfusion imaging with small area of decreased uptake in the    inferoapical wall with stress and rest consistent with scar in the territory    typical of the RCA, Cx or LAD arteries.       No significant reversible ischemia Cath: 10/14/19   LM       Normal              LAD     20% mid              Cx        Normal              RCA     Normal, nondominant              LVG     Not performed              EDP     12 mmHg     MCOT:  8/22-9/20/19  Sinus rhythm with frequent PVC's  PVC burden 25%  PAC burden 3%  High   Low HR 42  Average HR 69    Assessment:    1. Symptomatic PVC's  - Holter monitor reviewed: Patient had high PVC burden 25% (8/19)  - S/p PVC ablation (9/23/20)    - Discussed PVCs, risks, therapies and alternatives with patient. All questions were answered  - Treatment options including medical therapy with antiarrhythmic drugs or ablation discussed   ~ Limited medical therapies due to low-normal resting heart rate   ~ His symptoms of fatigue/SOB may be correlated to his PVC    - Consider holter at next visit to assess PVC burden    2. Implantable Loop Recorder  - S/p ILR insertion on 10/19  -The CIED was interrogated and programmed and I supervised and reviewed all the data. All findings and changes are in device interrogation sheat and reflect my personal interpretation and changes and is scanned to Epic  - Device shows: NSR  - Follow up with device clinic as scheduled    3. Bradycardia   - Noted on loop during morning/sleeping hours   - Asymptomatic   - Avoid CCB/BB   - Discussed s/s of symptomatic bradycardia and possible need for PPM long term   - Continue to monitor    4. HTN  - Controlled: Goal <130/80   ~ Improved off amlodipine    - Encouraged to monitor and log BP readings at home, then bring log to next visit  - Discussed importance of low sodium diet, weight control and exercise    5. CAD  - Mild LAD lesion per [facility] (10/19)  - Stable  - No complaints of angina  - Continue ASA, ARB, and statin    6.  Chronic diastolic heart failure (NYHA Class II)  - Appears compensated   ~ EF 55% per echo; Grade I DD (7/19)  - Continue with HCTZ 25 mg QD, losartan 100 mg QD  ~ No BB due to borderline HR  - Aggressive medical therapy with risk factor modification  - Discussed with patient importance of monitoring weight, low sodium diet and fluid restriction    7. Hx of Stroke   - On ASA/Plavix/statin   - Followed by neurology    8. AUDIE  - Stable: Uses CPAP  - Encourage to use CPAP to prevent long term effects of untreated AUDIE    9. Obesity  - Body mass index is 35.21 kg/m². - Complicating assessment and treatment. Placing patient at risk for multiple co-morbidities as well as early death and contributing to the patient's presentation  - Discussed weight loss and patient was encouraged to reduce calorie intake and increase exercise    Plan:  1. Continue current medications  2. Exercise as tolerated  3. Call office if any worsening leg swelling or shortness of breath    F/U: Follow-up with CHF team as requested and Dr. Eloina Martinez in 6 months  -Follow up with device clinic as scheduled  -[facility] at [phone] with any questions    Diet & Exercise:   The patient is counseled to follow a low salt diet to assure blood pressure remains controlled for cardiovascular risk factor modification   The patient is counseled to avoid excess caffeine, and energy drinks as this may exacerbated ectopy and arrhythmia   The patient is counseled to lose weight to control cardiovascular risk factors   Exercise program discussed: To improve overall cardiovascular health, the patient is instructed to increase cardiovascular related activities with a goal of 150 min/week of moderate level activity or 10,000 steps per day. Encouraged to perform as much activity as tolerated    Quality Metrics  1. Tobacco Cessation Counseling: N/A  2. Retake of BP if >140/90: N/A  3. Documentation to PCP: Note sent to PCP office visit  4. CAD patient on anti-platelet: Yes  5. CAD patient on STATIN therapy: Yes   6.    Patient with history of CHF and atrial fibrillation on anticoagulation: N/A      I have addressed the patient's cardiac risk factors and adjusted pharmacologic treatment as needed. In addition, I have reinforced the need for patient directed risk factor modification. I independently reviewed the device check interrogation and ECG    All questions and concerns were addressed with the patient. Alternatives to treatment were discussed. Thank you for allowing to us to participate in the care of Tulio Wei.     FAHAD Barr-CLAUDIO  [address]   Office: [phone]

## 2020-10-15 ENCOUNTER — OFFICE VISIT (OUTPATIENT)
Dept: PULMONOLOGY | Age: 75
End: 2020-10-15
Payer: MEDICARE

## 2020-10-15 VITALS
RESPIRATION RATE: 18 BRPM | SYSTOLIC BLOOD PRESSURE: 123 MMHG | HEART RATE: 81 BPM | BODY MASS INDEX: 34.4 KG/M2 | HEIGHT: 68 IN | WEIGHT: 227 LBS | OXYGEN SATURATION: 96 % | DIASTOLIC BLOOD PRESSURE: 69 MMHG

## 2020-10-15 PROCEDURE — 99213 OFFICE O/P EST LOW 20 MIN: CPT | Performed by: INTERNAL MEDICINE

## 2020-10-15 PROCEDURE — G8427 DOCREV CUR MEDS BY ELIG CLIN: HCPCS | Performed by: INTERNAL MEDICINE

## 2020-10-15 PROCEDURE — 1123F ACP DISCUSS/DSCN MKR DOCD: CPT | Performed by: INTERNAL MEDICINE

## 2020-10-15 PROCEDURE — 1036F TOBACCO NON-USER: CPT | Performed by: INTERNAL MEDICINE

## 2020-10-15 PROCEDURE — G8417 CALC BMI ABV UP PARAM F/U: HCPCS | Performed by: INTERNAL MEDICINE

## 2020-10-15 PROCEDURE — G8484 FLU IMMUNIZE NO ADMIN: HCPCS | Performed by: INTERNAL MEDICINE

## 2020-10-15 PROCEDURE — 4040F PNEUMOC VAC/ADMIN/RCVD: CPT | Performed by: INTERNAL MEDICINE

## 2020-10-15 ASSESSMENT — ENCOUNTER SYMPTOMS
SHORTNESS OF BREATH: 1
BLOOD IN STOOL: 0
APNEA: 0
ABDOMINAL DISTENTION: 0
VOICE CHANGE: 0
CHEST TIGHTNESS: 0
ABDOMINAL PAIN: 0
CONSTIPATION: 0
DIARRHEA: 0
ANAL BLEEDING: 0
WHEEZING: 0
STRIDOR: 0
RHINORRHEA: 0
BACK PAIN: 0
CHOKING: 0
SINUS PRESSURE: 0
COUGH: 0
SORE THROAT: 0

## 2020-10-15 NOTE — PROGRESS NOTES
Igor Gr    YOB: 1945     Date of Service:  10/15/2020     Chief Complaint   Patient presents with    Shortness of Breath     follow up - pt had his PFT and his here for the results         HPI patient is accompanied his wife to our office today. Patient is status post RFA for frequent PVCs on 9/22. Patient states that his dyspnea has somewhat improved since then and he has slowly improving exercise capacity. Decreased tiredness and fatigue. Some leg edema. Patient is here for the results of the PFT study.     No Known Allergies  No outpatient medications have been marked as taking for the 10/15/20 encounter (Office Visit) with Tia Mcgee MD.       Immunization History   Administered Date(s) Administered    Influenza Virus Vaccine 10/06/2014    Influenza Whole 10/14/2011    Influenza, High Dose (Fluzone 65 yrs and older) 09/05/2017, 10/26/2018, 10/14/2019    Pneumococcal Conjugate 13-valent ([id number]) 06/19/2015    Pneumococcal Polysaccharide ([id number]) 12/14/2011    Tetanus 02/14/2005       Past Medical History:   Diagnosis Date    BPH     Colon polyps     DDD (degenerative disc disease), lumbar     Diverticulosis     Gallstone pancreatitis 09/05/2017    GERD (gastroesophageal reflux disease)     Hyperlipidemia     Hypertension     Impaired fasting blood sugar     Kidney stones 11/10    Lumbar spondylosis     Mild CAD 10/14/2019    [facility]. Carol Henning, [facility]    Mild CAD 11/13/2019    OAB (overactive bladder)     AUDIE (obstructive sleep apnea) 6/4/2013    Osteoarthritis     Osteoarthritis of right knee     Pneumonia     pneumonia    RBBB 12/19/2016    Renal cyst     Retinal vasculitis     Retinal vasculitis     Right hemiparesis ([facility] [address] 7/25/2019    Right ureteral stone     Type 2 diabetes mellitus without complication, without long-term current use of insulin (HCC)     diet controlled    Type 2 diabetes mellitus without complication, without long-term current use of insulin ([facility] [address]      Past Surgical History:   Procedure Laterality Date    APPENDECTOMY      BONE RESECTION Left     bone spur removal left elbow    CARDIAC CATHETERIZATION  3/23/2012    CATARACT REMOVAL WITH IMPLANT Left 11/01/2017    CATARACT REMOVAL WITH IMPLANT Right 10/2017    CHOLECYSTECTOMY, LAPAROSCOPIC  09/07/2017    COLONOSCOPY  7/2008    multiple    COLONOSCOPY N/A 1/15/2020    COLONOSCOPY POLYPECTOMY SNARE/COLD BIOPSY performed by Evaristo Stanton MD at [address]  12/2/2014    Retrograde Pyelogram, stent, Dr. Marley Becerril N/A 12/13/2018    CYSTOSCOPY, LEFT RETROGRADE PYELOGRAM, LEFT STENT PLACEMENT performed by Stan Varela MD at [address] [place] [address] Left 1/2/2019    CYSTOSCOPY LEFT URETEROSCOPY HOLMIUM LASER LITHOTRIPSY, STONE MANIPULATION WITH LEFT STENT EXCHANGE performed by Stan Varela MD at [facility]      RIGHT COLECTOMY  3/28/2012    laparoscopic    TONSILLECTOMY       Family History   Problem Relation Age of Onset    Mental Illness Mother         Alzheimers    Cancer Father         Leukemia    Diabetes Brother     Sleep Apnea Brother     Other Sister         pyloric valve repacement       Review of Systems:  Review of Systems   Constitutional: Positive for fatigue. Negative for activity change, appetite change and fever. HENT: Negative for congestion, ear discharge, ear pain, postnasal drip, rhinorrhea, sinus pressure, sneezing, sore throat, tinnitus and voice change. Respiratory: Positive for shortness of breath. Negative for apnea, cough, choking, chest tightness, wheezing and stridor. Cardiovascular: Negative for chest pain, palpitations and leg swelling. Gastrointestinal: Negative for abdominal distention, abdominal pain, anal bleeding, blood in stool, constipation and diarrhea. Musculoskeletal: Negative for arthralgias, back pain and gait problem.    Skin: Negative for pallor and rash. Allergic/Immunologic: Negative for environmental allergies. Neurological: Negative for dizziness, tremors, seizures, syncope, speech difficulty, weakness, light-headedness, numbness and headaches. Hematological: Negative for adenopathy. Does not bruise/bleed easily. Psychiatric/Behavioral: Negative for sleep disturbance. Vitals:    10/15/20 1347   BP: 123/69   Pulse: 81   Resp: 18   SpO2: 96%   Weight: 227 lb (103 kg)   Height: 5' 8\" (1.727 m)     No flowsheet data found. Body mass index is 34.52 kg/m². Wt Readings from Last 3 Encounters:   10/15/20 227 lb (103 kg)   10/12/20 231 lb (104.8 kg)   09/23/20 234 lb 2.1 oz (106.2 kg)     BP Readings from Last 3 Encounters:   10/15/20 123/69   10/12/20 120/80   09/23/20 129/75         Physical Exam  Constitutional:       General: He is not in acute distress. Appearance: He is well-developed. He is not diaphoretic. HENT:      Mouth/Throat:      Pharynx: No oropharyngeal exudate. Cardiovascular:      Rate and Rhythm: Normal rate and regular rhythm. Heart sounds: Normal heart sounds. No murmur. Pulmonary:      Effort: No respiratory distress. Breath sounds: Normal breath sounds. No wheezing or rales. Chest:      Chest wall: No tenderness. Abdominal:      General: There is no distension. Palpations: There is no mass. Tenderness: There is no abdominal tenderness. There is no guarding or rebound. Musculoskeletal:         General: No swelling, tenderness or deformity. Skin:     Coloration: Skin is not pale. Findings: No erythema or rash. Neurological:      Mental Status: He is alert and oriented to person, place, and time. Cranial Nerves: No cranial nerve deficit. Motor: No abnormal muscle tone.       Coordination: Coordination normal.      Deep Tendon Reflexes: Reflexes normal.             Health Maintenance   Topic Date Due    DTaP/Tdap/Td vaccine (1 - Tdap) 08/30/1962    Annual Wellness Visit (AWV)  05/29/2019    Flu vaccine (1) 09/01/2020    Shingles Vaccine (1 of 2) 07/21/2021 (Originally 8/30/1993)    A1C test (Diabetic or Prediabetic)  02/06/2021    Lipid screen  07/03/2021    Diabetic foot exam  07/21/2021    Diabetic retinal exam  08/12/2021    Potassium monitoring  09/22/2021    Creatinine monitoring  09/22/2021    Pneumococcal 65+ years Vaccine  Completed    Hepatitis A vaccine  Aged Out    Hib vaccine  Aged Out    Meningococcal (ACWY) vaccine  Aged Out          Assessment/Plan:    Reviewed patient's PFT which was performed on 9/21, shows no evidence of obstructive airway disease-FEV1 of 2.87 L [104% predicted] and DLCO of 92% predicted. This was reassured to the patient. Patient will not benefit from inhaler therapy. Patient's respiratory symptoms are in fact related to frequent PVCs/diastolic CHF and severe sleep apnea. Patient will continue follow-up with cardiology-Dr. Gerald Chaudhari and Dr. Jameson Craig. Influenza vaccination was offered, patient states that he would obtain it over the next couple of weeks    Follow up patient as needed    No follow-ups on file.

## 2020-10-19 ENCOUNTER — NURSE ONLY (OUTPATIENT)
Dept: CARDIOLOGY CLINIC | Age: 75
End: 2020-10-19
Payer: MEDICARE

## 2020-10-19 PROCEDURE — G2066 INTER DEVC REMOTE 30D: HCPCS | Performed by: INTERNAL MEDICINE

## 2020-10-19 PROCEDURE — 93298 REM INTERROG DEV EVAL SCRMS: CPT | Performed by: INTERNAL MEDICINE

## 2020-10-19 NOTE — LETTER
[address] [phone]  [address] [phone]    Pacemaker/Defibrillator Clinic          10/19/20        [address] [place] [address]        Dear Augie Gaming    This letter is to inform you that we received the transmission from your monitor at home that checks your implanted heart device. The next date your monitor will automatically transmit will be 1-4-21. If your report needs attention we will notify you. Your device and monitor are wireless and most transmit cellularly, but please periodically check your monitor is still plugged in to the electrical outlet. If you still use the telephone land line to send please ensure the connection to the phone misty is secure. This will help to ensure successful automatic transmissions in the future. Also, the monitor needs to be close to you while sleeping at night. Please be aware that the remote device transmission sites are periodically monitored only during regular business hours during which simultaneous in-office device clinics are being run. If your transmission requires attention, we will contact you as soon as possible. Thank you.             [address]

## 2020-10-19 NOTE — PROGRESS NOTES
We received a remote transmission form patient's monitor at home. Remote Linq report shows gallito recordings and NSR with ectopy. EP physician to review. We will continue to monitor remotely.

## 2020-10-27 ENCOUNTER — OFFICE VISIT (OUTPATIENT)
Dept: INTERNAL MEDICINE CLINIC | Age: 75
End: 2020-10-27
Payer: MEDICARE

## 2020-10-27 ENCOUNTER — TELEPHONE (OUTPATIENT)
Dept: INTERNAL MEDICINE CLINIC | Age: 75
End: 2020-10-27

## 2020-10-27 VITALS
BODY MASS INDEX: 34.71 KG/M2 | RESPIRATION RATE: 12 BRPM | SYSTOLIC BLOOD PRESSURE: 120 MMHG | TEMPERATURE: 98.2 F | DIASTOLIC BLOOD PRESSURE: 68 MMHG | HEART RATE: 66 BPM | WEIGHT: 229 LBS | HEIGHT: 68 IN

## 2020-10-27 PROCEDURE — G0446 INTENS BEHAVE THER CARDIO DX: HCPCS | Performed by: INTERNAL MEDICINE

## 2020-10-27 PROCEDURE — 1123F ACP DISCUSS/DSCN MKR DOCD: CPT | Performed by: INTERNAL MEDICINE

## 2020-10-27 PROCEDURE — G8482 FLU IMMUNIZE ORDER/ADMIN: HCPCS | Performed by: INTERNAL MEDICINE

## 2020-10-27 PROCEDURE — G0438 PPPS, INITIAL VISIT: HCPCS | Performed by: INTERNAL MEDICINE

## 2020-10-27 PROCEDURE — 4040F PNEUMOC VAC/ADMIN/RCVD: CPT | Performed by: INTERNAL MEDICINE

## 2020-10-27 RX ORDER — HYDROCHLOROTHIAZIDE 25 MG/1
25 TABLET ORAL SEE ADMIN INSTRUCTIONS
COMMUNITY
End: 2021-05-24 | Stop reason: SDUPTHER

## 2020-10-27 RX ORDER — ZOSTER VACCINE RECOMBINANT, ADJUVANTED 50 MCG/0.5
0.5 KIT INTRAMUSCULAR SEE ADMIN INSTRUCTIONS
Qty: 0.5 ML | Refills: 1 | Status: SHIPPED | OUTPATIENT
Start: 2020-10-27 | End: 2020-11-12

## 2020-10-27 RX ORDER — BLOOD SUGAR DIAGNOSTIC
1 STRIP MISCELLANEOUS DAILY
Qty: 300 EACH | Refills: 4 | Status: SHIPPED | OUTPATIENT
Start: 2020-10-27 | End: 2020-10-30 | Stop reason: SDUPTHER

## 2020-10-27 RX ORDER — LANCETS 30 GAUGE
1 EACH MISCELLANEOUS DAILY
Qty: 300 EACH | Refills: 4 | Status: SHIPPED | OUTPATIENT
Start: 2020-10-27 | End: 2020-10-30 | Stop reason: SDUPTHER

## 2020-10-27 RX ORDER — TAMSULOSIN HYDROCHLORIDE 0.4 MG/1
0.4 CAPSULE ORAL DAILY
Qty: 90 CAPSULE | Refills: 3 | Status: SHIPPED | OUTPATIENT
Start: 2020-10-27 | End: 2020-10-30 | Stop reason: SDUPTHER

## 2020-10-27 RX ORDER — TAMSULOSIN HYDROCHLORIDE 0.4 MG/1
0.4 CAPSULE ORAL DAILY
Qty: 90 CAPSULE | Refills: 3 | Status: SHIPPED
Start: 2020-10-27 | End: 2020-10-27 | Stop reason: SDUPTHER

## 2020-10-27 ASSESSMENT — PATIENT HEALTH QUESTIONNAIRE - PHQ9
1. LITTLE INTEREST OR PLEASURE IN DOING THINGS: 0
3. TROUBLE FALLING OR STAYING ASLEEP: 0
SUM OF ALL RESPONSES TO PHQ QUESTIONS 1-9: 0
5. POOR APPETITE OR OVEREATING: 0
SUM OF ALL RESPONSES TO PHQ QUESTIONS 1-9: 0
10. IF YOU CHECKED OFF ANY PROBLEMS, HOW DIFFICULT HAVE THESE PROBLEMS MADE IT FOR YOU TO DO YOUR WORK, TAKE CARE OF THINGS AT HOME, OR GET ALONG WITH OTHER PEOPLE: 0
4. FEELING TIRED OR HAVING LITTLE ENERGY: 0
SUM OF ALL RESPONSES TO PHQ QUESTIONS 1-9: 0
SUM OF ALL RESPONSES TO PHQ QUESTIONS 1-9: 0
SUM OF ALL RESPONSES TO PHQ9 QUESTIONS 1 & 2: 0
SUM OF ALL RESPONSES TO PHQ QUESTIONS 1-9: 0
2. FEELING DOWN, DEPRESSED OR HOPELESS: 0
9. THOUGHTS THAT YOU WOULD BE BETTER OFF DEAD, OR OF HURTING YOURSELF: 0
6. FEELING BAD ABOUT YOURSELF - OR THAT YOU ARE A FAILURE OR HAVE LET YOURSELF OR YOUR FAMILY DOWN: 0
7. TROUBLE CONCENTRATING ON THINGS, SUCH AS READING THE NEWSPAPER OR WATCHING TELEVISION: 0
8. MOVING OR SPEAKING SO SLOWLY THAT OTHER PEOPLE COULD HAVE NOTICED. OR THE OPPOSITE, BEING SO FIGETY OR RESTLESS THAT YOU HAVE BEEN MOVING AROUND A LOT MORE THAN USUAL: 0
SUM OF ALL RESPONSES TO PHQ QUESTIONS 1-9: 0

## 2020-10-27 ASSESSMENT — LIFESTYLE VARIABLES
HOW OFTEN DURING THE LAST YEAR HAVE YOU FOUND THAT YOU WERE NOT ABLE TO STOP DRINKING ONCE YOU HAD STARTED: 0
HOW MANY STANDARD DRINKS CONTAINING ALCOHOL DO YOU HAVE ON A TYPICAL DAY: 0
HOW OFTEN DURING THE LAST YEAR HAVE YOU FAILED TO DO WHAT WAS NORMALLY EXPECTED FROM YOU BECAUSE OF DRINKING: 0
HOW OFTEN DURING THE LAST YEAR HAVE YOU HAD A FEELING OF GUILT OR REMORSE AFTER DRINKING: 0
AUDIT TOTAL SCORE: 2
HOW OFTEN DO YOU HAVE A DRINK CONTAINING ALCOHOL: 2
AUDIT-C TOTAL SCORE: 2
HOW OFTEN DURING THE LAST YEAR HAVE YOU NEEDED AN ALCOHOLIC DRINK FIRST THING IN THE MORNING TO GET YOURSELF GOING AFTER A NIGHT OF HEAVY DRINKING: 0
HAS A RELATIVE, FRIEND, DOCTOR, OR ANOTHER HEALTH PROFESSIONAL EXPRESSED CONCERN ABOUT YOUR DRINKING OR SUGGESTED YOU CUT DOWN: 0
HOW OFTEN DURING THE LAST YEAR HAVE YOU BEEN UNABLE TO REMEMBER WHAT HAPPENED THE NIGHT BEFORE BECAUSE YOU HAD BEEN DRINKING: 0
HOW OFTEN DO YOU HAVE SIX OR MORE DRINKS ON ONE OCCASION: 0
HAVE YOU OR SOMEONE ELSE BEEN INJURED AS A RESULT OF YOUR DRINKING: 0

## 2020-10-27 NOTE — PROGRESS NOTES
[facility]) Physicians  Internal Medicine  Patient Encounter  Modesto Franklin D.O., [facility]       Medicare Annual Wellness Visit  Name: Hugh Terrazas Date: 10/27/2020   MRN: [id number] Sex: Male   Age: 66 y.o. Ethnicity: Non-/Non    : 1945 Race: Dedra More is here for Medicare AWV    Screenings for behavioral, psychosocial and functional/safety risks, and cognitive dysfunction are all negative except as indicated below. These results, as well as other patient data from the [address] form, are documented in Flowsheets linked to this Encounter.     Medical/Surgical Histories     Past Medical History:   Diagnosis Date    BPH     Colon polyps     DDD (degenerative disc disease), lumbar     Diverticulosis     Gallstone pancreatitis 2017    GERD (gastroesophageal reflux disease)     Hyperlipidemia     Hypertension     Impaired fasting blood sugar     Kidney stones 11/10    Lumbar spondylosis     Mild CAD 10/14/2019    Dr. Janell Gonzalez, [facility]    Mild CAD 2019    OAB (overactive bladder)     AUDIE (obstructive sleep apnea) 2013    Osteoarthritis     Osteoarthritis of right knee     Pneumonia     pneumonia    RBBB 2016    Renal cyst     Retinal vasculitis     Retinal vasculitis     Right hemiparesis ([address] 2019    Right ureteral stone     Type 2 diabetes mellitus without complication, without long-term current use of insulin ([facility])     diet controlled    Type 2 diabetes mellitus without complication, without long-term current use of insulin ([address]           Past Surgical History:   Procedure Laterality Date    APPENDECTOMY      BONE RESECTION Left     bone spur removal left elbow    CARDIAC CATHETERIZATION  3/23/2012    CATARACT REMOVAL WITH IMPLANT Left 2017    CATARACT REMOVAL WITH IMPLANT Right 10/2017    CHOLECYSTECTOMY, LAPAROSCOPIC  2017    COLONOSCOPY  2008    multiple    COLONOSCOPY N/A 1/15/2020 COLONOSCOPY POLYPECTOMY SNARE/COLD BIOPSY performed by Jamie Horton MD at [address]  12/2/2014    Retrograde Pyelogram, stent, Dr. Melissa Rosas N/A 12/13/2018    CYSTOSCOPY, LEFT RETROGRADE PYELOGRAM, LEFT STENT PLACEMENT performed by Irena Druon MD at [address] Left 1/2/2019    CYSTOSCOPY LEFT URETEROSCOPY HOLMIUM LASER LITHOTRIPSY, STONE MANIPULATION WITH LEFT STENT EXCHANGE performed by Irena Duron MD at [place] [address]  3/28/2012    laparoscopic    TONSILLECTOMY      VENTRICULAR ABLATION SURGERY  09/22/2020           Medications/Allergies     Medication Sig    amLODIPine (NORVASC) 5 MG tablet Take 5 mg by mouth daily    tamsulosin (FLOMAX) 0.4 MG capsule Take 1 capsule by mouth 2 times daily (Patient taking differently: Take 0.4 mg by mouth 2 times daily Indications: prn )    omeprazole (PRILOSEC) 40 MG delayed release capsule Take 1 capsule by mouth daily    clopidogrel (PLAVIX) 75 MG tablet Take 1 tablet by mouth daily    hydrochlorothiazide (HYDRODIURIL) 25 MG tablet Take 1 tablet by mouth 5 days wekly    pravastatin (PRAVACHOL) 80 MG tablet Take 1 tablet by mouth every evening    ezetimibe (ZETIA) 10 MG tablet Take 1 tablet by mouth daily    losartan (COZAAR) 100 MG tablet Take 1 tablet by mouth daily    b complex vitamins capsule Take 1 capsule by mouth daily    solifenacin (VESICARE) 10 MG tablet Take 10 mg by mouth daily     aspirin 81 MG tablet Take 81 mg by mouth daily    Cholecalciferol (VITAMIN D) 2000 UNITS CAPS capsule Take 1 capsule by mouth daily     Glucosamine HCl-MSM (GLUCOSAMINE-MSM PO) Take 1 tablet by mouth 2 times daily     mometasone (NASONEX) 50 MCG/ACT nasal spray 2 sprays by Each Nostril route daily (Patient taking differently: 2 sprays by Each Nostril route daily as needed )     No current facility-administered medications for this visit.          No Known Allergies      Substance Use History     Social History     Tobacco Use    Smoking status: Former Smoker     Packs/day: 1.00     Years: 16.00     Pack years: 16.00     Types: Cigarettes     Last attempt to quit: 1977     Years since quittin.4    Smokeless tobacco: Never Used    Tobacco comment: smoked for 12 ys / smoked up to 1 ppd   Substance Use Topics    Alcohol use: Yes     Alcohol/week: 4.0 standard drinks     Types: 1 Glasses of wine, 1 Cans of beer, 2 Standard drinks or equivalent per week     Comment: ONCE A WEEK    Drug use: Never          Family History     Family History   Problem Relation Age of Onset    Mental Illness Mother         Alzheimers    Cancer Father         Leukemia    Diabetes Brother     Sleep Apnea Brother     Other Sister         pyloric valve repacement         CareTeam (Including outside providers/suppliers regularly involved in providing care):   Patient Care Team:  [facility],  as PCP - [address] [place] [address] as PCP - [facility]  Tejal Castelan MD as Consulting Physician (Cardiology)  Anna Lima MD as Consulting Physician (Urology)  Vandana Corona MD as Consulting Physician (Ophthalmology)  Griffin Batista MD as Consulting Physician (General Surgery)  Rosalba Waddell MD as Consulting Physician (Urology)  Nicolas Orellana DPM as Consulting Physician (Podiatry)  Roberto Rivera MD as Consulting Physician (Sleep Medicine)  FAHAD Beauchamp - CNP (Nurse Practitioner)  Coco Youngblood MD as Consulting Physician (Electrophysiology)  Tia Mcgee MD as Consulting Physician (Pulmonology)      Based upon direct observation of the patient, evaluation of cognition reveals recent and remote memory intact. ROS:  GEN:  + Fatigue, no change  CV:  S/P RF ablation of ventricular ectopic focus.   2020 Mapping  and ablation:   We mapped the RV for PVC which showed a wide area.   He was seen by cardiology NL.  GI:  Abdomen is soft and nontender. Obese abdomen. EXT: No pitting edema. SKIN: Warm and dry, normal turgor, no rash or lesions of concern. NEURO: No focal or lateralizing deficits. Gait without ataxia. PSYCH:  Mood and affect NL. Insight seems normal.  Seems a bit more forgetful today. However his 3 word recall was normal.    Patient's complete Health Risk Assessment and screening values have been reviewed and are found in Flowsheets. The following problems were reviewed today and where indicated follow up appointments were made and/or referrals ordered. Positive Risk Factor Screenings with Interventions:     Health Habits/Nutrition:  Health Habits/Nutrition  Do you exercise for at least 20 minutes 2-3 times per week?: Yes  Have you lost any weight without trying in the past 3 months?: No  Do you eat fewer than 2 meals per day?: No  Have you seen a dentist within the past year?: Yes  Body mass index: (!) 34.82  Health Habits/Nutrition Interventions:  · Inadequate physical activity:  Counseled on the need for more physical activity.   · Nutritional issues:  educational materials for healthy, well-balanced diet provided    Personalized Preventive Plan   Current Health Maintenance Status  Immunization History   Administered Date(s) Administered    Influenza Virus Vaccine 10/06/2014    Influenza Whole 10/14/2011    Influenza, High Dose (Fluzone 65 yrs and older) 09/05/2017, 10/26/2018, 10/14/2019, 10/19/2020    Pneumococcal Conjugate 13-valent ([id number]) 06/19/2015    Pneumococcal Polysaccharide ([id number]) 12/14/2011    Tetanus 02/14/2005        Health Maintenance   Topic Date Due    DTaP/Tdap/Td vaccine (1 - Tdap) 08/30/1962    Annual Wellness Visit (AWV)  05/29/2019    Shingles Vaccine (1 of 2) 07/21/2021 (Originally 8/30/1993)    A1C test (Diabetic or Prediabetic)  02/06/2021    Lipid screen  07/03/2021    Diabetic foot exam  07/21/2021    Diabetic retinal exam  08/12/2021    Potassium monitoring  09/22/2021    Creatinine monitoring  09/22/2021    Flu vaccine  Completed    Pneumococcal 65+ years Vaccine  Completed    Hepatitis A vaccine  Aged Out    Hib vaccine  Aged Out    Meningococcal (ACWY) vaccine  Aged Out     Recommendations for Sanghvi Due: see orders and patient instructions/AVS.  . Recommended screening schedule for the next 5-10 years is provided to the patient in written form: see Patient Instructions/AVS.    Deja Bowles was seen today for medicare aw. Diagnoses and all orders for this visit:    Routine general medical examination at a health care facility  --All care gaps identified and addressed  --Update vaccinations-Tdap and Shingrix. Prescriptions provided  --Counseled on the need for ongoing cardiovascular disease risk factor management with lifestyle modifications.  -     Tdap (ADACEL) 5-2-15.5 LF-MCG/0.5 injection; Inject 0.5 mLs into the muscle once for 1 dose  -     OR Intens behave ther cardio dx, 15 minutes []  -     CBC Auto Differential; Future  -     Comprehensive Metabolic Panel; Future  -     Lipid Panel; Future  -     Hemoglobin A1C; Future  -     TSH without Reflex; Future  -     Psa screening; Future    Screening for cardiovascular condition  -     OR Intens behave ther cardio dx, 15 minutes []    Need for prophylactic vaccination against diphtheria-tetanus-pertussis (DTP)  -     Tdap (ADACEL) 5-2-15.5 LF-MCG/0.5 injection; Inject 0.5 mLs into the muscle once for 1 dose    Need for shingles vaccine  -     zoster recombinant adjuvanted vaccine [facility]) 50 MCG/0.5ML SUSR injection; Inject 0.5 mLs into the muscle See Admin Instructions 1 dose now and repeat in 2-6 months    Type 2 diabetes mellitus without complication, without long-term current use of insulin (HCC)  -     Comprehensive Metabolic Panel; Future  -     Lipid Panel;  Future  -     Hemoglobin A1C; Future    Benign essential HTN  -     CBC Auto Differential; Future  - Comprehensive Metabolic Panel; Future    Dyslipidemia  -     Comprehensive Metabolic Panel; Future  -     Lipid Panel; Future  -     Hemoglobin A1C; Future  -     TSH without Reflex; Future    Mild CAD  -     Lipid Panel; Future    Class 1 obesity due to excess calories with serious comorbidity and body mass index (BMI) of 34.0 to 34.9 in adult    Benign prostatic hyperplasia, unspecified whether lower urinary tract symptoms present  -     Discontinue: tamsulosin (FLOMAX) 0.4 MG capsule; Take 1 capsule by mouth daily  -     tamsulosin (FLOMAX) 0.4 MG capsule; Take 1 capsule by mouth daily  -     Psa screening; Future    Screening for prostate cancer  -     Psa screening; Future      Bg Mcclelland seems a little off today. He seems a little confused when reviewing his paperwork. He was able to recall 3 words but had some difficulty processing. Consider full neuropsychological evaluation  Call placed to wife          Cardiovascular Disease Risk Counseling: Assessed the patient's risk to develop cardiovascular disease and reviewed main risk factors. Reviewed steps to reduce disease risk including:   · Quitting tobacco use, reducing amount smoked, or not starting the habit  · Making healthy food choices  · Being physically active and gradualy increasing activity levels   · Reduce weight and determine a healthy BMI goal  · Monitor blood pressure and treat if higher than 140/90 mmHg  · Maintain blood total cholesterol levels under 5 mmol/l or 190 mg/dl  · Maintain LDL cholesterol levels under 3.0 mmol/l or 115 mg/dl   · Control blood glucose levels  · Consider taking aspirin (75 mg daily), once blood pressure is controlled   Provided a follow up plan.   Time spent (minutes): 15 minutes

## 2020-10-27 NOTE — PATIENT INSTRUCTIONS
Advance Directives: Care Instructions  Overview  An advance directive is a legal way to state your wishes at the end of your life. It tells your family and your doctor what to do if you can't say what you want. There are two main types of advance directives. You can change them any time your wishes change. Living will. This form tells your family and your doctor your wishes about life support and other treatment. The form is also called a declaration. Medical power of . This form lets you name a person to make treatment decisions for you when you can't speak for yourself. This person is called a health care agent (health care proxy, health care surrogate). The form is also called a durable power of  for health care. If you do not have an advance directive, decisions about your medical care may be made by a family member, or by a doctor or a  who doesn't know you. It may help to think of an advance directive as a gift to the people who care for you. If you have one, they won't have to make tough decisions by themselves. Follow-up care is a key part of your treatment and safety. Be sure to make and go to all appointments, and call your doctor if you are having problems. It's also a good idea to know your test results and keep a list of the medicines you take. What should you include in an advance directive? Many states have a unique advance directive form. (It may ask you to address specific issues.) Or you might use a universal form that's approved by many states. If your form doesn't tell you what to address, it may be hard to know what to include in your advance directive. Use the questions below to help you get started. · Who do you want to make decisions about your medical care if you are not able to? · What life-support measures do you want if you have a serious illness that gets worse over time or can't be cured? · What are you most afraid of that might happen? (Maybe you're afraid of having pain, losing your independence, or being kept alive by machines.)  · Where would you prefer to die? (Your home? A hospital? A nursing home?)  · Do you want to donate your organs when you die? · Do you want certain Pent[employer]al[employer] practices performed before you die? When should you call for help? Be sure to contact your doctor if you have any questions. Where can you learn more? Go to [email or web address] and sign in to your [employer] account. Enter R264 in the Agent Ace box to learn more about \"Advance Directives: Care Instructions. \"     If you do not have an account, please click on the \"Sign Up Now\" link. Current as of: December 9, 2019               Content Version: 12.6  © 4059-5819 [employer]. Care instructions adapted under license by [facility] ([facility]). If you have questions about a medical condition or this instruction, always ask your healthcare professional. [place] [address] any warranty or liability for your use of this information. Personalized Preventive Plan for Cookie Post - 10/27/2020  Medicare offers a range of preventive health benefits. Some of the tests and screenings are paid in full while other may be subject to a deductible, co-insurance, and/or copay. Some of these benefits include a comprehensive review of your medical history including lifestyle, illnesses that may run in your family, and various assessments and screenings as appropriate. After reviewing your medical record and screening and assessments performed today your provider may have ordered immunizations, labs, imaging, and/or referrals for you. A list of these orders (if applicable) as well as your Preventive Care list are included within your After Visit Summary for your review.     Other Preventive Recommendations:    · A preventive eye exam performed by an eye specialist is recommended every 1-2 years to screen for glaucoma; cataracts, macular degeneration, and other eye disorders. · A preventive dental visit is recommended every 6 months. · Try to get at least 150 minutes of exercise per week or 10,000 steps per day on a pedometer . · Order or download the FREE \"Exercise & Physical Activity: Your Everyday Guide\" from [employer]. Call 4-595.528.3679 or search [employer] online. · You need 2004-4310 mg of calcium and 1703-1569 IU of vitamin D per day. It is possible to meet your calcium requirement with diet alone, but a vitamin D supplement is usually necessary to meet this goal.  · When exposed to the sun, use a sunscreen that protects against both UVA and UVB radiation with an SPF of 30 or greater. Reapply every 2 to 3 hours or after sweating, drying off with a towel, or swimming. · Always wear a seat belt when traveling in a car. Always wear a helmet when riding a bicycle or motorcycle.

## 2020-10-30 RX ORDER — BLOOD SUGAR DIAGNOSTIC
1 STRIP MISCELLANEOUS DAILY
Qty: 300 EACH | Refills: 4 | Status: SHIPPED | OUTPATIENT
Start: 2020-10-30

## 2020-10-30 RX ORDER — LANCETS 30 GAUGE
1 EACH MISCELLANEOUS DAILY
Qty: 300 EACH | Refills: 4 | Status: SHIPPED | OUTPATIENT
Start: 2020-10-30

## 2020-10-30 RX ORDER — BLOOD SUGAR DIAGNOSTIC
1 STRIP MISCELLANEOUS DAILY
Qty: 300 EACH | Refills: 4 | Status: SHIPPED | OUTPATIENT
Start: 2020-10-30 | End: 2020-10-30 | Stop reason: SDUPTHER

## 2020-10-30 RX ORDER — TAMSULOSIN HYDROCHLORIDE 0.4 MG/1
0.4 CAPSULE ORAL DAILY
Qty: 90 CAPSULE | Refills: 3 | Status: SHIPPED | OUTPATIENT
Start: 2020-10-30

## 2020-10-30 RX ORDER — TAMSULOSIN HYDROCHLORIDE 0.4 MG/1
0.4 CAPSULE ORAL DAILY
Qty: 90 CAPSULE | Refills: 3 | Status: SHIPPED | OUTPATIENT
Start: 2020-10-30 | End: 2020-10-30 | Stop reason: SDUPTHER

## 2020-10-30 RX ORDER — LANCETS 30 GAUGE
1 EACH MISCELLANEOUS DAILY
Qty: 300 EACH | Refills: 4 | Status: SHIPPED | OUTPATIENT
Start: 2020-10-30 | End: 2020-10-30 | Stop reason: SDUPTHER

## 2020-10-30 NOTE — TELEPHONE ENCOUNTER
Patient states that the medication sent to [facility] [address] [place] [address] - [address] [place] [address] [phone] - F [phone] on 10/27/20 have not yet been received. Please check into this for patient and get back to him.

## 2020-10-30 NOTE — TELEPHONE ENCOUNTER
Patient is Now Requesting a Written Prescription for these prescription and wants it to be available for  this morning, stating Dr. Susan Kapoor and Rao Calle are aware of this, and said they would take care of it. Patient is requesting a call @ phone # provided once written prescriptions are available for .

## 2020-11-09 ENCOUNTER — HOSPITAL ENCOUNTER (OUTPATIENT)
Age: 75
Discharge: HOME | End: 2020-11-09
Payer: MEDICARE

## 2020-11-09 LAB
A/G RATIO: 1.4 (ref 1.1–2.2)
ALBUMIN SERPL-MCNC: 3.8 G/DL (ref 3.4–5)
ALP BLD-CCNC: 78 U/L (ref 40–129)
ALT SERPL-CCNC: 17 U/L (ref 10–40)
ANION GAP SERPL CALCULATED.3IONS-SCNC: 8 MMOL/L (ref 3–16)
AST SERPL-CCNC: 17 U/L (ref 15–37)
BILIRUB SERPL-MCNC: 0.7 MG/DL (ref 0–1)
BUN BLDV-MCNC: 12 MG/DL (ref 7–20)
CALCIUM SERPL-MCNC: 9.2 MG/DL (ref 8.3–10.6)
CHLORIDE BLD-SCNC: 97 MMOL/L (ref 99–110)
CHOLESTEROL, TOTAL: 122 MG/DL (ref 0–199)
CO2: 24 MMOL/L (ref 21–32)
CREAT SERPL-MCNC: 1 MG/DL (ref 0.8–1.3)
GFR AFRICAN AMERICAN: >60
GFR NON-AFRICAN AMERICAN: >60
GLOBULIN: 2.8 G/DL
GLUCOSE BLD-MCNC: 115 MG/DL (ref 70–99)
HCT VFR BLD CALC: 42.1 % (ref 40.5–52.5)
HDLC SERPL-MCNC: 41 MG/DL (ref 40–60)
HEMOGLOBIN: 14.3 G/DL (ref 13.5–17.5)
LDL CHOLESTEROL CALCULATED: 51 MG/DL
MCH RBC QN AUTO: 31.5 PG (ref 26–34)
MCHC RBC AUTO-ENTMCNC: 33.9 G/DL (ref 31–36)
MCV RBC AUTO: 92.9 FL (ref 80–100)
PDW BLD-RTO: 14 % (ref 12.4–15.4)
PLATELET # BLD: 238 K/UL (ref 135–450)
PMV BLD AUTO: 10 FL (ref 5–10.5)
POTASSIUM SERPL-SCNC: 3.9 MMOL/L (ref 3.5–5.1)
PRO-BNP: 132 PG/ML (ref 0–449)
PROSTATE SPECIFIC ANTIGEN: 1.99 NG/ML (ref 0–4)
RBC # BLD: 4.54 M/UL (ref 4.2–5.9)
SODIUM BLD-SCNC: 129 MMOL/L (ref 136–145)
TOTAL PROTEIN: 6.6 G/DL (ref 6.4–8.2)
TRIGL SERPL-MCNC: 152 MG/DL (ref 0–150)
TSH SERPL DL<=0.05 MIU/L-ACNC: 0.97 UIU/ML (ref 0.27–4.2)
VLDLC SERPL CALC-MCNC: 30 MG/DL
WBC # BLD: 6.7 K/UL (ref 4–11)

## 2020-11-09 PROCEDURE — 36415 COLL VENOUS BLD VENIPUNCTURE: CPT

## 2020-11-09 PROCEDURE — 80053 COMPREHEN METABOLIC PANEL: CPT

## 2020-11-09 PROCEDURE — 84153 ASSAY OF PSA TOTAL: CPT

## 2020-11-09 PROCEDURE — 85027 COMPLETE CBC AUTOMATED: CPT

## 2020-11-09 PROCEDURE — 80061 LIPID PANEL: CPT

## 2020-11-09 PROCEDURE — 84443 ASSAY THYROID STIM HORMONE: CPT

## 2020-11-09 PROCEDURE — 83036 HEMOGLOBIN GLYCOSYLATED A1C: CPT

## 2020-11-09 PROCEDURE — 83880 ASSAY OF NATRIURETIC PEPTIDE: CPT

## 2020-11-10 ENCOUNTER — TELEPHONE (OUTPATIENT)
Dept: CARDIOLOGY CLINIC | Age: 75
End: 2020-11-10

## 2020-11-10 LAB
ESTIMATED AVERAGE GLUCOSE: 131.2 MG/DL
HBA1C MFR BLD: 6.2 %

## 2020-11-10 NOTE — TELEPHONE ENCOUNTER
----- Message from Lee Deutsch MD sent at 11/9/2020 10:24 PM EST -----  Please call patient. Labs look good. No changes.   BERNA

## 2020-11-12 ENCOUNTER — NURSE ONLY (OUTPATIENT)
Dept: CARDIOLOGY CLINIC | Age: 75
End: 2020-11-12
Payer: MEDICARE

## 2020-11-12 ENCOUNTER — OFFICE VISIT (OUTPATIENT)
Dept: CARDIOLOGY CLINIC | Age: 75
End: 2020-11-12
Payer: MEDICARE

## 2020-11-12 VITALS
HEART RATE: 55 BPM | OXYGEN SATURATION: 96 % | SYSTOLIC BLOOD PRESSURE: 120 MMHG | DIASTOLIC BLOOD PRESSURE: 70 MMHG | BODY MASS INDEX: 35.1 KG/M2 | WEIGHT: 231.6 LBS | HEIGHT: 68 IN

## 2020-11-12 PROBLEM — E66.9 OBESITY (BMI 30-39.9): Chronic | Status: ACTIVE | Noted: 2020-11-12

## 2020-11-12 PROCEDURE — 93000 ELECTROCARDIOGRAM COMPLETE: CPT | Performed by: NURSE PRACTITIONER

## 2020-11-12 PROCEDURE — G8482 FLU IMMUNIZE ORDER/ADMIN: HCPCS | Performed by: NURSE PRACTITIONER

## 2020-11-12 PROCEDURE — 1036F TOBACCO NON-USER: CPT | Performed by: NURSE PRACTITIONER

## 2020-11-12 PROCEDURE — G8417 CALC BMI ABV UP PARAM F/U: HCPCS | Performed by: NURSE PRACTITIONER

## 2020-11-12 PROCEDURE — 1123F ACP DISCUSS/DSCN MKR DOCD: CPT | Performed by: NURSE PRACTITIONER

## 2020-11-12 PROCEDURE — 93285 PRGRMG DEV EVAL SCRMS IP: CPT | Performed by: INTERNAL MEDICINE

## 2020-11-12 PROCEDURE — 99214 OFFICE O/P EST MOD 30 MIN: CPT | Performed by: NURSE PRACTITIONER

## 2020-11-12 PROCEDURE — G8427 DOCREV CUR MEDS BY ELIG CLIN: HCPCS | Performed by: NURSE PRACTITIONER

## 2020-11-12 PROCEDURE — 4040F PNEUMOC VAC/ADMIN/RCVD: CPT | Performed by: NURSE PRACTITIONER

## 2020-11-12 NOTE — PATIENT INSTRUCTIONS
1. Continue current medications  2. Exercise as tolerated  3.  Call office if any worsening leg swelling or shortness of breath

## 2020-11-12 NOTE — PROGRESS NOTES
Patient comes in for interrogation of their implanted loop recorder. Interrogation shows 2 pause episodes that are undersensing. 40 Naren episodes during sleep time hours. Longest 26 seconds with a Min V rate of 34 bpm. Implanted for Stroke. Patient remains on ASA 81 and Plavix. Today we increased sensitivity to 0.025 mV from 0.035mV and changed AF recordings to all. Please see interrogation for more detail. Patient will see NPSR today and we will continue to follow the Patient remotely.

## 2020-11-15 ENCOUNTER — TELEPHONE (OUTPATIENT)
Dept: CARDIOLOGY CLINIC | Age: 75
End: 2020-11-15

## 2020-11-15 NOTE — TELEPHONE ENCOUNTER
Carelink report shows possible AF recording. Pt not on anti coags. Implanted for stroke. Will have EP review to see if real AF or not.

## 2020-11-16 NOTE — TELEPHONE ENCOUNTER
Called pt. Discussed ILR findings of brief episode of PAF on 11/14. I discussed anticoagulation to decrease the risk of thromboembolic events including stroke. Benefits and alternatives were discussed with patient. Risk of bleeding was discussed. Patient verbalized understanding. Different forms of anticoagulants including coumadin, Pradaxa, Eliquis, and Xarelto were discussed. Patient opted to start with eliquis 5 mg BID. He will start [facility] and stop his plavix, but continue with ASA 81 mg daily for hx of CVA. Will monitor for recurrent AF with his ILR. Limited medical therapy given low resting HR.     Emilee Trent, APRN-CNP

## 2020-11-16 NOTE — TELEPHONE ENCOUNTER
Dr. Valorie Cohn, please review Linq episode. Patient not on [facility] medication, remains on Plavix and ASA 81. Please advise.  Thanks

## 2020-12-04 ENCOUNTER — TELEPHONE (OUTPATIENT)
Dept: INTERNAL MEDICINE CLINIC | Age: 75
End: 2020-12-04

## 2020-12-04 RX ORDER — PRAVASTATIN SODIUM 80 MG/1
80 TABLET ORAL EVERY EVENING
Qty: 90 TABLET | Refills: 3 | Status: CANCELLED | OUTPATIENT
Start: 2020-12-04 | End: 2021-12-04

## 2020-12-04 RX ORDER — PRAVASTATIN SODIUM 80 MG/1
80 TABLET ORAL EVERY EVENING
Qty: 90 TABLET | Refills: 3 | Status: SHIPPED | OUTPATIENT
Start: 2020-12-04 | End: 2022-06-16 | Stop reason: SDUPTHER

## 2020-12-04 RX ORDER — LOSARTAN POTASSIUM 100 MG/1
100 TABLET ORAL DAILY
Qty: 90 TABLET | Refills: 3 | Status: CANCELLED | OUTPATIENT
Start: 2020-12-04 | End: 2021-12-04

## 2020-12-04 RX ORDER — EZETIMIBE 10 MG/1
10 TABLET ORAL DAILY
Qty: 90 TABLET | Refills: 3 | Status: SHIPPED | OUTPATIENT
Start: 2020-12-04 | End: 2022-06-16 | Stop reason: SDUPTHER

## 2020-12-04 RX ORDER — LOSARTAN POTASSIUM 100 MG/1
100 TABLET ORAL DAILY
Qty: 90 TABLET | Refills: 3 | Status: SHIPPED | OUTPATIENT
Start: 2020-12-04 | End: 2022-05-13 | Stop reason: SDUPTHER

## 2020-12-04 RX ORDER — EZETIMIBE 10 MG/1
10 TABLET ORAL DAILY
Qty: 90 TABLET | Refills: 3 | Status: CANCELLED | OUTPATIENT
Start: 2020-12-04

## 2020-12-04 NOTE — TELEPHONE ENCOUNTER
Patient is requesting the following medications to be refilled and sent to [facility], [place] - [address]  IMANI [phone] - F [phone]    losartan (COZAAR) 100 MG tablet   ezetimibe (ZETIA) 10 MG tablet  omeprazole (PRILOSEC) 40 MG delayed release capsule   pravastatin (PRAVACHOL) 80 MG tablet     Patient is requesting a call when medications have been ordered.

## 2021-01-02 PROCEDURE — G2066 INTER DEVC REMOTE 30D: HCPCS | Performed by: INTERNAL MEDICINE

## 2021-01-02 PROCEDURE — 93298 REM INTERROG DEV EVAL SCRMS: CPT | Performed by: INTERNAL MEDICINE

## 2021-01-04 ENCOUNTER — NURSE ONLY (OUTPATIENT)
Dept: CARDIOLOGY CLINIC | Age: 76
End: 2021-01-04
Payer: MEDICARE

## 2021-01-04 DIAGNOSIS — Z45.09 ENCOUNTER FOR ELECTRONIC ANALYSIS OF REVEAL EVENT RECORDER: ICD-10-CM

## 2021-01-04 DIAGNOSIS — G45.9 TIA (TRANSIENT ISCHEMIC ATTACK): ICD-10-CM

## 2021-01-04 DIAGNOSIS — R00.1 BRADYCARDIA: ICD-10-CM

## 2021-01-04 NOTE — LETTER
[address] [facility] [phone] [address] [phone]    Pacemaker/Defibrillator Clinic          01/04/21        [address] [place] [id number]        Dear Kierra Houston    This letter is to inform you that we received the transmission from your monitor at home that checks your implanted heart device. The next date your monitor will automatically transmit will be 2-8-21. If your report needs attention we will notify you. Your device and monitor are wireless and most transmit cellularly, but please periodically check your monitor is still plugged in to the electrical outlet. If you still use the telephone land line to send please ensure the connection to the phone misty is secure. This will help to ensure successful automatic transmissions in the future. Also, the monitor needs to be close to you while sleeping at night. Please be aware that the remote device transmission sites are periodically monitored only during regular business hours during which simultaneous in-office device clinics are being run. If your transmission requires attention, we will contact you as soon as possible. Thank you.             Emre

## 2021-02-06 PROCEDURE — 93298 REM INTERROG DEV EVAL SCRMS: CPT | Performed by: INTERNAL MEDICINE

## 2021-02-06 PROCEDURE — G2066 INTER DEVC REMOTE 30D: HCPCS | Performed by: INTERNAL MEDICINE

## 2021-02-08 ENCOUNTER — NURSE ONLY (OUTPATIENT)
Dept: CARDIOLOGY CLINIC | Age: 76
End: 2021-02-08
Payer: MEDICARE

## 2021-02-08 DIAGNOSIS — R00.1 BRADYCARDIA: ICD-10-CM

## 2021-02-08 DIAGNOSIS — Z45.09 ENCOUNTER FOR ELECTRONIC ANALYSIS OF REVEAL EVENT RECORDER: ICD-10-CM

## 2021-02-08 NOTE — LETTER
[address] [facility] [phone]  [address] [phone]    Pacemaker/Defibrillator Clinic          02/08/21        [address] [place], [place] [id number]        Dear Humera Tucker    This letter is to inform you that we received the transmission from your monitor at home that checks your implanted heart device. The next date your monitor will automatically transmit will be 3-15-21. If your report needs attention we will notify you. Your device and monitor are wireless and most transmit cellularly, but please periodically check your monitor is still plugged in to the electrical outlet. If you still use the telephone land line to send please ensure the connection to the phone misty is secure. This will help to ensure successful automatic transmissions in the future. Also, the monitor needs to be close to you while sleeping at night. Please be aware that the remote device transmission sites are periodically monitored only during regular business hours during which simultaneous in-office device clinics are being run. If your transmission requires attention, we will contact you as soon as possible. Thank you.             [facility]

## 2021-03-15 ENCOUNTER — NURSE ONLY (OUTPATIENT)
Dept: CARDIOLOGY CLINIC | Age: 76
End: 2021-03-15
Payer: MEDICARE

## 2021-03-15 DIAGNOSIS — R00.1 BRADYCARDIA: ICD-10-CM

## 2021-03-15 DIAGNOSIS — Z45.09 ENCOUNTER FOR ELECTRONIC ANALYSIS OF REVEAL EVENT RECORDER: ICD-10-CM

## 2021-03-15 PROCEDURE — 93298 REM INTERROG DEV EVAL SCRMS: CPT | Performed by: INTERNAL MEDICINE

## 2021-03-15 PROCEDURE — G2066 INTER DEVC REMOTE 30D: HCPCS | Performed by: INTERNAL MEDICINE

## 2021-03-15 NOTE — LETTER
[address] [facility] [phone]  [address] [facility] [phone]    Pacemaker/Defibrillator Clinic          03/15/21        [address] [place] [address]        Dear Luly Foster    This letter is to inform you that we received the transmission from your monitor at home that checks your implanted heart device. The next date your monitor will automatically transmit will be 4-19-21. If your report needs attention we will notify you. Your device and monitor are wireless and most transmit cellularly, but please periodically check your monitor is still plugged in to the electrical outlet. If you still use the telephone land line to send please ensure the connection to the phone misty is secure. This will help to ensure successful automatic transmissions in the future. Also, the monitor needs to be close to you while sleeping at night. Please be aware that the remote device transmission sites are periodically monitored only during regular business hours during which simultaneous in-office device clinics are being run. If your transmission requires attention, we will contact you as soon as possible. Thank you.             [address]

## 2021-03-15 NOTE — PROGRESS NOTES
We received a remote transmission from patient's monitor at home. Remote Linq report shows 1 nocturnal gallito recording. EP physician to review. We will continue to monitor remotely.

## 2021-04-12 NOTE — PROGRESS NOTES
[address]  Advanced CHF/Pulmonary Hypertension   Cardiac Evaluation      Linnette Ayala  YOB: 1945    Date of Visit:  4/21/21    Chief Complaint   Patient presents with    6 Month Follow-Up    Congestive Heart Failure    Other     legs weak      History of Present Illness:  Linnette Ayala is a 66 y.o. male who presents as a referral from my partner Dr. Kelsey Gaston for consultation and management of heart failure symptoms. His history includes HTN, HLD, RBBB, DM. He also was diagnosed with severe AUDIE in 2019 and uses a C-pap.  7/25/2019, he was seen in [facility] ER for fatigue and right sided weakness; he was diagnosed with a TIA and discharged on Plavix & asa. MCOT 8/2019 showed NSR with frequent PVC's (25% burden). 10/14/2019, he had ILR insertion for cryptogenic stroke. An LHC (Dr. Grecia Dior) same day showed very mild CAD to the LAD. On 9/22/2020, he ad a PVC ablation per Dr. Kelsey Gaston. Today, he is here for regular follow up. Overall, he feels fairly well. He denies exertional chest pain, FALCON/PND, palpitations, light-headedness, edema. He c/o itchy rash around his ankles and wrists at times; he has been diagnosed with Eczema. He and his wife have both received the Covid vaccines ([address] [place] [address] on 3/22/2021).     No Known Allergies  Current Outpatient Medications   Medication Sig Dispense Refill    pravastatin (PRAVACHOL) 80 MG tablet Take 1 tablet by mouth every evening 90 tablet 3    losartan (COZAAR) 100 MG tablet Take 1 tablet by mouth daily 90 tablet 3    ezetimibe (ZETIA) 10 MG tablet Take 1 tablet by mouth daily 90 tablet 3    apixaban (ELIQUIS) 5 MG TABS tablet Take 1 tablet by mouth 2 times daily 180 tablet 3    tamsulosin (FLOMAX) 0.4 MG capsule Take 1 capsule by mouth daily 90 capsule 3    blood glucose test strips (GLUCOSE METER TEST) strip 1 each by In Vitro route daily 300 each 4    Lancets MISC 1 each by Does not apply route daily 300 each 4    hydroCHLOROthiazide (HYDRODIURIL) 25 MG tablet Take 25 mg by mouth See Admin Instructions Take 5 days weekly      amLODIPine (NORVASC) 5 MG tablet Take 5 mg by mouth daily      omeprazole (PRILOSEC) 40 MG delayed release capsule Take 1 capsule by mouth daily 90 capsule 3    mometasone (NASONEX) 50 MCG/ACT nasal spray 2 sprays by Each Nostril route daily (Patient taking differently: 2 sprays by Each Nostril route daily as needed ) 3 Inhaler 3    b complex vitamins capsule Take 1 capsule by mouth daily      solifenacin (VESICARE) 10 MG tablet Take 10 mg by mouth daily       aspirin 81 MG tablet Take 81 mg by mouth daily      Cholecalciferol (VITAMIN D) 2000 UNITS CAPS capsule Take 1 capsule by mouth daily       Glucosamine HCl-MSM (GLUCOSAMINE-MSM PO) Take 1 tablet by mouth 2 times daily        No current facility-administered medications for this visit.         Past Medical History:   Diagnosis Date    BPH     Colon polyps     DDD (degenerative disc disease), lumbar     Diverticulosis     Gallstone pancreatitis 09/05/2017    GERD (gastroesophageal reflux disease)     Hyperlipidemia     Hypertension     Impaired fasting blood sugar     Kidney stones 11/10    Lumbar spondylosis     Mild CAD 10/14/2019    Dr. Elbert Sahu, [facility]    Mild CAD 11/13/2019    OAB (overactive bladder)     AUDIE (obstructive sleep apnea) 6/4/2013    Osteoarthritis     Osteoarthritis of right knee     Pneumonia     pneumonia    RBBB 12/19/2016    Renal cyst     Retinal vasculitis     Retinal vasculitis     Right hemiparesis ([address] 7/25/2019    Right ureteral stone     Type 2 diabetes mellitus without complication, without long-term current use of insulin ([facility])     diet controlled    Type 2 diabetes mellitus without complication, without long-term current use of insulin ([address]      Past Surgical History:   Procedure Laterality Date    APPENDECTOMY      BONE RESECTION Left     bone spur removal left elbow    CARDIAC CATHETERIZATION  3/23/2012    CATARACT REMOVAL WITH IMPLANT Left 2017    CATARACT REMOVAL WITH IMPLANT Right 10/2017    CHOLECYSTECTOMY, LAPAROSCOPIC  2017    COLONOSCOPY  2008    multiple    COLONOSCOPY N/A 1/15/2020    COLONOSCOPY POLYPECTOMY SNARE/COLD BIOPSY performed by Araceli Tompkins MD at [address]  2014    Retrograde Pyelogram, stent, Dr. Maxine Haskins N/A 2018    CYSTOSCOPY, LEFT RETROGRADE PYELOGRAM, LEFT STENT PLACEMENT performed by Marcell Montes MD at [facility] Left 2019    CYSTOSCOPY LEFT URETEROSCOPY HOLMIUM LASER LITHOTRIPSY, STONE MANIPULATION WITH LEFT STENT EXCHANGE performed by Marcell Montes MD at [place] [address]  3/28/2012    laparoscopic   [address] SURGERY  2020     Family History   Problem Relation Age of Onset    Mental Illness Mother         Alzheimers    Cancer Father         Leukemia    Diabetes Brother     Sleep Apnea Brother     Other Sister         pyloric valve repacement     Social History     Socioeconomic History    Marital status:      Spouse name: Jocelynn Contreras Number of children: 3    Years of education: 12    Highest education level: Not on file   Occupational History    Not on file   Social Needs    Financial resource strain: Not on file    Food insecurity     Worry: Not on file     Inability: Not on file   [employer] needs     Medical: Not on file     Non-medical: Not on file   Tobacco Use    Smoking status: Former Smoker     Packs/day: 1.00     Years: 16.00     Pack years: 16.00     Types: Cigarettes     Quit date: 1977     Years since quittin.9    Smokeless tobacco: Never Used    Tobacco comment: smoked for 12 ys / smoked up to 1 ppd   Substance and Sexual Activity    Alcohol use:  Yes     Alcohol/week: 4.0 standard drinks     Types: 1 Glasses of wine, 1 Cans of beer, 2 Standard drinks or equivalent per week     Comment: ONCE A WEEK    Drug use: Never    Sexual activity: Yes     Partners: Female   Lifestyle    Physical activity     Days per week: Not on file     Minutes per session: Not on file    Stress: Not on file   Relationships    Social connections     Talks on phone: Not on file     Gets together: Not on file     Attends Restoration service: Not on file     Active member of club or organization: Not on file     Attends meetings of clubs or organizations: Not on file     Relationship status: Not on file    Intimate partner violence     Fear of current or ex partner: Not on file     Emotionally abused: Not on file     Physically abused: Not on file     Forced sexual activity: Not on file   Other Topics Concern    Not on file   Social History Narrative    Not on file       Review of Systems:   · Constitutional: there has been no unanticipated weight loss. There's been no change in energy level, sleep pattern, or activity level. · Eyes: No visual changes or diplopia. No scleral icterus. · ENT: No Headaches, hearing loss or vertigo. No mouth sores or sore throat. · Cardiovascular: Reviewed in HPI  · Respiratory: No cough or wheezing, no sputum production. No hematemesis. · Gastrointestinal: No abdominal pain, appetite loss, blood in stools. No change in bowel or bladder habits. · Genitourinary: No dysuria, trouble voiding, or hematuria. · Musculoskeletal:  No gait disturbance, weakness or joint complaints. · Integumentary: No rash or pruritis. · Neurological: No headache, diplopia, change in muscle strength, numbness or tingling. No change in gait, balance, coordination, mood, affect, memory, mentation, behavior. · Psychiatric: No anxiety, no depression. · Endocrine: No malaise, fatigue or temperature intolerance. No excessive thirst, fluid intake, or urination. No tremor.   · Hematologic/Lymphatic: No abnormal bruising or bleeding, blood clots or swollen lymph nodes.  · Allergic/Immunologic: No nasal congestion or hives. Physical Examination:    Vitals:    04/21/21 1115   BP: 120/80   Site: Left Upper Arm   Position: Sitting   Cuff Size: Medium Adult   Pulse: 60   SpO2: 96%   Weight: 229 lb (103.9 kg)   Height: 5' 8\" (1.727 m)     Body mass index is 34.82 kg/m². Wt Readings from Last 3 Encounters:   04/21/21 229 lb (103.9 kg)   11/12/20 231 lb 9.6 oz (105.1 kg)   10/27/20 229 lb (103.9 kg)     BP Readings from Last 3 Encounters:   04/21/21 120/80   11/12/20 120/70   10/27/20 120/68     Constitutional and General Appearance:   WD/WN in NAD, mildly obese  HEENT:  NC/AT  NUBIA  No problems with hearing  Skin:  Warm, dry  Respiratory:  · Normal excursion and expansion without use of accessory muscles  · Resp Auscultation: Normal breath sounds without dullness  Cardiovascular:  · The apical impulses not displaced  · Heart tones are crisp and normal  · Cervical veins are not engorged  · The carotid upstroke is normal in amplitude and contour without delay or bruit  · JVP less than 8 cm H2O  RRR with nl S1 and S2 without m,r,g  · Peripheral pulses are symmetrical and full  · There is no clubbing, cyanosis of the extremities. · No edema  · Femoral Arteries: 2+ and equal  · Pedal Pulses: 2+ and equal   Neck:  · No thyromegaly  Abdomen:  · No masses or tenderness  · Liver/Spleen: No Abnormalities Noted  Neurological/Psychiatric:  · Alert and oriented in all spheres  · Moves all extremities well  · Exhibits normal gait balance and coordination  · No abnormalities of mood, affect, memory, mentation, or behavior are noted        Procedure performed: PVC Ablation per Dr. Maryam Neumann 9-  Indications for procedure:    Jose Blood is 66 y.o. male with frequent symptomatic PVC  · Electrophysiological study with ablation of left  Outflow PVCs  · Attempted induction of arrhythmia after IV drug infusion.    · Three-dimensional electroanatomic mapping of the left ventricle · Intracardiac ultrasound   · Left atrial recording and mapping via coronary sinus     EC20 Sinus Rhythm with frequent PVC's  QTcH 441     MCOT:  -19  Sinus rhythm with frequent PVC's  PVC burden 25%  PAC burden 3%  High   Low HR 42  Average HR 69     Echo: 19  Summary   -Arrhythmia noted.   -Normal left ventricle size and systolic function with an estimated ejection   fraction of 55%. There is concentric left ventricular hypertrophy. Grade I   diastolic dysfunction with elevated LV filling pressures.   -Mitral annular calcification is present.   -Mild mitral regurgitation.   -Aortic valve appears sclerotic but opens adequately.   -Mild tricuspid regurgitation with PASP of 34 mmHg     Nuclear stress:  10/4/19  Summary   No EKG evidence for ischemia with lexiscan   Normal LV size and systolic function.   Myocardial perfusion imaging with small area of decreased uptake in the   inferoapical wall with stress and rest consistent with scar in the territory   typical of the RCA, Cx or LAD arteries.   No significant reversible ischemia     Cath: 10/14/19   LM       Normal              LAD     20% mid              Cx        Normal              RCA     Normal, nondominant              LVG     Not performed  [facility] were reviewed including labs from other hospital systems through [facility]. Cardiac testing was reviewed including echos, nuclear scans, cardiac catheterization, including from other hospital systems through [facility]. Assessment:   1. Chronic diastolic heart failure ([address]    2. PVC (premature ventricular contraction)    3. Paroxysmal atrial fibrillation (HCC)    4. Benign essential HTN    5. SOB (shortness of breath)    6. Dyslipidemia         1. Chronic diastolic heart failure: Stable. Compensated by exam.  -Continue HCTZ, Losartan. 2. Premature ventricular contractions, h/o:  No PVC's per most recent EKG.   -Asymptomatic prior to ablation per pt. 3. Mild CAD:  Stable, no anginal symptoms  -Very mild by cath 10/2019 (20% in LAD)       4. Benign essential HTN: /80 (Site: Left Upper Arm, Position: Sitting, Cuff Size: Medium Adult)   Pulse 60   Ht 5' 8\" (1.727 m)   Wt 229 lb (103.9 kg)   SpO2 96%   BMI 34.82 kg/m²   -Stable. Continue losartan and amlodipine. 5. Hyperlipidemia: Stable on Pravachol 80mg  7/3/2020> , , HDL 38, LDL 39.   -Continue on pravastatin and Zetia. 6. AUDIE: Uses C-pap. He is compliant. 7. TIA/cryptogenic stroke (7/2019 & 10/2019): F/w neurology  -Taking Plavix & asa  -Has ILR (10/2019)        PLAN:  1. No med changes  2. Labs with next blood draw per PCP> CMP, BNP, CBC, lipids  3. RTO in 6 months  4. He is scheduled to see Dr. Jose Casas 5/20/2021    Time Based Itemization  A total of 30 minutes was spent on today's patient encounter. If applicable, non-patient-facing activities:  ( x)Preparing to see the patient and reviewing records  ( ) Individual interpretation of results  ( ) Discussion or coordination of care with other health care professionals  ( x) Ordering of unique tests, medications, or procedures  ( x) Documentation within the EHR        I appreciate the opportunity of cooperating in the care of this patient. Ivan Brock M.D., [address] [place] [address] Avenue attestation: This note was scribed in the presence of Dr. Wero Flores MD, by Sam Dorsey RN. The scribe's documentation has been prepared under my direction and personally reviewed by me in its entirety. I confirm that the note above accurately reflects all work, treatment, procedures, and medical decision making performed by me.

## 2021-04-19 ENCOUNTER — NURSE ONLY (OUTPATIENT)
Dept: CARDIOLOGY CLINIC | Age: 76
End: 2021-04-19
Payer: MEDICARE

## 2021-04-19 DIAGNOSIS — Z45.09 ENCOUNTER FOR ELECTRONIC ANALYSIS OF REVEAL EVENT RECORDER: ICD-10-CM

## 2021-04-19 DIAGNOSIS — R00.1 BRADYCARDIA: ICD-10-CM

## 2021-04-19 NOTE — LETTER
[address] [facility] [phone]  [address] [facility] [phone]    Pacemaker/Defibrillator Clinic          04/19/21        [address] [place] [address]        Dear Jose Rafael Gillis    This letter is to inform you that we received the transmission from your monitor at home that checks your implanted heart device. The next date your monitor will automatically transmit will be 5-24-21. If your report needs attention we will notify you. Your device and monitor are wireless and most transmit cellularly, but please periodically check your monitor is still plugged in to the electrical outlet. If you still use the telephone land line to send please ensure the connection to the phone misty is secure. This will help to ensure successful automatic transmissions in the future. Also, the monitor needs to be close to you while sleeping at night. Please be aware that the remote device transmission sites are periodically monitored only during regular business hours during which simultaneous in-office device clinics are being run. If your transmission requires attention, we will contact you as soon as possible. Thank you.             Emre

## 2021-04-19 NOTE — PROGRESS NOTES
We received a remote transmission from patient's monitor at home. Remote Linq report shows nocturnal gallito recordings. EP physician to review. We will continue to monitor remotely.

## 2021-04-21 ENCOUNTER — OFFICE VISIT (OUTPATIENT)
Dept: CARDIOLOGY CLINIC | Age: 76
End: 2021-04-21
Payer: MEDICARE

## 2021-04-21 VITALS
OXYGEN SATURATION: 96 % | SYSTOLIC BLOOD PRESSURE: 120 MMHG | HEIGHT: 68 IN | DIASTOLIC BLOOD PRESSURE: 80 MMHG | WEIGHT: 229 LBS | HEART RATE: 60 BPM | BODY MASS INDEX: 34.71 KG/M2

## 2021-04-21 DIAGNOSIS — I50.32 CHRONIC DIASTOLIC HEART FAILURE (HCC): Primary | ICD-10-CM

## 2021-04-21 DIAGNOSIS — I10 BENIGN ESSENTIAL HTN: ICD-10-CM

## 2021-04-21 DIAGNOSIS — E78.5 DYSLIPIDEMIA: ICD-10-CM

## 2021-04-21 DIAGNOSIS — I48.0 PAROXYSMAL ATRIAL FIBRILLATION (HCC): ICD-10-CM

## 2021-04-21 DIAGNOSIS — R06.02 SOB (SHORTNESS OF BREATH): ICD-10-CM

## 2021-04-21 DIAGNOSIS — I49.3 PVC (PREMATURE VENTRICULAR CONTRACTION): ICD-10-CM

## 2021-04-21 PROCEDURE — 4040F PNEUMOC VAC/ADMIN/RCVD: CPT | Performed by: INTERNAL MEDICINE

## 2021-04-21 PROCEDURE — 99214 OFFICE O/P EST MOD 30 MIN: CPT | Performed by: INTERNAL MEDICINE

## 2021-04-21 PROCEDURE — G8427 DOCREV CUR MEDS BY ELIG CLIN: HCPCS | Performed by: INTERNAL MEDICINE

## 2021-04-21 PROCEDURE — G8417 CALC BMI ABV UP PARAM F/U: HCPCS | Performed by: INTERNAL MEDICINE

## 2021-04-21 PROCEDURE — 1036F TOBACCO NON-USER: CPT | Performed by: INTERNAL MEDICINE

## 2021-04-21 PROCEDURE — 1123F ACP DISCUSS/DSCN MKR DOCD: CPT | Performed by: INTERNAL MEDICINE

## 2021-04-28 ENCOUNTER — HOSPITAL ENCOUNTER (OUTPATIENT)
Age: 76
Discharge: HOME | End: 2021-04-28
Payer: MEDICARE

## 2021-04-28 DIAGNOSIS — I10 BENIGN ESSENTIAL HTN: ICD-10-CM

## 2021-04-28 DIAGNOSIS — R06.02 SOB (SHORTNESS OF BREATH): ICD-10-CM

## 2021-04-28 DIAGNOSIS — I48.0 PAROXYSMAL ATRIAL FIBRILLATION (HCC): ICD-10-CM

## 2021-04-28 DIAGNOSIS — I50.32 CHRONIC DIASTOLIC HEART FAILURE (HCC): ICD-10-CM

## 2021-04-28 DIAGNOSIS — E78.5 DYSLIPIDEMIA: ICD-10-CM

## 2021-04-28 LAB
A/G RATIO: 1.6 (ref 1.1–2.2)
ALBUMIN SERPL-MCNC: 4.2 G/DL (ref 3.4–5)
ALP BLD-CCNC: 86 U/L (ref 40–129)
ALT SERPL-CCNC: 16 U/L (ref 10–40)
ANION GAP SERPL CALCULATED.3IONS-SCNC: 11 MMOL/L (ref 3–16)
AST SERPL-CCNC: 16 U/L (ref 15–37)
BASOPHILS ABSOLUTE: 0.1 K/UL (ref 0–0.2)
BASOPHILS RELATIVE PERCENT: 1.3 %
BILIRUB SERPL-MCNC: 0.8 MG/DL (ref 0–1)
BILIRUBIN DIRECT: <0.2 MG/DL (ref 0–0.3)
BILIRUBIN, INDIRECT: NORMAL MG/DL (ref 0–1)
BUN BLDV-MCNC: 15 MG/DL (ref 7–20)
CALCIUM SERPL-MCNC: 9.3 MG/DL (ref 8.3–10.6)
CHLORIDE BLD-SCNC: 104 MMOL/L (ref 99–110)
CHOLESTEROL, TOTAL: 119 MG/DL (ref 0–199)
CO2: 25 MMOL/L (ref 21–32)
CREAT SERPL-MCNC: 1.5 MG/DL (ref 0.8–1.3)
EOSINOPHILS ABSOLUTE: 0.4 K/UL (ref 0–0.6)
EOSINOPHILS RELATIVE PERCENT: 5.8 %
GFR AFRICAN AMERICAN: 55
GFR NON-AFRICAN AMERICAN: 45
GLOBULIN: 2.7 G/DL
GLUCOSE BLD-MCNC: 103 MG/DL (ref 70–99)
HCT VFR BLD CALC: 43 % (ref 40.5–52.5)
HDLC SERPL-MCNC: 40 MG/DL (ref 40–60)
HEMOGLOBIN: 14.6 G/DL (ref 13.5–17.5)
LDL CHOLESTEROL CALCULATED: 57 MG/DL
LYMPHOCYTES ABSOLUTE: 2.2 K/UL (ref 1–5.1)
LYMPHOCYTES RELATIVE PERCENT: 29.7 %
MCH RBC QN AUTO: 31.7 PG (ref 26–34)
MCHC RBC AUTO-ENTMCNC: 34.1 G/DL (ref 31–36)
MCV RBC AUTO: 93.2 FL (ref 80–100)
MONOCYTES ABSOLUTE: 0.6 K/UL (ref 0–1.3)
MONOCYTES RELATIVE PERCENT: 8.3 %
NEUTROPHILS ABSOLUTE: 4 K/UL (ref 1.7–7.7)
NEUTROPHILS RELATIVE PERCENT: 54.9 %
PDW BLD-RTO: 14.2 % (ref 12.4–15.4)
PLATELET # BLD: 198 K/UL (ref 135–450)
PMV BLD AUTO: 11.6 FL (ref 5–10.5)
POTASSIUM SERPL-SCNC: 4.4 MMOL/L (ref 3.5–5.1)
PRO-BNP: 216 PG/ML (ref 0–449)
PROSTATE SPECIFIC ANTIGEN: 2.42 NG/ML (ref 0–4)
RBC # BLD: 4.61 M/UL (ref 4.2–5.9)
SODIUM BLD-SCNC: 140 MMOL/L (ref 136–145)
TOTAL PROTEIN: 6.9 G/DL (ref 6.4–8.2)
TRIGL SERPL-MCNC: 108 MG/DL (ref 0–150)
VLDLC SERPL CALC-MCNC: 22 MG/DL
WBC # BLD: 7.3 K/UL (ref 4–11)

## 2021-04-28 PROCEDURE — 80053 COMPREHEN METABOLIC PANEL: CPT

## 2021-04-28 PROCEDURE — 84153 ASSAY OF PSA TOTAL: CPT

## 2021-04-28 PROCEDURE — 82248 BILIRUBIN DIRECT: CPT

## 2021-04-28 PROCEDURE — 80061 LIPID PANEL: CPT

## 2021-04-28 PROCEDURE — 85025 COMPLETE CBC W/AUTO DIFF WBC: CPT

## 2021-04-28 PROCEDURE — 36415 COLL VENOUS BLD VENIPUNCTURE: CPT

## 2021-04-28 PROCEDURE — 83880 ASSAY OF NATRIURETIC PEPTIDE: CPT

## 2021-04-30 ENCOUNTER — TELEPHONE (OUTPATIENT)
Dept: CARDIOLOGY CLINIC | Age: 76
End: 2021-04-30

## 2021-04-30 NOTE — TELEPHONE ENCOUNTER
----- Message from Gerald Peterson MD sent at 4/29/2021  5:25 PM EDT -----  Please call patient and let him know that his labs look really good. No change.   Geo

## 2021-05-07 PROCEDURE — G2066 INTER DEVC REMOTE 30D: HCPCS | Performed by: INTERNAL MEDICINE

## 2021-05-07 PROCEDURE — 93298 REM INTERROG DEV EVAL SCRMS: CPT | Performed by: INTERNAL MEDICINE

## 2021-05-20 ENCOUNTER — NURSE ONLY (OUTPATIENT)
Dept: CARDIOLOGY CLINIC | Age: 76
End: 2021-05-20

## 2021-05-20 ENCOUNTER — OFFICE VISIT (OUTPATIENT)
Dept: CARDIOLOGY CLINIC | Age: 76
End: 2021-05-20
Payer: MEDICARE

## 2021-05-20 VITALS
SYSTOLIC BLOOD PRESSURE: 101 MMHG | BODY MASS INDEX: 34.04 KG/M2 | HEIGHT: 68 IN | DIASTOLIC BLOOD PRESSURE: 70 MMHG | OXYGEN SATURATION: 98 % | WEIGHT: 224.6 LBS | HEART RATE: 64 BPM

## 2021-05-20 DIAGNOSIS — I48.0 PAF (PAROXYSMAL ATRIAL FIBRILLATION) (HCC): ICD-10-CM

## 2021-05-20 DIAGNOSIS — I45.10 RBBB: ICD-10-CM

## 2021-05-20 DIAGNOSIS — I49.3 PVC (PREMATURE VENTRICULAR CONTRACTION): ICD-10-CM

## 2021-05-20 DIAGNOSIS — I10 BENIGN ESSENTIAL HTN: Primary | Chronic | ICD-10-CM

## 2021-05-20 DIAGNOSIS — R06.09 DOE (DYSPNEA ON EXERTION): ICD-10-CM

## 2021-05-20 PROCEDURE — 4040F PNEUMOC VAC/ADMIN/RCVD: CPT | Performed by: INTERNAL MEDICINE

## 2021-05-20 PROCEDURE — 93000 ELECTROCARDIOGRAM COMPLETE: CPT | Performed by: INTERNAL MEDICINE

## 2021-05-20 PROCEDURE — G8417 CALC BMI ABV UP PARAM F/U: HCPCS | Performed by: INTERNAL MEDICINE

## 2021-05-20 PROCEDURE — G8428 CUR MEDS NOT DOCUMENT: HCPCS | Performed by: INTERNAL MEDICINE

## 2021-05-20 PROCEDURE — 1036F TOBACCO NON-USER: CPT | Performed by: INTERNAL MEDICINE

## 2021-05-20 PROCEDURE — 1123F ACP DISCUSS/DSCN MKR DOCD: CPT | Performed by: INTERNAL MEDICINE

## 2021-05-20 PROCEDURE — 99214 OFFICE O/P EST MOD 30 MIN: CPT | Performed by: INTERNAL MEDICINE

## 2021-05-20 NOTE — PROGRESS NOTES
[address]       Electrophysiology     Date: 5/20/2021    CC: Fatigue/PVC's    HPI: Kristin Man is a 66 y.o. Male with a PMH of HTN, RBBB, HLD and Type 2 diabetes. On 7/25/19 he presented to [facility] ED with complaints of fatigue and right sided weakness. Was dx with TIA and discharged on plavix and asa with a 30 day monitor. 10/14/19 ILR insertion for cryptogenic stroke    He has a new diagnosis of severe AUDIE and uses CPAP      Interval history:  Michaela Celeste presents to the office in follow up. He is now on Eliquis for atrial fibrillation. Remains compliant with medication regimen. No excessive bruising or bleeding on AC. He is doing well from a cardiac standpoint and does not appear to be symptomatic with his atrial fibrillation. His wife states that he does have some increased FALCON.       Past Medical History:   Diagnosis Date    BPH     Colon polyps     DDD (degenerative disc disease), lumbar     Diverticulosis     Gallstone pancreatitis 09/05/2017    GERD (gastroesophageal reflux disease)     Hyperlipidemia     Hypertension     Impaired fasting blood sugar     Kidney stones 11/10    Lumbar spondylosis     Mild CAD 10/14/2019    Dr. Dustin Barreto, [facility]    Mild CAD 11/13/2019    OAB (overactive bladder)     AUDIE (obstructive sleep apnea) 6/4/2013    Osteoarthritis     Osteoarthritis of right knee     Pneumonia     pneumonia    RBBB 12/19/2016    Renal cyst     Retinal vasculitis     Retinal vasculitis     Right hemiparesis ([address] 7/25/2019    Right ureteral stone     Type 2 diabetes mellitus without complication, without long-term current use of insulin (HCC)     diet controlled    Type 2 diabetes mellitus without complication, without long-term current use of insulin ([address]         Past Surgical History:   Procedure Laterality Date    APPENDECTOMY      BONE RESECTION Left     bone spur removal left elbow    CARDIAC CATHETERIZATION  3/23/2012    CATARACT REMOVAL WITH IMPLANT Left 11/01/2017  CATARACT REMOVAL WITH IMPLANT Right 10/2017    CHOLECYSTECTOMY, LAPAROSCOPIC  09/07/2017    COLONOSCOPY  7/2008    multiple    COLONOSCOPY N/A 1/15/2020    COLONOSCOPY POLYPECTOMY SNARE/COLD BIOPSY performed by Joelle Pierce MD at [address]  12/2/2014    Retrograde Pyelogram, stent, Dr. Azar Second N/A 12/13/2018    CYSTOSCOPY, LEFT RETROGRADE PYELOGRAM, LEFT STENT PLACEMENT performed by Keith Miguel MD at [facility] Left 1/2/2019    CYSTOSCOPY LEFT URETEROSCOPY HOLMIUM LASER LITHOTRIPSY, STONE MANIPULATION WITH LEFT STENT EXCHANGE performed by Keith Miguel MD at [place] [address]  3/28/2012    laparoscopic    TONSILLECTOMY      VENTRICULAR ABLATION SURGERY  09/22/2020       Allergies:  No Known Allergies    Social History:   reports that he quit smoking about 44 years ago. His smoking use included cigarettes. He has a 16.00 pack-year smoking history. He has never used smokeless tobacco. He reports current alcohol use of about 4.0 standard drinks of alcohol per week. He reports that he does not use drugs. Family History:  family history includes Cancer in his father; Diabetes in his brother; Mental Illness in his mother; Other in his sister; Sleep Apnea in his brother. Reviewed. Denies family history of sudden cardiac death, arrhythmia, premature CAD    Review of System:  All other systems reviewed and are negative except for that noted above.  Pertinent negatives are:   General: negative for fever, chills   Ophthalmic ROS: negative for - eye pain or loss of vision  ENT ROS: negative for - headaches, sore throat   Respiratory: negative for - cough, sputum  Cardiovascular: Reviewed in HPI  Gastrointestinal: negative for - abdominal pain, diarrhea, N/V  Hematology: negative for - bleeding, blood clots, bruising or jaundice  Genito-Urinary:  negative for - Dysuria or incontinence  Musculoskeletal: negative for - Joint swelling, muscle pain  Neurological: negative for - confusion, dizziness, headaches   Psychiatric: No anxiety, no depression. Dermatological: negative for - rash    Physical Examination:  Vitals:    21 1102   BP: 101/70   Pulse: 64   SpO2: 98%      Wt Readings from Last 3 Encounters:   21 224 lb 9.6 oz (101.9 kg)   21 229 lb (103.9 kg)   20 231 lb 9.6 oz (105.1 kg)       · Constitutional: Oriented. No distress. · Head: Normocephalic and atraumatic. · Mouth/Throat: Oropharynx is clear and moist.   · Eyes: Conjunctivae normal. EOM are normal.   · Neck: Neck supple. No rigidity. No JVD present. · Cardiovascular: Normal rate, regular rhythm, S1&S2. · Pulmonary/Chest: Bilateral respiratory sounds. No wheezes, No rhonchi. · Abdominal: Soft. Bowel sounds present. No distension, No tenderness. · Musculoskeletal: No tenderness. No edema    · Lymphadenopathy: Has no cervical adenopathy. · Neurological: Alert and oriented. Cranial nerve appears intact, No Gross deficit   · Skin: Skin is warm and dry. No rash noted. · Psychiatric: Has a normal behavior       Labs, diagnostic and imaging results reviewed. Reviewed. Lab Results   Component Value Date    TSH 0.97 2020    TSH 1.00 2020    CREATININE 1.5 2021    CREATININE 1.0 2020    AST 16 2021    ALT 16 2021       EC21   Sinus Bradycardia     MCOT:  -19  Sinus rhythm with frequent PVC's  PVC burden 25%  PAC burden 3%  High   Low HR 42  Average HR 69    Echo: 19  Summary   -Arrhythmia noted.   -Normal left ventricle size and systolic function with an estimated ejection   fraction of 55%. There is concentric left ventricular hypertrophy. Grade I   diastolic dysfunction with elevated LV filling pressures.   -Mitral annular calcification is present.   -Mild mitral regurgitation.   -Aortic valve appears sclerotic but opens adequately.   -Mild tricuspid regurgitation with PASP of 34 mmHg    Nuclear stress:  10/4/19  Summary   No EKG evidence for ischemia with lexiscan   Normal LV size and systolic function.   Myocardial perfusion imaging with small area of decreased uptake in the   inferoapical wall with stress and rest consistent with scar in the territory   typical of the RCA, Cx or LAD arteries.   No significant reversible ischemia    Cath: 10/14/19   LM       Normal              LAD     20% mid              Cx        Normal              RCA     Normal, nondominant              LVG     Not performed              EDP     12 mmHg    Medication:  Current Outpatient Medications   Medication Sig Dispense Refill    pravastatin (PRAVACHOL) 80 MG tablet Take 1 tablet by mouth every evening 90 tablet 3    losartan (COZAAR) 100 MG tablet Take 1 tablet by mouth daily 90 tablet 3    ezetimibe (ZETIA) 10 MG tablet Take 1 tablet by mouth daily 90 tablet 3    apixaban (ELIQUIS) 5 MG TABS tablet Take 1 tablet by mouth 2 times daily 180 tablet 3    tamsulosin (FLOMAX) 0.4 MG capsule Take 1 capsule by mouth daily 90 capsule 3    blood glucose test strips (GLUCOSE METER TEST) strip 1 each by In Vitro route daily 300 each 4    Lancets MISC 1 each by Does not apply route daily 300 each 4    hydroCHLOROthiazide (HYDRODIURIL) 25 MG tablet Take 25 mg by mouth See Admin Instructions Take 5 days weekly      amLODIPine (NORVASC) 5 MG tablet Take 5 mg by mouth daily      omeprazole (PRILOSEC) 40 MG delayed release capsule Take 1 capsule by mouth daily 90 capsule 3    mometasone (NASONEX) 50 MCG/ACT nasal spray 2 sprays by Each Nostril route daily (Patient taking differently: 2 sprays by Each Nostril route daily as needed ) 3 Inhaler 3    b complex vitamins capsule Take 1 capsule by mouth daily      solifenacin (VESICARE) 10 MG tablet Take 10 mg by mouth daily       aspirin 81 MG tablet Take 81 mg by mouth daily      Cholecalciferol (VITAMIN D) 2000 UNITS CAPS capsule Take 1 capsule by mouth daily       Glucosamine HCl-MSM (GLUCOSAMINE-MSM PO) Take 1 tablet by mouth 2 times daily        No current facility-administered medications for this visit. Assessment and plan:   PAF   ECG today shows Sinus     [id number] Vasc score  6   Continue Eliquis 5 mg BID      Late last year an episode of A. fib was identified on his loop monitor. I have reviewed that episode and although 1 cannot be 100% sure about it, it is likely to be atrial fibrillation. He was already started on anticoagulation I would continue. We will monitor the burden and depending on the frequency, burden, symptoms we will consider further actions including possibility of use of antiarrhythmic or ablation. Discussed in detail about the risk factors, pathophysiology, and treatment options including life style modifications for atrial fibrillation. · Eat heart healthy diet  · Minimize alcohol intake  · Minimize caffeine and soda   · Keep your blood pressure under control. · Keep your blood sugar under control.    · Stop smoking  · Be active, keep your body weight optimal  · Exercise on a regular basis  · Manage your stress   · If you snore, get evaluated for sleep apnea  · Be aware of the symptoms of stroke    FALCON   Last echo 7/2020  EF 55-60%   Will repeat echo d/t increased symptoms     PVC's   S/p PVC ablation 9/23/2020    25 % PVC burden on MCOT 8/22/19 and frequent on Implantable Loop recorder and ECG   Could be causing the symptoms of fatigue and shortness of breath    No significant CAD   Will discuss options of observation/antiarrhythmic drugs / ablation next time      CAD   20% LAD lesion       TIA/cryptogenic    Plavix, ASA   Followed by neurology   Atrial fibrillation noted on previous device interrogation         HTN  Vitals:    05/20/21 1102   BP: 101/70   Pulse: 64   SpO2: 98%      Controlled   Home BP monitoring encourage with a BP goal <130/80    RBBB   Per ECG    Obesity  Body mass index is 34.15 kg/m². - Excessive weight is complicating assessment and treatment. It is placing patient at risk for multiple co-morbidities as well as early death and contributing to the patient's presentation. - discussed weight management with diet and exercise                  Plan:    Repeat Echo  No medication changes   9 months NPSR        Thank you for allowing me to participate in the care of Pastor Yung. Further evaluation will be based upon the patient's clinical course and testing results. All questions and concerns were addressed to the patient/family. Alternatives to my treatment were discussed. I have discussed the above stated plan and the patient verbalized understanding and agreed with the plan. NOTE: This report was transcribed using voice recognition software. Every effort was made to ensure accuracy, however, inadvertent computerized transcription errors may be present. Yoly Burton MD, [address]   Office: [phone]     Scribe attestation:  This note was scribed in the presence of Yoly Burton MD by Parag Leon RN    I, Dr. Yoly Burton personally performed the services described in this documentation as scribed by RN in my presence, and it is both accurate and complete.

## 2021-05-20 NOTE — PROGRESS NOTES
Patient comes in for interrogation of their implanted loop recorder. Interrogation shows Naren episodes during sleep time hours. Last on 5/12/2021, longest 24 seconds with a Min V rate of <30 bpm. 1 Tachy episode noted on 11/14/2020. AF with <0.1% burden. Last noted on 11/19/2020. Ectopy noted. Implanted for Stroke. Patient remains on Eliquis. Please see interrogation for more detail. Patient will see Dr. Raoul Rodriguez today and we will continue to follow the Patient remotely.

## 2021-05-24 ENCOUNTER — NURSE ONLY (OUTPATIENT)
Dept: CARDIOLOGY CLINIC | Age: 76
End: 2021-05-24

## 2021-05-24 ENCOUNTER — TELEPHONE (OUTPATIENT)
Dept: CARDIOLOGY CLINIC | Age: 76
End: 2021-05-24

## 2021-05-24 DIAGNOSIS — R00.1 BRADYCARDIA: ICD-10-CM

## 2021-05-24 DIAGNOSIS — Z45.09 ENCOUNTER FOR ELECTRONIC ANALYSIS OF REVEAL EVENT RECORDER: ICD-10-CM

## 2021-05-24 PROCEDURE — G2066 INTER DEVC REMOTE 30D: HCPCS | Performed by: INTERNAL MEDICINE

## 2021-05-24 PROCEDURE — 93298 REM INTERROG DEV EVAL SCRMS: CPT | Performed by: INTERNAL MEDICINE

## 2021-05-24 RX ORDER — HYDROCHLOROTHIAZIDE 25 MG/1
25 TABLET ORAL SEE ADMIN INSTRUCTIONS
Qty: 60 TABLET | Refills: 3 | Status: SHIPPED | OUTPATIENT
Start: 2021-05-24 | End: 2022-04-21 | Stop reason: SDUPTHER

## 2021-05-24 RX ORDER — HYDROCHLOROTHIAZIDE 25 MG/1
25 TABLET ORAL SEE ADMIN INSTRUCTIONS
Qty: 60 TABLET | Refills: 3 | Status: SHIPPED | OUTPATIENT
Start: 2021-05-24 | End: 2021-05-24 | Stop reason: SDUPTHER

## 2021-05-24 NOTE — TELEPHONE ENCOUNTER
Medication Refill    Medication needing refilled: hydroCHLOROthiazide (HYDRODIURIL) 25 MG tablet     Dosage of the medication: 25 mg    How are you taking this medication (QD, BID, TID, QID, PRN): Take 25 mg by mouth See Admin Instructions Take 5 days weekly    30 or 90 day supply called in: 90    When will you run out of your medication: Has about 8-10 pills left    Which Pharmacy are we sending the medication to?: [facility], [place] - [address] [place], [place] [phone] -  [phone]   [address] [place] [address] [address]   Phone:  [phone]  Fax:  [phone]

## 2021-05-24 NOTE — LETTER
[address] [facility] [phone]  [address] [phone]    Pacemaker/Defibrillator Clinic    05/24/21      [address] [place] [address]      Dear Huan Nash    This letter is to inform you that we received the transmission from your monitor at home that checks your implanted heart device. The next date your monitor will automatically transmit will be 6-28-21. If your report needs attention we will notify you. Your device and monitor are wireless and most transmit cellularly, but please periodically check your monitor is still plugged in to the electrical outlet. If you still use the telephone land line to send please ensure the connection to the phone misty is secure. This will help to ensure successful automatic transmissions in the future. Also, the monitor needs to be close to you while sleeping at night. Please be aware that the remote device transmission sites are periodically monitored only during regular business hours during which simultaneous in-office device clinics are being run. If your transmission requires attention, we will contact you as soon as possible. **PLEASE NOTE** that our [facility] policy and processes are changing to ensure a more seamless approach for all parties involved, allowing more time for our nurses to address patient issues and concerns. We will no longer be sending letters for NORMAL remote transmissions. You will be contacted by phone if your transmission requires attention (as previously done), and letters will only be sent regarding monitor disconnections or missed transmissions if you are unable to be reached by phone. Please do not be alarmed by this new process, as we will continue to contact you if your transmission report requires attention. This will be your final \"remote received\" letter.   From this point forward, the [facility] will be utilizing the no news is good news approach. As always, please feel free to contact your nurse with any questions or concerns. Thank you.     [address]

## 2021-06-18 ENCOUNTER — HOSPITAL ENCOUNTER (OUTPATIENT)
Dept: NON INVASIVE DIAGNOSTICS | Age: 76
Discharge: HOME | End: 2021-06-18
Payer: MEDICARE

## 2021-06-18 DIAGNOSIS — R06.09 DOE (DYSPNEA ON EXERTION): ICD-10-CM

## 2021-06-18 LAB
LV EF: 55 %
LVEF MODALITY: NORMAL

## 2021-06-18 PROCEDURE — 93306 TTE W/DOPPLER COMPLETE: CPT

## 2021-06-28 ENCOUNTER — NURSE ONLY (OUTPATIENT)
Dept: CARDIOLOGY CLINIC | Age: 76
End: 2021-06-28
Payer: MEDICARE

## 2021-06-28 DIAGNOSIS — R00.1 BRADYCARDIA: ICD-10-CM

## 2021-06-28 DIAGNOSIS — Z45.09 ENCOUNTER FOR ELECTRONIC ANALYSIS OF REVEAL EVENT RECORDER: ICD-10-CM

## 2021-06-28 PROCEDURE — G2066 INTER DEVC REMOTE 30D: HCPCS | Performed by: INTERNAL MEDICINE

## 2021-06-28 PROCEDURE — 93298 REM INTERROG DEV EVAL SCRMS: CPT | Performed by: INTERNAL MEDICINE

## 2021-07-09 ENCOUNTER — TELEPHONE (OUTPATIENT)
Dept: CARDIOLOGY CLINIC | Age: 76
End: 2021-07-09

## 2021-07-09 NOTE — TELEPHONE ENCOUNTER
Echo reviewed. EF 55% with mild valvular changes. No changes from prior echo. No changes from EP standpoint.     Reginaldo Troy, APRN-CNP

## 2021-07-29 NOTE — PROGRESS NOTES
We received a remote transmission from patient's monitor at home. Remote Linq report shows nocturnal gallito recordings. AF is not real. EP physician to review.  We will continue to monitor remotely.

## 2021-08-02 ENCOUNTER — TELEPHONE (OUTPATIENT)
Dept: PULMONOLOGY | Age: 76
End: 2021-08-02

## 2021-08-02 ENCOUNTER — NURSE ONLY (OUTPATIENT)
Dept: CARDIOLOGY CLINIC | Age: 76
End: 2021-08-02
Payer: MEDICARE

## 2021-08-02 DIAGNOSIS — Z45.09 ENCOUNTER FOR ELECTRONIC ANALYSIS OF REVEAL EVENT RECORDER: ICD-10-CM

## 2021-08-02 DIAGNOSIS — R00.1 BRADYCARDIA: ICD-10-CM

## 2021-08-02 NOTE — TELEPHONE ENCOUNTER
It's been over 1yr since his LOV. Left  informing him he'll get the supplies reordered at his appt tomorrow.

## 2021-08-02 NOTE — TELEPHONE ENCOUNTER
Patient needs supplies for machine. Has scheduled earlier for yearly visit. DME is [facility] (fax: [phone]).

## 2021-08-03 ENCOUNTER — OFFICE VISIT (OUTPATIENT)
Dept: PULMONOLOGY | Age: 76
End: 2021-08-03
Payer: MEDICARE

## 2021-08-03 VITALS
SYSTOLIC BLOOD PRESSURE: 113 MMHG | HEART RATE: 62 BPM | DIASTOLIC BLOOD PRESSURE: 77 MMHG | BODY MASS INDEX: 34.56 KG/M2 | WEIGHT: 228 LBS | TEMPERATURE: 97 F | HEIGHT: 68 IN

## 2021-08-03 DIAGNOSIS — E66.9 OBESITY (BMI 30-39.9): Chronic | ICD-10-CM

## 2021-08-03 DIAGNOSIS — E11.9 TYPE 2 DIABETES MELLITUS WITHOUT COMPLICATION, WITHOUT LONG-TERM CURRENT USE OF INSULIN (HCC): Chronic | ICD-10-CM

## 2021-08-03 DIAGNOSIS — I10 BENIGN ESSENTIAL HTN: Chronic | ICD-10-CM

## 2021-08-03 DIAGNOSIS — G47.33 OBSTRUCTIVE SLEEP APNEA (ADULT) (PEDIATRIC): Chronic | ICD-10-CM

## 2021-08-03 PROCEDURE — G8417 CALC BMI ABV UP PARAM F/U: HCPCS | Performed by: INTERNAL MEDICINE

## 2021-08-03 PROCEDURE — 4040F PNEUMOC VAC/ADMIN/RCVD: CPT | Performed by: INTERNAL MEDICINE

## 2021-08-03 PROCEDURE — 1123F ACP DISCUSS/DSCN MKR DOCD: CPT | Performed by: INTERNAL MEDICINE

## 2021-08-03 PROCEDURE — 1036F TOBACCO NON-USER: CPT | Performed by: INTERNAL MEDICINE

## 2021-08-03 PROCEDURE — 99214 OFFICE O/P EST MOD 30 MIN: CPT | Performed by: INTERNAL MEDICINE

## 2021-08-03 PROCEDURE — G8427 DOCREV CUR MEDS BY ELIG CLIN: HCPCS | Performed by: INTERNAL MEDICINE

## 2021-08-03 ASSESSMENT — SLEEP AND FATIGUE QUESTIONNAIRES
HOW LIKELY ARE YOU TO NOD OFF OR FALL ASLEEP WHILE SITTING QUIETLY AFTER LUNCH WITHOUT ALCOHOL: 1
HOW LIKELY ARE YOU TO NOD OFF OR FALL ASLEEP WHILE WATCHING TV: 3
NECK CIRCUMFERENCE (INCHES): 17
HOW LIKELY ARE YOU TO NOD OFF OR FALL ASLEEP WHILE SITTING INACTIVE IN A PUBLIC PLACE: 0
ESS TOTAL SCORE: 8
HOW LIKELY ARE YOU TO NOD OFF OR FALL ASLEEP WHILE SITTING AND TALKING TO SOMEONE: 0
HOW LIKELY ARE YOU TO NOD OFF OR FALL ASLEEP IN A CAR, WHILE STOPPED FOR A FEW MINUTES IN TRAFFIC: 0
HOW LIKELY ARE YOU TO NOD OFF OR FALL ASLEEP WHILE LYING DOWN TO REST IN THE AFTERNOON WHEN CIRCUMSTANCES PERMIT: 2
HOW LIKELY ARE YOU TO NOD OFF OR FALL ASLEEP WHEN YOU ARE A PASSENGER IN A CAR FOR AN HOUR WITHOUT A BREAK: 1
HOW LIKELY ARE YOU TO NOD OFF OR FALL ASLEEP WHILE SITTING AND READING: 1

## 2021-08-03 NOTE — PROGRESS NOTES
Cari Espinosa         : 1945    Diagnosis: [x] AUDIE (G47.33) [] CSA (G47.31) [] Apnea (G47.30)   Length of Need: [x] 13 Months [] 99 Months [] Other:    Machine (JUAN MIGUEL!): [] Respironics Dream Station      Auto [] ResMed AirSense     Auto [] Other:     []  CPAP () [] Bilevel ()   Mode: [] Auto [] Spontaneous    Mode: [] Auto [] Spontaneous              Comfort Settings:        Humidifier: [] Heated ()        [x] Water chamber replacement ()/ 1 per 6 months        Mask:   [] Nasal () /1 per 3 months [x] Full Face () /1 per 3 months   [] Patient choice -Size and fit mask [x] Patient Choice - Size and fit mask   [] Dispense:  [] Dispense:    [] Headgear () / 1 per 3 months [x] Headgear () / 1 per 3 months   [] Replacement Nasal Cushion ()/2 per month [x] Interface Replacement ()/1 per month   [] Replacement Nasal Pillows ()/2 per month         Tubing: [x] Heated ()/1 per 3 months    [] Standard ()/1 per 3 months [] Other:           Filters: [x] Non-disposable ()/1 per 6 months     [x] Ultra-Fine, Disposable ()/2 per month        Miscellaneous: [] Chin Strap ()/ 1 per 6 months [] O2 bleed-in:       LPM   [] Oximetry on CPAP/Bilevel []  Other:    [x] Modem: ()         Start Order Date: 21    MEDICAL JUSTIFICATION:  I, the undersigned, certify that the above prescribed supplies are medically necessary for this patients wellbeing. In my opinion, the supplies are both reasonable and necessary in reference to accepted standards of medicalpractice in treatment of this patients condition.     Coral Grijalva MD      NPI: [id number]       Order Signed Date: 21    Electronically signed by Coral Grijalva MD on 8/3/2021 at 1:14 PM    Cari Espinosa  1945  [address]  [phone] (home) [phone] (work)  [phone] (mobile)      Insurance Info (confirm with patient if correct):  Payor/Plan Subscr  Sex Relation Sub.  Ins. ID Effective Group Num

## 2021-08-03 NOTE — PROGRESS NOTES
HTN  Assessment & Plan:  Chronic- Stable. Discussed the importance of treating obstructive sleep apnea as part of the management of this disorder. Cont any meds per PCP and other physicians. 3. Type 2 diabetes mellitus without complication, without long-term current use of insulin ([facility])  Assessment & Plan:  Chronic- Stable. Discussed the importance of treating obstructive sleep apnea as part of the management of this disorder. Cont any meds per PCP and other physicians. 4. Obesity (BMI 30-39. 9)  Assessment & Plan:  Chronic-not stable:  Discussed importance of treating obstructive sleep apnea and getting sufficient sleep to assist with weight control. Encouraged him to work on weight loss through diet and exercise. Recommended DASH or Mediterranean diets. Reviewed, analyzed, and documented physiologic data from patient's PAP machine. This information was analyzed to assess complexity and medical decision making in regards to further testing and management. Diagnoses of Obstructive sleep apnea (adult) (pediatric), Benign essential HTN, Type 2 diabetes mellitus without complication, without long-term current use of insulin ([facility] and Obesity (BMI 30-39. 9) were pertinent to this visit. The chronic medical conditions listed are directly related to the primary diagnosis listed above. The management of the primary diagnosis affects the secondary diagnosis and vice versa. Subjective:   Subjective   Patient ID: Alfred Sibley is a 66 y.o. male.     Chief Complaint   Patient presents with    Sleep Apnea       HPI:  Machine Modem/Download Info:  Compliance (hours/night): 3.3 hrs/night  % of nights >= 4 hrs: 32.2 %  Download AHI (/hour): 2.6 /HR  Average IPAP Pressure: 15 cmH2O  Average EPAP Pressure: 12 cmH2O    AUTO BIPAP - Settings (Srinivas)  IPAP Max: 25 cmH2O  EPAP Min: 10 cmH2O  Pressure Support Min: 2  Pressure Support Max: 8             Comfort Settings  Humidity Level (0-8): 3  Flex/EPR (0-3): 3       Feels he is taking his mask off the middle the night but he is not sure why. At times the mask bothers him but other times he is not sure that he is taking it off. Wife states he does not sleep as well without his machine and has more breathing issues at night without his machine. DME [facility]    [place] - Total score: 8    Social History     Socioeconomic History    Marital status:      Spouse name: Jv Bliss Number of children: 3    Years of education: 12    Highest education level: Not on file   Occupational History    Not on file   Tobacco Use    Smoking status: Former Smoker     Packs/day: 1.00     Years: 16.00     Pack years: 16.00     Types: Cigarettes     Quit date: 1977     Years since quittin.2    Smokeless tobacco: Never Used    Tobacco comment: smoked for 12 ys / smoked up to 1 ppd   Vaping Use    Vaping Use: Never used   Substance and Sexual Activity    Alcohol use: Yes     Alcohol/week: 4.0 standard drinks     Types: 1 Glasses of wine, 1 Cans of beer, 2 Standard drinks or equivalent per week     Comment: ONCE A WEEK    Drug use: Never    Sexual activity: Yes     Partners: Female   Other Topics Concern    Not on file   Social History Narrative    Not on file     Social Determinants of Health     Financial Resource Strain:     Difficulty of Paying Living Expenses:    Food Insecurity:     Worried About Running Out of Food in the Last Year:     Ran Out of Food in the Last Year:    Transportation Needs:     Lack of Transportation (Medical):      Lack of Transportation (Non-Medical):    Physical Activity:     Days of Exercise per Week:     Minutes of Exercise per Session:    Stress:     Feeling of Stress :    Social Connections:     Frequency of Communication with Friends and Family:     Frequency of Social Gatherings with Friends and Family:     Attends [employer] Services:     Active Member of Clubs or Organizations:     Attends Club or Organization Meetings:     Marital Status:    Intimate Partner Violence:     Fear of Current or Ex-Partner:     Emotionally Abused:     Physically Abused:     Sexually Abused:        Current Outpatient Medications   Medication Instructions    amLODIPine (NORVASC) 5 mg, Oral, DAILY    apixaban (ELIQUIS) 5 mg, Oral, 2 TIMES DAILY    aspirin 81 mg, Oral, DAILY    b complex vitamins capsule 1 capsule, Oral, DAILY    blood glucose test strips (GLUCOSE METER TEST) strip 1 each, In Vitro, DAILY    Cholecalciferol (VITAMIN D) 2000 UNITS CAPS capsule 1 capsule, Oral, DAILY    ezetimibe (ZETIA) 10 mg, Oral, DAILY    Glucosamine HCl-MSM (GLUCOSAMINE-MSM PO) 1 tablet, Oral, 2 TIMES DAILY    hydroCHLOROthiazide (HYDRODIURIL) 25 mg, Oral, SEE ADMIN INSTRUCTIONS, Take 1 tablet 5 days weekly, Monday, Wednesday, Thursday, Friday and sunday    Lancets MISC 1 each, Does not apply, DAILY    losartan (COZAAR) 100 mg, Oral, DAILY    mometasone (NASONEX) 50 MCG/ACT nasal spray 2 sprays, Each Nostril, DAILY    omeprazole (PRILOSEC) 40 mg, Oral, DAILY    pravastatin (PRAVACHOL) 80 mg, Oral, EVERY EVENING    solifenacin (VESICARE) 10 mg, Oral, DAILY    tamsulosin (FLOMAX) 0.4 mg, Oral, DAILY          Vitals:  Weight BMI   Wt Readings from Last 3 Encounters:   08/03/21 228 lb (103.4 kg)   05/20/21 224 lb 9.6 oz (101.9 kg)   04/21/21 229 lb (103.9 kg)    Body mass index is 35.18 kg/m².      BP HR SaO2   BP Readings from Last 3 Encounters:   08/03/21 113/77   05/20/21 101/70   04/21/21 120/80    Pulse Readings from Last 3 Encounters:   08/03/21 62   05/20/21 64   04/21/21 60    SpO2 Readings from Last 3 Encounters:   05/20/21 98%   04/21/21 96%   11/12/20 96%        Electronically signed by Maggi Kraft MD on 8/3/2021 at 1:12 PM

## 2021-08-03 NOTE — LETTER
[facility]  [address] [facility]  [place] [address] [address]  Phone: [phone]  Fax: [phone]    Karlos Valentine MD    August 3, 2021     [address] [id number]    Patient: Renea Hogan   MR Number: [id number]   YOB: 1945   Date of Visit: 8/3/2021       Dear Rubio Villeda:    Thank you for referring Nicolas Caal to me for evaluation/treatment. Below are the relevant portions of my assessment and plan of care. If you have questions, please do not hesitate to call me. I look forward to following Winston Espinoza along with you.     Sincerely,        Karlos Valentine MD

## 2021-08-03 NOTE — ASSESSMENT & PLAN NOTE
Chronic-with progression/exacerbation:  Continue medications per his PCP and other physicians. Instructed not to drive unless had 4 hrs of effective therapy for his AUDIE the night before. Did review the risks of under or untreated AUDIE including, but not limited to, higher risks of motor vehicle accidents, stroke, heart attacks, and death. He understands and accepts all these risks. Discussed importance of try to keep his mask on each and every night, all night. We will send prescription for new supplies and have him work with his equipment company to try different mask. Discussed with patient today the recent recall issued by [employer] for their [employer] platform Pap machines. Reviewed that it affected a very small number of machines (0.03%) and seems to be exacerbated by using ozone disinfection or machine being exposed to a very high heat/humidity environment. Recommended the patient immediately discontinue use of any external ozone  if being used. Discussed the risk of untreated sleep apnea (including but not limited to early morbidity mortality, motor vehicle accidents, and cardiovascular issues, etc.) versus the very small risk of foam degradation with use of the machine. It is recommend the patient use an inline bacteriostatic filter if they wish to continue therapy. Possible alternative may be to switch manufacturers for their machine but will need to see if their insurance company will allow that.

## 2021-08-06 PROCEDURE — 93298 REM INTERROG DEV EVAL SCRMS: CPT | Performed by: INTERNAL MEDICINE

## 2021-08-06 PROCEDURE — G2066 INTER DEVC REMOTE 30D: HCPCS | Performed by: INTERNAL MEDICINE

## 2021-08-31 NOTE — PROGRESS NOTES
We received a remote transmission from patient's monitor at home. Remote Linq report shows 1 nocturnal gallito recording and 1 possible AF recording. AF recording shows a lot of artifact. Pt does take Eliquis. EP physician to review. We will continue to monitor remotely.

## 2021-09-02 ENCOUNTER — NURSE ONLY (OUTPATIENT)
Dept: CARDIOLOGY CLINIC | Age: 76
End: 2021-09-02

## 2021-09-02 DIAGNOSIS — R00.1 BRADYCARDIA: ICD-10-CM

## 2021-09-02 DIAGNOSIS — Z45.09 ENCOUNTER FOR ELECTRONIC ANALYSIS OF REVEAL EVENT RECORDER: ICD-10-CM

## 2021-09-02 PROCEDURE — G2066 INTER DEVC REMOTE 30D: HCPCS | Performed by: INTERNAL MEDICINE

## 2021-09-02 PROCEDURE — 93298 REM INTERROG DEV EVAL SCRMS: CPT | Performed by: INTERNAL MEDICINE

## 2021-09-02 NOTE — RESULT ENCOUNTER NOTE
Device interrogation reviewed. 2 of the 3 \"Afib\" episodes are sinus with PAC or else sinus with 2nd degree AV block type I. The 3rd episode considered \"Afib\" has too many baseline artifact to elucidate true atrial fibrillation. Continue to monitor.

## 2021-10-11 ENCOUNTER — NURSE ONLY (OUTPATIENT)
Dept: CARDIOLOGY CLINIC | Age: 76
End: 2021-10-11
Payer: MEDICARE

## 2021-10-11 DIAGNOSIS — Z45.09 ENCOUNTER FOR ELECTRONIC ANALYSIS OF REVEAL EVENT RECORDER: ICD-10-CM

## 2021-10-11 DIAGNOSIS — R00.1 BRADYCARDIA: ICD-10-CM

## 2021-10-11 PROCEDURE — G2066 INTER DEVC REMOTE 30D: HCPCS | Performed by: INTERNAL MEDICINE

## 2021-10-11 PROCEDURE — 93298 REM INTERROG DEV EVAL SCRMS: CPT | Performed by: INTERNAL MEDICINE

## 2021-10-12 ENCOUNTER — TELEPHONE (OUTPATIENT)
Dept: PULMONOLOGY | Age: 76
End: 2021-10-12

## 2021-10-12 NOTE — TELEPHONE ENCOUNTER
Patient was asked to register machine by calling [phone]. Patient was instructed to continue using machine and a message will be sent to their provider about their individual plan of care. Patient was instructed not to store machine where it is exposed to extreme heat, cold, or direct sunlight, not to travel with it in the trunk of their car, not to use any after-market  like the So Clean or Ozone . Patient was instructed to quit using the machine and call the office if they notice any particles in the tubing while cleaning, any unusual odors coming from the machine or have any new onset of symptoms like cough or headache. Patient was informed that we will call them back if the provider has any further instructions or wants them to stop using their machine, otherwise, continue using machine.

## 2021-10-12 NOTE — TELEPHONE ENCOUNTER
Patient left message about appointment with Royal Bruno. Called patient back and asked him to call the office back. Appointment with Soto Villatoro looks like it had been canceled.

## 2021-10-13 NOTE — TELEPHONE ENCOUNTER
Given the severity of his sleep apnea and his comorbid conditions it is recommended that hecontinue therapy until his machine can be repaired/replaced or if his insurance will allow we can prescribe machine from a different manufacture although many those are also on backorder currently. he should contact his equipment company to discuss getting a inline filter to use in the interim till his machine can be repaired/replaced. he also should make sure that he registers his machine to either his equipment company or the [employer] website so that way he can be contacted when they have a full plan as far as repair/replacement.     Elisabeth Saldivar MD

## 2021-10-25 ENCOUNTER — HOSPITAL ENCOUNTER (OUTPATIENT)
Age: 76
Discharge: HOME | End: 2021-10-25
Payer: MEDICARE

## 2021-10-25 ENCOUNTER — OFFICE VISIT (OUTPATIENT)
Dept: CARDIOLOGY CLINIC | Age: 76
End: 2021-10-25
Payer: MEDICARE

## 2021-10-25 VITALS
OXYGEN SATURATION: 94 % | BODY MASS INDEX: 34.4 KG/M2 | SYSTOLIC BLOOD PRESSURE: 100 MMHG | DIASTOLIC BLOOD PRESSURE: 50 MMHG | HEART RATE: 67 BPM | WEIGHT: 227 LBS | HEIGHT: 68 IN

## 2021-10-25 DIAGNOSIS — I50.32 CHRONIC DIASTOLIC HEART FAILURE (HCC): Primary | ICD-10-CM

## 2021-10-25 DIAGNOSIS — E55.9 VITAMIN D DEFICIENCY: ICD-10-CM

## 2021-10-25 DIAGNOSIS — I48.0 PAF (PAROXYSMAL ATRIAL FIBRILLATION) (HCC): ICD-10-CM

## 2021-10-25 DIAGNOSIS — I50.32 CHRONIC DIASTOLIC HEART FAILURE (HCC): ICD-10-CM

## 2021-10-25 DIAGNOSIS — E78.5 HYPERLIPIDEMIA, UNSPECIFIED HYPERLIPIDEMIA TYPE: ICD-10-CM

## 2021-10-25 DIAGNOSIS — R06.02 SOB (SHORTNESS OF BREATH): ICD-10-CM

## 2021-10-25 DIAGNOSIS — I10 BENIGN ESSENTIAL HTN: ICD-10-CM

## 2021-10-25 DIAGNOSIS — R06.09 DOE (DYSPNEA ON EXERTION): ICD-10-CM

## 2021-10-25 LAB
A/G RATIO: 1.3 (ref 1.1–2.2)
ALBUMIN SERPL-MCNC: 4.1 G/DL (ref 3.4–5)
ALP BLD-CCNC: 95 U/L (ref 40–129)
ALT SERPL-CCNC: 20 U/L (ref 10–40)
ANION GAP SERPL CALCULATED.3IONS-SCNC: 14 MMOL/L (ref 3–16)
AST SERPL-CCNC: 21 U/L (ref 15–37)
BILIRUB SERPL-MCNC: 0.6 MG/DL (ref 0–1)
BUN BLDV-MCNC: 16 MG/DL (ref 7–20)
CALCIUM SERPL-MCNC: 9.8 MG/DL (ref 8.3–10.6)
CHLORIDE BLD-SCNC: 106 MMOL/L (ref 99–110)
CHOLESTEROL, TOTAL: 129 MG/DL (ref 0–199)
CO2: 23 MMOL/L (ref 21–32)
CREAT SERPL-MCNC: 1.3 MG/DL (ref 0.8–1.3)
GFR AFRICAN AMERICAN: >60
GFR NON-AFRICAN AMERICAN: 53
GLOBULIN: 3.2 G/DL
GLUCOSE BLD-MCNC: 113 MG/DL (ref 70–99)
HCT VFR BLD CALC: 44.3 % (ref 40.5–52.5)
HDLC SERPL-MCNC: 43 MG/DL (ref 40–60)
HEMOGLOBIN: 14.5 G/DL (ref 13.5–17.5)
LDL CHOLESTEROL CALCULATED: 65 MG/DL
MCH RBC QN AUTO: 30.9 PG (ref 26–34)
MCHC RBC AUTO-ENTMCNC: 32.8 G/DL (ref 31–36)
MCV RBC AUTO: 94.1 FL (ref 80–100)
PDW BLD-RTO: 14.1 % (ref 12.4–15.4)
PLATELET # BLD: 209 K/UL (ref 135–450)
PMV BLD AUTO: 10.8 FL (ref 5–10.5)
POTASSIUM SERPL-SCNC: 4.7 MMOL/L (ref 3.5–5.1)
PRO-BNP: 85 PG/ML (ref 0–449)
RBC # BLD: 4.71 M/UL (ref 4.2–5.9)
SODIUM BLD-SCNC: 143 MMOL/L (ref 136–145)
TOTAL PROTEIN: 7.3 G/DL (ref 6.4–8.2)
TRIGL SERPL-MCNC: 107 MG/DL (ref 0–150)
VITAMIN D 25-HYDROXY: 34.6 NG/ML
VLDLC SERPL CALC-MCNC: 21 MG/DL
WBC # BLD: 8.3 K/UL (ref 4–11)

## 2021-10-25 PROCEDURE — G8484 FLU IMMUNIZE NO ADMIN: HCPCS | Performed by: INTERNAL MEDICINE

## 2021-10-25 PROCEDURE — 83880 ASSAY OF NATRIURETIC PEPTIDE: CPT

## 2021-10-25 PROCEDURE — 1123F ACP DISCUSS/DSCN MKR DOCD: CPT | Performed by: INTERNAL MEDICINE

## 2021-10-25 PROCEDURE — G8417 CALC BMI ABV UP PARAM F/U: HCPCS | Performed by: INTERNAL MEDICINE

## 2021-10-25 PROCEDURE — 80053 COMPREHEN METABOLIC PANEL: CPT

## 2021-10-25 PROCEDURE — 1036F TOBACCO NON-USER: CPT | Performed by: INTERNAL MEDICINE

## 2021-10-25 PROCEDURE — 82306 VITAMIN D 25 HYDROXY: CPT

## 2021-10-25 PROCEDURE — 99214 OFFICE O/P EST MOD 30 MIN: CPT | Performed by: INTERNAL MEDICINE

## 2021-10-25 PROCEDURE — 85027 COMPLETE CBC AUTOMATED: CPT

## 2021-10-25 PROCEDURE — 36415 COLL VENOUS BLD VENIPUNCTURE: CPT

## 2021-10-25 PROCEDURE — 4040F PNEUMOC VAC/ADMIN/RCVD: CPT | Performed by: INTERNAL MEDICINE

## 2021-10-25 PROCEDURE — G8427 DOCREV CUR MEDS BY ELIG CLIN: HCPCS | Performed by: INTERNAL MEDICINE

## 2021-10-25 PROCEDURE — 80061 LIPID PANEL: CPT

## 2021-10-25 RX ORDER — CLOPIDOGREL BISULFATE 75 MG/1
75 TABLET ORAL DAILY
COMMUNITY
End: 2021-11-23 | Stop reason: ALTCHOICE

## 2021-10-25 NOTE — LETTER
[address] [facility] [address] [address]  Phone: [phone]  Fax: [phone]    Samuel Kaur MD         October 25, 2021     Patient: Edward Salcedo   YOB: 1945   Date of Visit: 10/25/2021       To Whom It May Concern: It is my medical opinion that Magdy Monreal requires a disability parking placard for the following reasons:  He has a cardiac condition to the extent that functional limitations are classified in severity as class III, according to standards accepted by the American Heart Association. Duration of need: 5 years    If you have any questions or concerns, please don't hesitate to call.     Sincerely,        Samuel Kaur MD

## 2021-10-25 NOTE — PROGRESS NOTES
[address]  Advanced CHF/Pulmonary Hypertension   Cardiac Evaluation      Sommer Lawrence  YOB: 1945    Date of Visit:  10/25/21    Chief Complaint   Patient presents with    Congestive Heart Failure      History of Present Illness:  Sommer Lawrence is a 64 y.o. male who presents as a referral from my partner Dr. Reinier Gonzalez for consultation and management of heart failure symptoms. His history includes HTN, HLD, RBBB, DM. He also was diagnosed with severe AUDIE in 2019 and uses a C-pap.  7/25/2019, he was seen in [facility] ER for fatigue and right sided weakness; he was diagnosed with a TIA and discharged on Plavix & asa. MCOT 8/2019 showed NSR with frequent PVC's (25% burden). 10/14/2019, he had ILR insertion for cryptogenic stroke. An LHC (Dr. Jaime Rogers) same day showed very mild CAD to the LAD. On 9/22/2020, he ad a PVC ablation per Dr. Reinier Gonzalez. He has been diagnosed with Eczema. Today, he is here for regular follow up with his wife. He states he is fair and has weakness with walking. He is out of breath with walking. He was interested in cardiac rehab but he will not qualify with EF of 55% . His wife was positive for COVID about 4-5 weeks ago. She went to the ER and did not receive treatment. He is interested in getting a Booster. Discussed he can get the booster at [facility] or [facility]. He requested car Placard x 2 for SOB.           No Known Allergies  Current Outpatient Medications   Medication Sig Dispense Refill    clopidogrel (PLAVIX) 75 MG tablet Take 75 mg by mouth daily      Ascorbic Acid (VITAMIN C PO) Take by mouth      hydroCHLOROthiazide (HYDRODIURIL) 25 MG tablet Take 1 tablet by mouth See Admin Instructions Take 1 tablet 5 days weekly, Monday, Wednesday, Thursday, Friday and sunday 60 tablet 3    pravastatin (PRAVACHOL) 80 MG tablet Take 1 tablet by mouth every evening 90 tablet 3    losartan (COZAAR) 100 MG tablet Take 1 tablet by mouth daily 90 tablet 3    ezetimibe (ZETIA) 10 MG tablet Take 1 tablet by mouth daily 90 tablet 3    apixaban (ELIQUIS) 5 MG TABS tablet Take 1 tablet by mouth 2 times daily 180 tablet 3    tamsulosin (FLOMAX) 0.4 MG capsule Take 1 capsule by mouth daily 90 capsule 3    amLODIPine (NORVASC) 5 MG tablet Take 5 mg by mouth daily      omeprazole (PRILOSEC) 40 MG delayed release capsule Take 1 capsule by mouth daily 90 capsule 3    mometasone (NASONEX) 50 MCG/ACT nasal spray 2 sprays by Each Nostril route daily (Patient taking differently: 2 sprays by Each Nostril route daily as needed ) 3 Inhaler 3    b complex vitamins capsule Take 1 capsule by mouth daily      solifenacin (VESICARE) 10 MG tablet Take 10 mg by mouth daily       aspirin 81 MG tablet Take 81 mg by mouth daily      Cholecalciferol (VITAMIN D) 2000 UNITS CAPS capsule Take 1 capsule by mouth daily       Glucosamine HCl-MSM (GLUCOSAMINE-MSM PO) Take 1 tablet by mouth 2 times daily       blood glucose test strips (GLUCOSE METER TEST) strip 1 each by In Vitro route daily 300 each 4    Lancets MISC 1 each by Does not apply route daily 300 each 4     No current facility-administered medications for this visit.        Past Medical History:   Diagnosis Date    BPH     Colon polyps     DDD (degenerative disc disease), lumbar     Diverticulosis     Gallstone pancreatitis 09/05/2017    GERD (gastroesophageal reflux disease)     Hyperlipidemia     Hypertension     Impaired fasting blood sugar     Kidney stones 11/10    Lumbar spondylosis     Mild CAD 10/14/2019    [facility]    Mild CAD 11/13/2019    OAB (overactive bladder)     AUDIE (obstructive sleep apnea) 6/4/2013    Osteoarthritis     Osteoarthritis of right knee     Pneumonia     pneumonia    RBBB 12/19/2016    Renal cyst     Retinal vasculitis     Retinal vasculitis     Right hemiparesis ([facility] [address] 7/25/2019    Right ureteral stone     Type 2 diabetes mellitus without complication, without long-term current use of insulin ([facility] [address]     diet controlled    Type 2 diabetes mellitus without complication, without long-term current use of insulin ([address]      Past Surgical History:   Procedure Laterality Date    APPENDECTOMY      BONE RESECTION Left     bone spur removal left elbow    CARDIAC CATHETERIZATION  3/23/2012    CATARACT REMOVAL WITH IMPLANT Left 2017    CATARACT REMOVAL WITH IMPLANT Right 10/2017    CHOLECYSTECTOMY, LAPAROSCOPIC  2017    COLONOSCOPY  2008    multiple    COLONOSCOPY N/A 1/15/2020    COLONOSCOPY POLYPECTOMY SNARE/COLD BIOPSY performed by Judy Begum MD at [address] [place] [address]  2014    Retrograde Pyelogram, stent, Dr. Jerardo Root N/A 2018    CYSTOSCOPY, LEFT RETROGRADE PYELOGRAM, LEFT STENT PLACEMENT performed by Benedicto Clay MD at [address] [place] [address] 2019    CYSTOSCOPY LEFT URETEROSCOPY HOLMIUM LASER LITHOTRIPSY, STONE MANIPULATION WITH LEFT STENT EXCHANGE performed by Benedicto Clay MD at [place] [address]  3/28/2012    laparoscopic    TONSILLECTOMY      VENTRICULAR ABLATION SURGERY  2020     Family History   Problem Relation Age of Onset    Mental Illness Mother         Alzheimers    Cancer Father         Leukemia    Diabetes Brother     Sleep Apnea Brother     Other Sister         pyloric valve repacement     Social History     Socioeconomic History    Marital status:      Spouse name: James Morrison Number of children: 3    Years of education: 12    Highest education level: Not on file   Occupational History    Not on file   Tobacco Use    Smoking status: Former Smoker     Packs/day: 1.00     Years: 16.00     Pack years: 16.00     Types: Cigarettes     Quit date: 1977     Years since quittin.4    Smokeless tobacco: Never Used    Tobacco comment: smoked for 12 ys / smoked up to 1 ppd   Vaping Use    Vaping Use: Never used   Substance and Sexual Activity    Alcohol use: Yes     Alcohol/week: 4.0 standard drinks     Types: 1 Glasses of wine, 1 Cans of beer, 2 Standard drinks or equivalent per week     Comment: ONCE A WEEK    Drug use: Never    Sexual activity: Yes     Partners: Female   Other Topics Concern    Not on file   Social History Narrative    Not on file     Social Determinants of Health     Financial Resource Strain:     Difficulty of Paying Living Expenses:    Food Insecurity:     Worried About Running Out of Food in the Last Year:     Ran Out of Food in the Last Year:    Transportation Needs:     Lack of Transportation (Medical):  Lack of Transportation (Non-Medical):    Physical Activity:     Days of Exercise per Week:     Minutes of Exercise per Session:    Stress:     Feeling of Stress :    Social Connections:     Frequency of Communication with Friends and Family:     Frequency of Social Gatherings with Friends and Family:     Attends [employer] Services:     Active Member of Clubs or Organizations:     Attends Club or Organization Meetings:     Marital Status:    Intimate Partner Violence:     Fear of Current or Ex-Partner:     Emotionally Abused:     Physically Abused:     Sexually Abused:        Review of Systems:   · Constitutional: there has been no unanticipated weight loss. There's been no change in energy level, sleep pattern, or activity level. · Eyes: No visual changes or diplopia. No scleral icterus. · ENT: No Headaches, hearing loss or vertigo. No mouth sores or sore throat. · Cardiovascular: Reviewed in HPI  · Respiratory: No cough or wheezing, no sputum production. No hematemesis. · Gastrointestinal: No abdominal pain, appetite loss, blood in stools. No change in bowel or bladder habits. · Genitourinary: No dysuria, trouble voiding, or hematuria. · Musculoskeletal:  No gait disturbance, weakness or joint complaints. · Integumentary: No rash or pruritis.   · Neurological: No headache, diplopia, change in muscle strength, numbness or tingling. No change in gait, balance, coordination, mood, affect, memory, mentation, behavior. · Psychiatric: No anxiety, no depression. · Endocrine: No malaise, fatigue or temperature intolerance. No excessive thirst, fluid intake, or urination. No tremor. · Hematologic/Lymphatic: No abnormal bruising or bleeding, blood clots or swollen lymph nodes. · Allergic/Immunologic: No nasal congestion or hives. Physical Examination:    Vitals:    10/25/21 1110   BP: (!) 100/50   Pulse: 67   SpO2: 94%   Weight: 227 lb (103 kg)   Height: 5' 8\" (1.727 m)     Body mass index is 34.52 kg/m². Wt Readings from Last 3 Encounters:   10/25/21 227 lb (103 kg)   08/03/21 228 lb (103.4 kg)   05/20/21 224 lb 9.6 oz (101.9 kg)     BP Readings from Last 3 Encounters:   10/25/21 (!) 100/50   08/03/21 113/77   05/20/21 101/70     Constitutional and General Appearance:  Appears stated age. WD/WN in NAD, mildly obese  HEENT:  NC/AT  NUBIA  No problems with hearing  Skin:  Warm, dry  Respiratory:  · Normal excursion and expansion without use of accessory muscles  · Resp Auscultation: Normal breath sounds without dullness  Cardiovascular:  · The apical impulses not displaced  · Heart tones are crisp and normal  · Cervical veins are not engorged  · The carotid upstroke is normal in amplitude and contour without delay or bruit  · JVP less than 8 cm H2O  RRR with nl S1 and S2 without m,r,g  · Peripheral pulses are symmetrical and full  · There is no clubbing, cyanosis of the extremities.   · No edema  · Femoral Arteries: 2+ and equal  · Pedal Pulses: 2+ and equal   Neck:  · No thyromegaly  Abdomen:  · No masses or tenderness  · Liver/Spleen: No Abnormalities Noted  Neurological/Psychiatric:  · Alert and oriented in all spheres  · Moves all extremities well  · Exhibits normal gait balance and coordination  · No abnormalities of mood, affect, memory, mentation, or behavior are noted      Echo 21  Technically difficult study due to body habitus.   -Abnormal arrhythmia noted.   -Left ventricular cavity size is normal.   -Ejection fraction is visually estimated to be 55%. -There is abnormal septal motion.   -Wall motion abnormalities difficult to determine due to abnormal   arrhythmia.   -Grade I diastolic dysfunction with normal LV filling pressures. E/e'=   14.9.   -Left atrium is dilated. -Right atrium is dilated. -The aortic valve appears sclerotic but opens well. -Mitral annular calcification is present. -Mild mitral regurgitation.   -Trivial tricuspid regurgitation. RVSP is estimated to be 21-24 mmHG. -IVC not well visualized. Procedure performed: PVC Ablation per Dr. Saravia Blocker 2020  Indications for procedure:    Alonso Duvall is 66 y.o. male with frequent symptomatic PVC  · Electrophysiological study with ablation of left  Outflow PVCs  · Attempted induction of arrhythmia after IV drug infusion. · Three-dimensional electroanatomic mapping of the left ventricle   · Intracardiac ultrasound   · Left atrial recording and mapping via coronary sinus     EC20 Sinus Rhythm with frequent PVC's  QTcH 441     MCOT:  -19  Sinus rhythm with frequent PVC's  PVC burden 25%  PAC burden 3%  High   Low HR 42  Average HR 69     Echo: 19  Summary   -Arrhythmia noted.   -Normal left ventricle size and systolic function with an estimated ejection   fraction of 55%. There is concentric left ventricular hypertrophy. Grade I   diastolic dysfunction with elevated LV filling pressures.   -Mitral annular calcification is present.   -Mild mitral regurgitation.   -Aortic valve appears sclerotic but opens adequately.   -Mild tricuspid regurgitation with PASP of 34 mmHg     Nuclear stress:  10/4/19  Summary   No EKG evidence for ischemia with lexiscan   Normal LV size and systolic function.   Myocardial perfusion imaging with small area of decreased uptake in savanna    Scribe's attestation: This note was scribed in the presence of Geneva Oliver M.D. by Boston Cespedes RN     The scribe's documentation has been prepared under my direction and personally reviewed by me in its entirety. I confirm that the note above accurately reflects all work, treatment, procedures, and medical decision making performed by me. Time Based Itemization  A total of 30 minutes was spent on today's patient encounter. If applicable, non-patient-facing activities:  ( x)Preparing to see the patient and reviewing records  (x ) Individual interpretation of results  ( ) Discussion or coordination of care with other health care professionals  ( x) Ordering of unique tests, medications, or procedures  ( x) Documentation within the EHR        I appreciate the opportunity of cooperating in the care of this patient.     Geneva Oliver M.D., [facility]

## 2021-10-25 NOTE — PATIENT INSTRUCTIONS
PLAN:  1.  Labs today. CBC, CMP, BNP, Lipids, Vit D.  FASTING  2. Continue same medications.   3.  See Dr. Leandra Kapoor in 6 months

## 2021-10-27 ENCOUNTER — TELEPHONE (OUTPATIENT)
Dept: CARDIOLOGY CLINIC | Age: 76
End: 2021-10-27

## 2021-10-27 NOTE — TELEPHONE ENCOUNTER
----- Message from Jerri Contreras MD sent at 10/26/2021  9:19 PM EDT -----  Please call patient and let him know that his labs look great. No changes.   BERNA

## 2021-10-27 NOTE — TELEPHONE ENCOUNTER
I spoke with pt and relayed lab results per BERNA. Pt verbalized understanding and requested copies. Printed and placed in the mail.

## 2021-11-13 PROCEDURE — 93298 REM INTERROG DEV EVAL SCRMS: CPT | Performed by: INTERNAL MEDICINE

## 2021-11-13 PROCEDURE — G2066 INTER DEVC REMOTE 30D: HCPCS | Performed by: INTERNAL MEDICINE

## 2021-11-15 ENCOUNTER — NURSE ONLY (OUTPATIENT)
Dept: CARDIOLOGY CLINIC | Age: 76
End: 2021-11-15
Payer: MEDICARE

## 2021-11-15 DIAGNOSIS — Z45.09 ENCOUNTER FOR ELECTRONIC ANALYSIS OF REVEAL EVENT RECORDER: ICD-10-CM

## 2021-11-15 DIAGNOSIS — R00.1 BRADYCARDIA: ICD-10-CM

## 2021-11-15 NOTE — PROGRESS NOTES
We received a remote transmission from patient's monitor at home. Remote Linq report shows nocturnal gallito recording. Pt showed no symptoms. EP physician to review. We will continue to monitor remotely.

## 2021-11-19 ENCOUNTER — TELEPHONE (OUTPATIENT)
Dept: PULMONOLOGY | Age: 76
End: 2021-11-19

## 2021-11-23 NOTE — TELEPHONE ENCOUNTER
Mr Camille Anderson stopped by the office for a refill on Eliquis 5mg. Send to [address] [place] [address]. He wants a year refill. Please CALL him when done because he as to drive to [place] to pick it up.    Thanks

## 2021-11-23 NOTE — TELEPHONE ENCOUNTER
Received refill request for Eliquis     Last OV: 05/20/2021 w/ MXA    Last Labs: 10/25/2021 CMP & CBC    Last Refill: 11/16/2020 #180 w/ 3 refills     Next Appointment: on recall list for appt on 02/14/2022

## 2021-11-23 NOTE — TELEPHONE ENCOUNTER
He should not be taking ASA, plavix, and eliquis. If he is taking all three, please have him stop the plavix.     Wannetta Litten, APRN-CNP

## 2021-11-23 NOTE — TELEPHONE ENCOUNTER
Patient states he is not taking the plavix,(taken off med list). Patient is also asking for the eliquis to be sent to "[facility] electronically. Patient is also wanting a call to be informed that the rx was sent.

## 2021-11-23 NOTE — TELEPHONE ENCOUNTER
[facility] brought me a printed script for pt. I spoke with [facility] and she stated that pt wanted script e scribed to [facility]. I sent new script to the pharmacy.

## 2021-12-07 PROBLEM — I50.32 CHRONIC DIASTOLIC HEART FAILURE (HCC): Chronic | Status: ACTIVE | Noted: 2020-07-08

## 2021-12-18 PROCEDURE — 93298 REM INTERROG DEV EVAL SCRMS: CPT | Performed by: INTERNAL MEDICINE

## 2021-12-18 PROCEDURE — G2066 INTER DEVC REMOTE 30D: HCPCS | Performed by: INTERNAL MEDICINE

## 2021-12-20 ENCOUNTER — NURSE ONLY (OUTPATIENT)
Dept: CARDIOLOGY CLINIC | Age: 76
End: 2021-12-20
Payer: MEDICARE

## 2021-12-20 DIAGNOSIS — R00.1 BRADYCARDIA: ICD-10-CM

## 2021-12-20 DIAGNOSIS — Z45.09 ENCOUNTER FOR ELECTRONIC ANALYSIS OF REVEAL EVENT RECORDER: ICD-10-CM

## 2021-12-20 NOTE — PROGRESS NOTES
We received a remote transmission from patient's monitor at home. Remote Linq report shows SR with ectopy and gallito recording. AF is not real.. EP physician to review. We will continue to monitor remotely.

## 2022-01-20 NOTE — PROGRESS NOTES
We received a remote transmission from patient's monitor at home. Remote Linq report shows gallito recordings. Pt showed no symptoms. EP physician to review. We will continue to monitor remotely.

## 2022-01-24 ENCOUNTER — NURSE ONLY (OUTPATIENT)
Dept: CARDIOLOGY CLINIC | Age: 77
End: 2022-01-24
Payer: MEDICARE

## 2022-01-24 DIAGNOSIS — Z45.09 ENCOUNTER FOR ELECTRONIC ANALYSIS OF REVEAL EVENT RECORDER: ICD-10-CM

## 2022-01-24 DIAGNOSIS — R00.1 BRADYCARDIA: ICD-10-CM

## 2022-01-24 PROCEDURE — G2066 INTER DEVC REMOTE 30D: HCPCS | Performed by: INTERNAL MEDICINE

## 2022-01-24 PROCEDURE — 93298 REM INTERROG DEV EVAL SCRMS: CPT | Performed by: INTERNAL MEDICINE

## 2022-02-26 PROCEDURE — G2066 INTER DEVC REMOTE 30D: HCPCS | Performed by: INTERNAL MEDICINE

## 2022-02-26 PROCEDURE — 93298 REM INTERROG DEV EVAL SCRMS: CPT | Performed by: INTERNAL MEDICINE

## 2022-02-28 ENCOUNTER — NURSE ONLY (OUTPATIENT)
Dept: CARDIOLOGY CLINIC | Age: 77
End: 2022-02-28
Payer: MEDICARE

## 2022-02-28 DIAGNOSIS — Z45.09 ENCOUNTER FOR ELECTRONIC ANALYSIS OF REVEAL EVENT RECORDER: ICD-10-CM

## 2022-02-28 DIAGNOSIS — R00.1 BRADYCARDIA: ICD-10-CM

## 2022-04-02 PROCEDURE — 93298 REM INTERROG DEV EVAL SCRMS: CPT | Performed by: INTERNAL MEDICINE

## 2022-04-02 PROCEDURE — G2066 INTER DEVC REMOTE 30D: HCPCS | Performed by: INTERNAL MEDICINE

## 2022-04-04 ENCOUNTER — NURSE ONLY (OUTPATIENT)
Dept: CARDIOLOGY CLINIC | Age: 77
End: 2022-04-04
Payer: MEDICARE

## 2022-04-04 DIAGNOSIS — G45.9 TIA (TRANSIENT ISCHEMIC ATTACK): ICD-10-CM

## 2022-04-04 DIAGNOSIS — Z45.09 ENCOUNTER FOR ELECTRONIC ANALYSIS OF REVEAL EVENT RECORDER: ICD-10-CM

## 2022-04-04 NOTE — PROGRESS NOTES
We received a remote transmission from patient's monitor at home. Remote Linq report shows 1 AF recording. The other 2 are not real. Pt is on Eliquis. EP physician to review. We will continue to monitor remotely.

## 2022-04-10 NOTE — RESULT ENCOUNTER NOTE
Reviewed. Continue monitoring.     Boy Duron MD [facility]

## 2022-04-11 ENCOUNTER — OFFICE VISIT (OUTPATIENT)
Dept: PULMONOLOGY | Age: 77
End: 2022-04-11
Payer: MEDICARE

## 2022-04-11 VITALS
SYSTOLIC BLOOD PRESSURE: 100 MMHG | HEART RATE: 67 BPM | OXYGEN SATURATION: 97 % | TEMPERATURE: 97.3 F | WEIGHT: 236 LBS | DIASTOLIC BLOOD PRESSURE: 60 MMHG | BODY MASS INDEX: 35.36 KG/M2

## 2022-04-11 DIAGNOSIS — E66.9 OBESITY (BMI 30-39.9): Chronic | ICD-10-CM

## 2022-04-11 DIAGNOSIS — I10 BENIGN ESSENTIAL HTN: Chronic | ICD-10-CM

## 2022-04-11 DIAGNOSIS — E11.9 TYPE 2 DIABETES MELLITUS WITHOUT COMPLICATION, WITHOUT LONG-TERM CURRENT USE OF INSULIN (HCC): Chronic | ICD-10-CM

## 2022-04-11 DIAGNOSIS — G47.33 OBSTRUCTIVE SLEEP APNEA (ADULT) (PEDIATRIC): Chronic | ICD-10-CM

## 2022-04-11 PROCEDURE — 4040F PNEUMOC VAC/ADMIN/RCVD: CPT | Performed by: INTERNAL MEDICINE

## 2022-04-11 PROCEDURE — G8417 CALC BMI ABV UP PARAM F/U: HCPCS | Performed by: INTERNAL MEDICINE

## 2022-04-11 PROCEDURE — 1036F TOBACCO NON-USER: CPT | Performed by: INTERNAL MEDICINE

## 2022-04-11 PROCEDURE — 99214 OFFICE O/P EST MOD 30 MIN: CPT | Performed by: INTERNAL MEDICINE

## 2022-04-11 PROCEDURE — 1123F ACP DISCUSS/DSCN MKR DOCD: CPT | Performed by: INTERNAL MEDICINE

## 2022-04-11 PROCEDURE — G8427 DOCREV CUR MEDS BY ELIG CLIN: HCPCS | Performed by: INTERNAL MEDICINE

## 2022-04-11 ASSESSMENT — SLEEP AND FATIGUE QUESTIONNAIRES
HOW LIKELY ARE YOU TO NOD OFF OR FALL ASLEEP WHEN YOU ARE A PASSENGER IN A CAR FOR AN HOUR WITHOUT A BREAK: 0
HOW LIKELY ARE YOU TO NOD OFF OR FALL ASLEEP WHILE SITTING AND TALKING TO SOMEONE: 0
HOW LIKELY ARE YOU TO NOD OFF OR FALL ASLEEP WHILE SITTING QUIETLY AFTER LUNCH WITHOUT ALCOHOL: 1
HOW LIKELY ARE YOU TO NOD OFF OR FALL ASLEEP IN A CAR, WHILE STOPPED FOR A FEW MINUTES IN TRAFFIC: 0
HOW LIKELY ARE YOU TO NOD OFF OR FALL ASLEEP WHILE SITTING AND READING: 1
HOW LIKELY ARE YOU TO NOD OFF OR FALL ASLEEP WHILE WATCHING TV: 2
HOW LIKELY ARE YOU TO NOD OFF OR FALL ASLEEP WHILE LYING DOWN TO REST IN THE AFTERNOON WHEN CIRCUMSTANCES PERMIT: 3
HOW LIKELY ARE YOU TO NOD OFF OR FALL ASLEEP WHILE SITTING INACTIVE IN A PUBLIC PLACE: 0
ESS TOTAL SCORE: 7

## 2022-04-11 NOTE — ASSESSMENT & PLAN NOTE
Chronic-not Stable: Reviewed and analyzed results of physiologic download from patient's machine and reviewed with patient. Supplies and parts as needed for his machine. These are medically necessary. Limit caffeine use after 3pm. Based on the analyzed data will continue with current settings. Wanted patient again to register his machine for the recall. Now that he is got a mask that stays on we will follow the data over the next 6 months make adjustments as needed.

## 2022-04-11 NOTE — PROGRESS NOTES
Jayda Carl MD, [facility], [facility]  Tete Schultz CNP [address]  [address] [facility], [address] [phone]   [facility] [address] Leonia Osgood. [address] [facility]. Arvind Madsen 37 [phone] [address] [facility] SLEEP MEDICINE  [address] [place] [id number] [phone]      Assessment/Plan:      1. Obstructive sleep apnea (adult) (pediatric)  Assessment & Plan:  Chronic-not Stable: Reviewed and analyzed results of physiologic download from patient's machine and reviewed with patient. Supplies and parts as needed for his machine. These are medically necessary. Limit caffeine use after 3pm. Based on the analyzed data will continue with current settings. Wanted patient again to register his machine for the recall. Now that he is got a mask that stays on we will follow the data over the next 6 months make adjustments as needed. 2. Benign essential HTN  Assessment & Plan:  Chronic- Stable. Discussed the importance of treating sleep apnea as part of the management of this disorder. Cont any meds per PCP and other physicians. 3. Type 2 diabetes mellitus without complication, without long-term current use of insulin (HCC)  Assessment & Plan:  Chronic- Stable. Discussed the importance of treating sleep apnea as part of the management of this disorder. Cont any meds per PCP and other physicians. 4. Obesity (BMI 30-39. 9)  Assessment & Plan:  Chronic-not stable:  Discussed importance of treating obstructive sleep apnea and getting sufficient sleep to assist with weight control. Encouraged him to work on weight loss through diet and exercise. Recommended DASH or Mediterranean diets. Reviewed, analyzed, and documented physiologic data from patient's PAP machine. This information was analyzed to assess complexity and medical decision making in regards to further testing and management.     Diagnoses of Obstructive sleep apnea (adult) (pediatric), Benign essential HTN, Type 2 diabetes mellitus without complication, without long-term current use of insulin ([facility] [address] and Obesity (BMI 30-39. 9) were pertinent to this visit. The chronic medical conditions listed are directly related to the primary diagnosis listed above. The management of the primary diagnosis affects the secondary diagnosis and vice versa. Subjective:   Subjective   Patient ID: Darshan Alcaraz is a 64 y.o. male. Chief Complaint   Patient presents with    Sleep Apnea       HPI:  Machine Modem/Download Info:  Compliance (hours/night): 0.7 hrs/night  % of nights >= 4 hrs: 8.9 %  Download AHI (/hour): 4.3 /HR  Average IPAP Pressure: 14.5 cmH2O  Average EPAP Pressure: 11 cmH2O         AUTO BIPAP - Settings (Srinivas)  IPAP Max: 25 cmH2O  EPAP Min: 10 cmH2O  Pressure Support Min: 3  Pressure Support Max: 7             Comfort Settings  Humidity Level (0-8): 5  Flex/EPR (0-3): 3       Patient does not recall if he registered the machine for the recall. He was off his machine for many months because the straps would hold and placed in Velcro kept coming off. He states he called his equipment company for many months to try to get a replacement head strap and finally got it about a week ago. He has since been back on his machine and feels he is sleeping well and the pressure feels good. Limited data that was able to be reviewed does show sleep apnea is controlled on auto bilevel.     [facility]    [place] - Total score: 7    Social History     Socioeconomic History    Marital status:      Spouse name: Norma Diaz Number of children: 3    Years of education: 12    Highest education level: Not on file   Occupational History    Not on file   Tobacco Use    Smoking status: Former Smoker     Packs/day: 1.00     Years: 16.00     Pack years: 16.00     Types: Cigarettes     Quit date: 1977     Years since quittin.9    Smokeless tobacco: Never Used  Tobacco comment: smoked for 12 ys / smoked up to 1 ppd   Vaping Use    Vaping Use: Never used   Substance and Sexual Activity    Alcohol use: Yes     Alcohol/week: 4.0 standard drinks     Types: 1 Glasses of wine, 1 Cans of beer, 2 Standard drinks or equivalent per week     Comment: ONCE A WEEK    Drug use: Never    Sexual activity: Yes     Partners: Female   Other Topics Concern    Not on file   Social History Narrative    Not on file     Social Determinants of Health     Financial Resource Strain:     Difficulty of Paying Living Expenses: Not on file   Food Insecurity:     Worried About Running Out of Food in the Last Year: Not on file    Barbie of Food in the Last Year: Not on file   Transportation Needs:     Lack of Transportation (Medical): Not on file    Lack of Transportation (Non-Medical):  Not on file   Physical Activity:     Days of Exercise per Week: Not on file    Minutes of Exercise per Session: Not on file   Stress:     Feeling of Stress : Not on file   Social Connections:     Frequency of Communication with Friends and Family: Not on file    Frequency of Social Gatherings with Friends and Family: Not on file    Attends Gnosticism Services: Not on file    Active Member of [address] [place] [address] or Organizations: Not on file    Attends Club or Organization Meetings: Not on file    Marital Status: Not on file   Intimate Partner Violence:     Fear of Current or Ex-Partner: Not on file    Emotionally Abused: Not on file    Physically Abused: Not on file    Sexually Abused: Not on file   Housing Stability:     Unable to Pay for Housing in the Last Year: Not on file    Number of [place] in the Last Year: Not on file    Unstable Housing in the Last Year: Not on file       Current Outpatient Medications   Medication Instructions    amLODIPine (NORVASC) 5 mg, Oral, DAILY    apixaban (ELIQUIS) 5 mg, Oral, 2 TIMES DAILY    Ascorbic Acid (VITAMIN C PO) Oral    aspirin 81 mg, Oral, DAILY    b complex vitamins capsule 1 capsule, Oral, DAILY    blood glucose test strips (GLUCOSE METER TEST) strip 1 each, In Vitro, DAILY    Cholecalciferol (VITAMIN D) 2000 UNITS CAPS capsule 1 capsule, Oral, DAILY    ezetimibe (ZETIA) 10 mg, Oral, DAILY    Glucosamine HCl-MSM (GLUCOSAMINE-MSM PO) 1 tablet, Oral, 2 TIMES DAILY    hydroCHLOROthiazide (HYDRODIURIL) 25 mg, Oral, SEE ADMIN INSTRUCTIONS, Take 1 tablet 5 days weekly, Monday, Wednesday, Thursday, Friday and sunday    Lancets MISC 1 each, Does not apply, DAILY    losartan (COZAAR) 100 mg, Oral, DAILY    mometasone (NASONEX) 50 MCG/ACT nasal spray 2 sprays, Each Nostril, DAILY    omeprazole (PRILOSEC) 40 mg, Oral, DAILY    pravastatin (PRAVACHOL) 80 mg, Oral, EVERY EVENING    solifenacin (VESICARE) 10 mg, Oral, DAILY    tamsulosin (FLOMAX) 0.4 mg, Oral, DAILY          Vitals:  Weight BMI   Wt Readings from Last 3 Encounters:   04/11/22 236 lb (107 kg)   12/07/21 230 lb 9.6 oz (104.6 kg)   10/25/21 227 lb (103 kg)    Body mass index is 35.36 kg/m².      BP HR SaO2   BP Readings from Last 3 Encounters:   04/11/22 100/60   12/07/21 90/70   10/25/21 (!) 100/50    Pulse Readings from Last 3 Encounters:   04/11/22 67   12/07/21 79   10/25/21 67    SpO2 Readings from Last 3 Encounters:   04/11/22 97%   12/07/21 97%   10/25/21 94%        Electronically signed by Edwige Javier MD on 4/11/2022 at 2:13 PM

## 2022-04-21 ENCOUNTER — TELEPHONE (OUTPATIENT)
Dept: CARDIOLOGY CLINIC | Age: 77
End: 2022-04-21

## 2022-04-21 RX ORDER — HYDROCHLOROTHIAZIDE 25 MG/1
TABLET ORAL
Qty: 90 TABLET | Refills: 1 | Status: SHIPPED | OUTPATIENT
Start: 2022-04-21

## 2022-04-21 NOTE — TELEPHONE ENCOUNTER
Spoke to pt. Confirmed dose in chart and with pt. Last OV 10/2021. States his sister in law is dying out of state in [place] and they are flying out either Friday or Saturday. Tried to make next appt for pt and he preferred to make it when he gets back from the trip. Stated he will call the office for next appt. Medication refilled.

## 2022-04-21 NOTE — TELEPHONE ENCOUNTER
Med Refill    Pt called stating he has emergency and has to go to [place] because someone is dying there and would like to know if [facility] will fill his RX for the  hydroCHLOROthiazide (HYDRODIURIL) 25 MG tablet  As soon as possible.     Due to the emergency the pt has to cx appt for Monday, with EBRNA     hydroCHLOROthiazide (HYDRODIURIL) 25 MG tablet   25 mg  60 tablet  Take 1 tablet by mouth See Admin Instructions Take 1 tablet 5 days weekly, Monday, Wednesday, Thursday, Friday and sunday    ISIDORO GRAY [address] [facility] [place], [place] - [address] [facility] [phone] Ryan Bradford [phone]   [address] [place] [address] [address]   Phone:  [phone]  Fax:  [phone]

## 2022-05-07 PROCEDURE — G2066 INTER DEVC REMOTE 30D: HCPCS | Performed by: INTERNAL MEDICINE

## 2022-05-07 PROCEDURE — 93298 REM INTERROG DEV EVAL SCRMS: CPT | Performed by: INTERNAL MEDICINE

## 2022-05-09 ENCOUNTER — NURSE ONLY (OUTPATIENT)
Dept: CARDIOLOGY CLINIC | Age: 77
End: 2022-05-09
Payer: MEDICARE

## 2022-05-09 DIAGNOSIS — R00.1 BRADYCARDIA: ICD-10-CM

## 2022-05-09 DIAGNOSIS — Z45.09 ENCOUNTER FOR ELECTRONIC ANALYSIS OF REVEAL EVENT RECORDER: ICD-10-CM

## 2022-05-09 NOTE — PROGRESS NOTES
We received a remote transmission from patient's monitor at home. Remote Linq report shows gallito recording and known AF. Pt is on Eliquis. EP physician to review. We will continue to monitor remotely.

## 2022-05-11 NOTE — PROGRESS NOTES
[facility]  Advanced CHF/Pulmonary Hypertension   Cardiac Evaluation      Doyle Beckman  YOB: 1945    Date of Visit:  5/13/22    Chief Complaint   Patient presents with    Congestive Heart Failure      History of Present Illness:  Doyle Beckman is a 64 y.o. male who presents as a referral from my partner Dr. Abril Diana for consultation and management of heart failure symptoms. His history includes HTN, HLD, RBBB, DM. He also was diagnosed with severe AUDIE in 2019 and uses a C-pap.  7/25/2019, he was seen in [facility] ER for fatigue and right sided weakness; he was diagnosed with a TIA and discharged on Plavix & asa. MCOT 8/2019 showed NSR with frequent PVC's (25% burden). 10/14/2019, he had ILR insertion for cryptogenic stroke. An LHC (Dr. Rosalia Painter) same day showed very mild CAD to the LAD. On 9/22/2020, he had a PVC ablation per Dr. Abril Diana. He has been diagnosed with Eczema. Today, he is here for regular follow up. He states he has been feeling afirly well. He denies exertional chest pain, FALCON/PND, palpitations, light-headedness, edema. His wife is with him for the visit.     No Known Allergies  Current Outpatient Medications   Medication Sig Dispense Refill    hydroCHLOROthiazide (HYDRODIURIL) 25 MG tablet Take 1 tablet 5 days weekly on Monday, Wednesday, Thursday, Friday and Sunday 90 tablet 1    apixaban (ELIQUIS) 5 MG TABS tablet Take 1 tablet by mouth 2 times daily 180 tablet 3    Ascorbic Acid (VITAMIN C PO) Take by mouth      pravastatin (PRAVACHOL) 80 MG tablet Take 1 tablet by mouth every evening 90 tablet 3    ezetimibe (ZETIA) 10 MG tablet Take 1 tablet by mouth daily 90 tablet 3    tamsulosin (FLOMAX) 0.4 MG capsule Take 1 capsule by mouth daily 90 capsule 3    amLODIPine (NORVASC) 5 MG tablet Take 5 mg by mouth daily      omeprazole (PRILOSEC) 40 MG delayed release capsule Take 1 capsule by mouth daily 90 capsule 3    mometasone (NASONEX) 50 MCG/ACT nasal spray 2 sprays by Each Nostril route daily (Patient taking differently: 2 sprays by Each Nostril route daily as needed ) 3 Inhaler 3    b complex vitamins capsule Take 1 capsule by mouth daily      solifenacin (VESICARE) 10 MG tablet Take 10 mg by mouth daily       aspirin 81 MG tablet Take 81 mg by mouth daily      Cholecalciferol (VITAMIN D) 2000 UNITS CAPS capsule Take 1 capsule by mouth daily       Glucosamine HCl-MSM (GLUCOSAMINE-MSM PO) Take 1 tablet by mouth daily       losartan (COZAAR) 100 MG tablet Take 1 tablet by mouth daily 90 tablet 3    blood glucose test strips (GLUCOSE METER TEST) strip 1 each by In Vitro route daily 300 each 4    Lancets MISC 1 each by Does not apply route daily 300 each 4     No current facility-administered medications for this visit.        Past Medical History:   Diagnosis Date    BPH     Colon polyps     DDD (degenerative disc disease), lumbar     Diverticulosis     Gallstone pancreatitis 09/05/2017    GERD (gastroesophageal reflux disease)     Hyperlipidemia     Hypertension     Impaired fasting blood sugar     Kidney stones 11/10    Lumbar spondylosis     Mild CAD 10/14/2019    Dr. Carrillo Stewart, [facility]    Mild CAD 11/13/2019    OAB (overactive bladder)     AUDIE (obstructive sleep apnea) 6/4/2013    Osteoarthritis     Osteoarthritis of right knee     Pneumonia     pneumonia    RBBB 12/19/2016    Renal cyst     Retinal vasculitis     Retinal vasculitis     Right hemiparesis ([address] 7/25/2019    Right ureteral stone     Type 2 diabetes mellitus without complication, without long-term current use of insulin (HCC)     diet controlled    Type 2 diabetes mellitus without complication, without long-term current use of insulin ([address]      Past Surgical History:   Procedure Laterality Date    APPENDECTOMY      BONE RESECTION Left     bone spur removal left elbow    CARDIAC CATHETERIZATION  3/23/2012    CATARACT REMOVAL WITH IMPLANT Left 11/01/2017    CATARACT REMOVAL WITH IMPLANT Right 10/2017    CHOLECYSTECTOMY, LAPAROSCOPIC  2017    COLONOSCOPY  2008    multiple    COLONOSCOPY N/A 1/15/2020    COLONOSCOPY POLYPECTOMY SNARE/COLD BIOPSY performed by Donnie Ag MD at [address] [place] [address]  2014    Retrograde Pyelogram, stent, Dr. Preet Russ N/A 2018    CYSTOSCOPY, LEFT RETROGRADE PYELOGRAM, LEFT STENT PLACEMENT performed by Jackie Granda MD at [facility] 2019    CYSTOSCOPY LEFT URETEROSCOPY HOLMIUM LASER LITHOTRIPSY, STONE MANIPULATION WITH LEFT STENT EXCHANGE performed by Jackie Granda MD at [place] [address]  3/28/2012    laparoscopic    TONSILLECTOMY      VENTRICULAR ABLATION SURGERY  2020     Family History   Problem Relation Age of Onset    Mental Illness Mother         Alzheimers    Cancer Father         Leukemia    Diabetes Brother     Sleep Apnea Brother     Other Sister         pyloric valve repacement     Social History     Socioeconomic History    Marital status:      Spouse name: Lokesh Torres Number of children: 3    Years of education: 12    Highest education level: Not on file   Occupational History    Not on file   Tobacco Use    Smoking status: Former Smoker     Packs/day: 1.00     Years: 16.00     Pack years: 16.00     Types: Cigarettes     Quit date: 1977     Years since quittin.0    Smokeless tobacco: Never Used    Tobacco comment: smoked for 12 ys / smoked up to 1 ppd   Vaping Use    Vaping Use: Never used   Substance and Sexual Activity    Alcohol use:  Yes     Alcohol/week: 4.0 standard drinks     Types: 1 Glasses of wine, 1 Cans of beer, 2 Standard drinks or equivalent per week     Comment: ONCE A WEEK    Drug use: Never    Sexual activity: Yes     Partners: Female   Other Topics Concern    Not on file   Social History Narrative    Not on file     Social Determinants of Health Financial Resource Strain:     Difficulty of Paying Living Expenses: Not on file   Food Insecurity:     Worried About Running Out of Food in the Last Year: Not on file    Barbie of Food in the Last Year: Not on file   Transportation Needs:     Lack of Transportation (Medical): Not on file    Lack of Transportation (Non-Medical): Not on file   Physical Activity:     Days of Exercise per Week: Not on file    Minutes of Exercise per Session: Not on file   Stress:     Feeling of Stress : Not on file   Social Connections:     Frequency of Communication with Friends and Family: Not on file    Frequency of Social Gatherings with Friends and Family: Not on file    Attends Adventism Services: Not on file    Active Member of [address] [place] [address] or Organizations: Not on file    Attends Club or Organization Meetings: Not on file    Marital Status: Not on file   Intimate Partner Violence:     Fear of Current or Ex-Partner: Not on file    Emotionally Abused: Not on file    Physically Abused: Not on file    Sexually Abused: Not on file   Housing Stability:     Unable to Pay for Housing in the Last Year: Not on file    Number of [place] in the Last Year: Not on file    Unstable Housing in the Last Year: Not on file       Review of Systems:   · Constitutional: there has been no unanticipated weight loss. There's been no change in energy level, sleep pattern, or activity level. · Eyes: No visual changes or diplopia. No scleral icterus. · ENT: No Headaches, hearing loss or vertigo. No mouth sores or sore throat. · Cardiovascular: Reviewed in HPI  · Respiratory: No cough or wheezing, no sputum production. No hematemesis. · Gastrointestinal: No abdominal pain, appetite loss, blood in stools. No change in bowel or bladder habits. · Genitourinary: No dysuria, trouble voiding, or hematuria. · Musculoskeletal:  No gait disturbance, weakness or joint complaints.   · Integumentary: No rash or pruritis. · Neurological: No headache, diplopia, change in muscle strength, numbness or tingling. No change in gait, balance, coordination, mood, affect, memory, mentation, behavior. · Psychiatric: No anxiety, no depression. · Endocrine: No malaise, fatigue or temperature intolerance. No excessive thirst, fluid intake, or urination. No tremor. · Hematologic/Lymphatic: No abnormal bruising or bleeding, blood clots or swollen lymph nodes. · Allergic/Immunologic: No nasal congestion or hives. Physical Examination:    Vitals:    05/13/22 1407   BP: 102/70   Pulse: 65   SpO2: 94%   Weight: 233 lb (105.7 kg)     Body mass index is 34.91 kg/m². Wt Readings from Last 3 Encounters:   05/13/22 233 lb (105.7 kg)   04/11/22 236 lb (107 kg)   12/07/21 230 lb 9.6 oz (104.6 kg)     BP Readings from Last 3 Encounters:   05/13/22 102/70   04/11/22 100/60   12/07/21 90/70     Constitutional and General Appearance:  Appears stated age. WD/WN in NAD, mildly obese  HEENT:  NC/AT  NUBIA  No problems with hearing  Skin:  Warm, dry  Respiratory:  · Normal excursion and expansion without use of accessory muscles  · Resp Auscultation: Normal breath sounds without dullness  Cardiovascular:  · The apical impulses not displaced  · Heart tones are crisp and normal  · Cervical veins are not engorged  · The carotid upstroke is normal in amplitude and contour without delay or bruit  · JVP less than 8 cm H2O  RRR with nl S1 and S2 without m,r,g  · Peripheral pulses are symmetrical and full  · There is no clubbing, cyanosis of the extremities.   · No edema  · Femoral Arteries: 2+ and equal  · Pedal Pulses: 2+ and equal   Neck:  · No thyromegaly  Abdomen:  · No masses or tenderness  · Liver/Spleen: No Abnormalities Noted  Neurological/Psychiatric:  · Alert and oriented in all spheres  · Moves all extremities well  · Exhibits normal gait balance and coordination  · No abnormalities of mood, affect, memory, mentation, or behavior are the   inferoapical wall with stress and rest consistent with scar in the territory   typical of the RCA, Cx or LAD arteries.   No significant reversible ischemia     Cath: 10/14/19   LM       Normal              LAD     20% mid              Cx        Normal              RCA     Normal, nondominant              LVG     Not performed  [employer] were reviewed including labs from other hospital systems through [facility]. Cardiac testing was reviewed including echos, nuclear scans, cardiac catheterization, including from other hospital systems through [facility]. Assessment:   1. Chronic diastolic heart failure ([address]    2. Benign essential HTN    3. PAF (paroxysmal atrial fibrillation) ([facility] [address]    4. FALCON (dyspnea on exertion)    5. SOB (shortness of breath)    6. Hyperlipidemia, unspecified hyperlipidemia type    7. Vitamin D deficiency         1. Chronic diastolic heart failure: Stable. Compensated by exam.  -Pro-BNP 4/28/21> 216; 10/25/21> 85.    -Continue HCTZ (he is unsure if he is still taking losartan). 2. Premature ventricular contractions, h/o:  No PVC's per most recent EKG. -Asymptomatic prior to ablation per pt. 3. Mild CAD:  Stable, no anginal symptoms  -Very mild by cath 10/2019 (20% in LAD)   - Continues on baby asa & statin. 4. Benign essential HTN: /70   Pulse 65   Wt 233 lb (105.7 kg)   SpO2 94%   BMI 34.91 kg/m²   -Stable. Continue amlodipine (he is unsure if he is still taking losartan). Denies dizziness. 5. Hyperlipidemia: Stable on Pravachol 80mg & Zetia 10mg  -7/3/2020> , , HDL 38, LDL 39.   -4/28/21>  ,  HDL 40, LDL 57,       6. AUDIE: Uses C-pap. He is compliant. 7. PAF:  HR regular & controlled. Continues on Eliquis. He has an ILR with regular interrogations.  Showing some episodes of AF, low HR 54.     8. TIA/cryptogenic stroke (7/2019 & 10/2019): F/w neurology  -Taking baby asa  -Has ILR (10/2019)        PLAN:  1. Check to see if he is on losartan. I would rather he be on that and drop the amlodipine. 2.  Continue other meds  3. We would prefer you take losartan. You can stop amlodipine as long as you are taking losartan. Prescription has been sent to [facility]. 4. Check blood pressure a few times in about a month. Call if elevated. 5. Fasting lab work soon. 6. Dr. Dave Hedrick or Dr. Mitch Rivera. [phone] ( [facility]). Time Based Itemization  A total of 40 minutes was spent on today's patient encounter. If applicable, non-patient-facing activities:  ( x)Preparing to see the patient and reviewing records  (x ) Individual interpretation of results  ( ) Discussion or coordination of care with other health care professionals  ( x) Ordering of unique tests, medications, or procedures  ( x) Documentation within the EHR        I appreciate the opportunity of cooperating in the care of this patient. Liane Bearden M.D., [address] [place] [address] Avenue attestation: This note was scribed in the presence of Dr. Aimee Terrell MD, by Dorian Milan RN. The scribe's documentation has been prepared under my direction and personally reviewed by me in its entirety. I confirm that the note above accurately reflects all work, treatment, procedures, and medical decision making performed by me.

## 2022-05-13 ENCOUNTER — OFFICE VISIT (OUTPATIENT)
Dept: CARDIOLOGY CLINIC | Age: 77
End: 2022-05-13
Payer: MEDICARE

## 2022-05-13 VITALS
DIASTOLIC BLOOD PRESSURE: 70 MMHG | OXYGEN SATURATION: 94 % | HEART RATE: 65 BPM | SYSTOLIC BLOOD PRESSURE: 102 MMHG | WEIGHT: 233 LBS | BODY MASS INDEX: 34.91 KG/M2

## 2022-05-13 DIAGNOSIS — R06.09 DOE (DYSPNEA ON EXERTION): ICD-10-CM

## 2022-05-13 DIAGNOSIS — I50.32 CHRONIC DIASTOLIC HEART FAILURE (HCC): Primary | ICD-10-CM

## 2022-05-13 DIAGNOSIS — E55.9 VITAMIN D DEFICIENCY: ICD-10-CM

## 2022-05-13 DIAGNOSIS — I48.0 PAF (PAROXYSMAL ATRIAL FIBRILLATION) (HCC): ICD-10-CM

## 2022-05-13 DIAGNOSIS — I10 BENIGN ESSENTIAL HTN: ICD-10-CM

## 2022-05-13 DIAGNOSIS — R06.02 SOB (SHORTNESS OF BREATH): ICD-10-CM

## 2022-05-13 DIAGNOSIS — E78.5 HYPERLIPIDEMIA, UNSPECIFIED HYPERLIPIDEMIA TYPE: ICD-10-CM

## 2022-05-13 PROBLEM — N18.30 CHRONIC RENAL DISEASE, STAGE III (HCC): Status: ACTIVE | Noted: 2022-05-13

## 2022-05-13 PROCEDURE — 99215 OFFICE O/P EST HI 40 MIN: CPT | Performed by: INTERNAL MEDICINE

## 2022-05-13 PROCEDURE — 1036F TOBACCO NON-USER: CPT | Performed by: INTERNAL MEDICINE

## 2022-05-13 PROCEDURE — 1123F ACP DISCUSS/DSCN MKR DOCD: CPT | Performed by: INTERNAL MEDICINE

## 2022-05-13 PROCEDURE — G8417 CALC BMI ABV UP PARAM F/U: HCPCS | Performed by: INTERNAL MEDICINE

## 2022-05-13 PROCEDURE — G8427 DOCREV CUR MEDS BY ELIG CLIN: HCPCS | Performed by: INTERNAL MEDICINE

## 2022-05-13 RX ORDER — LOSARTAN POTASSIUM 100 MG/1
100 TABLET ORAL DAILY
Qty: 90 TABLET | Refills: 3 | Status: SHIPPED | OUTPATIENT
Start: 2022-05-13 | End: 2023-05-13

## 2022-05-13 NOTE — PATIENT INSTRUCTIONS
1. We would prefer you take losartan. You can stop amlodipine as long as you are taking losartan. Prescription has been sent to [facility].    2. Check blood pressure a few times in about a month. Call if elevated. 3. Fasting lab work soon. 4. Dr. Eileen Perkins or Dr. Kenneth Rose. [phone] ([facility]).

## 2022-06-06 DIAGNOSIS — R06.09 DOE (DYSPNEA ON EXERTION): ICD-10-CM

## 2022-06-06 DIAGNOSIS — E55.9 VITAMIN D DEFICIENCY: ICD-10-CM

## 2022-06-06 DIAGNOSIS — I50.32 CHRONIC DIASTOLIC HEART FAILURE (HCC): ICD-10-CM

## 2022-06-06 DIAGNOSIS — E78.5 HYPERLIPIDEMIA, UNSPECIFIED HYPERLIPIDEMIA TYPE: ICD-10-CM

## 2022-06-06 LAB
A/G RATIO: 1.6 (ref 1.1–2.2)
ALBUMIN SERPL-MCNC: 3.8 G/DL (ref 3.4–5)
ALP BLD-CCNC: 93 U/L (ref 40–129)
ALT SERPL-CCNC: 18 U/L (ref 10–40)
ANION GAP SERPL CALCULATED.3IONS-SCNC: 16 MMOL/L (ref 3–16)
AST SERPL-CCNC: 16 U/L (ref 15–37)
BASOPHILS ABSOLUTE: 0.1 K/UL (ref 0–0.2)
BASOPHILS RELATIVE PERCENT: 1 %
BILIRUB SERPL-MCNC: 0.5 MG/DL (ref 0–1)
BUN BLDV-MCNC: 21 MG/DL (ref 7–20)
CALCIUM SERPL-MCNC: 9.1 MG/DL (ref 8.3–10.6)
CHLORIDE BLD-SCNC: 105 MMOL/L (ref 99–110)
CHOLESTEROL, TOTAL: 147 MG/DL (ref 0–199)
CO2: 19 MMOL/L (ref 21–32)
CREAT SERPL-MCNC: 1.2 MG/DL (ref 0.8–1.3)
EOSINOPHILS ABSOLUTE: 0.3 K/UL (ref 0–0.6)
EOSINOPHILS RELATIVE PERCENT: 4.9 %
GFR AFRICAN AMERICAN: >60
GFR NON-AFRICAN AMERICAN: 58
GLUCOSE BLD-MCNC: 139 MG/DL (ref 70–99)
HCT VFR BLD CALC: 41.3 % (ref 40.5–52.5)
HDLC SERPL-MCNC: 43 MG/DL (ref 40–60)
HEMOGLOBIN: 13.9 G/DL (ref 13.5–17.5)
LDL CHOLESTEROL CALCULATED: 63 MG/DL
LYMPHOCYTES ABSOLUTE: 1.8 K/UL (ref 1–5.1)
LYMPHOCYTES RELATIVE PERCENT: 29.2 %
MCH RBC QN AUTO: 31.3 PG (ref 26–34)
MCHC RBC AUTO-ENTMCNC: 33.6 G/DL (ref 31–36)
MCV RBC AUTO: 93.1 FL (ref 80–100)
MONOCYTES ABSOLUTE: 0.4 K/UL (ref 0–1.3)
MONOCYTES RELATIVE PERCENT: 6.5 %
NEUTROPHILS ABSOLUTE: 3.5 K/UL (ref 1.7–7.7)
NEUTROPHILS RELATIVE PERCENT: 58.4 %
PDW BLD-RTO: 14 % (ref 12.4–15.4)
PLATELET # BLD: 180 K/UL (ref 135–450)
PMV BLD AUTO: 10.2 FL (ref 5–10.5)
POTASSIUM SERPL-SCNC: 3.9 MMOL/L (ref 3.5–5.1)
PRO-BNP: 248 PG/ML (ref 0–449)
RBC # BLD: 4.43 M/UL (ref 4.2–5.9)
SODIUM BLD-SCNC: 140 MMOL/L (ref 136–145)
TOTAL PROTEIN: 6.2 G/DL (ref 6.4–8.2)
TRIGL SERPL-MCNC: 207 MG/DL (ref 0–150)
VITAMIN D 25-HYDROXY: 29.4 NG/ML
VLDLC SERPL CALC-MCNC: 41 MG/DL
WBC # BLD: 6.1 K/UL (ref 4–11)

## 2022-06-11 PROCEDURE — G2066 INTER DEVC REMOTE 30D: HCPCS | Performed by: INTERNAL MEDICINE

## 2022-06-11 PROCEDURE — 93298 REM INTERROG DEV EVAL SCRMS: CPT | Performed by: INTERNAL MEDICINE

## 2022-06-13 ENCOUNTER — NURSE ONLY (OUTPATIENT)
Dept: CARDIOLOGY CLINIC | Age: 77
End: 2022-06-13
Payer: MEDICARE

## 2022-06-13 DIAGNOSIS — Z45.09 ENCOUNTER FOR ELECTRONIC ANALYSIS OF REVEAL EVENT RECORDER: ICD-10-CM

## 2022-06-13 DIAGNOSIS — R00.1 BRADYCARDIA: ICD-10-CM

## 2022-06-15 ENCOUNTER — TELEPHONE (OUTPATIENT)
Dept: CARDIOLOGY CLINIC | Age: 77
End: 2022-06-15

## 2022-06-15 NOTE — TELEPHONE ENCOUNTER
----- Message from Moses Johnson MD sent at 6/14/2022  5:08 PM EDT -----  Call patient. His labs look okay, except vitamin D is a little low. Start taking 2 vitamin D (OTC) daily. Take for 3 months, then go back to 1 a day.   [facility]

## 2022-06-15 NOTE — TELEPHONE ENCOUNTER
Left message for pt with instructions on home phone. Called mobile phone and spoke with wife (per GUICHO) and gave instructions as directed. She verbalizes understanding and he will start Vitamin D 4000 units x 3 months.

## 2022-06-16 ENCOUNTER — OFFICE VISIT (OUTPATIENT)
Dept: INTERNAL MEDICINE CLINIC | Age: 77
End: 2022-06-16
Payer: MEDICARE

## 2022-06-16 VITALS
OXYGEN SATURATION: 97 % | WEIGHT: 239 LBS | DIASTOLIC BLOOD PRESSURE: 80 MMHG | BODY MASS INDEX: 35.4 KG/M2 | HEART RATE: 72 BPM | SYSTOLIC BLOOD PRESSURE: 112 MMHG | HEIGHT: 69 IN

## 2022-06-16 DIAGNOSIS — R35.1 BENIGN PROSTATIC HYPERPLASIA WITH NOCTURIA: ICD-10-CM

## 2022-06-16 DIAGNOSIS — N18.30 STAGE 3 CHRONIC KIDNEY DISEASE, UNSPECIFIED WHETHER STAGE 3A OR 3B CKD (HCC): ICD-10-CM

## 2022-06-16 DIAGNOSIS — I48.0 PAROXYSMAL ATRIAL FIBRILLATION (HCC): ICD-10-CM

## 2022-06-16 DIAGNOSIS — Z12.5 PROSTATE CANCER SCREENING: ICD-10-CM

## 2022-06-16 DIAGNOSIS — I10 ESSENTIAL HYPERTENSION: ICD-10-CM

## 2022-06-16 DIAGNOSIS — I50.32 CHRONIC DIASTOLIC HEART FAILURE (HCC): Chronic | ICD-10-CM

## 2022-06-16 DIAGNOSIS — E78.2 MIXED HYPERLIPIDEMIA: ICD-10-CM

## 2022-06-16 DIAGNOSIS — E11.9 TYPE 2 DIABETES MELLITUS WITHOUT COMPLICATION, WITHOUT LONG-TERM CURRENT USE OF INSULIN (HCC): Chronic | ICD-10-CM

## 2022-06-16 DIAGNOSIS — N40.1 BENIGN PROSTATIC HYPERPLASIA WITH NOCTURIA: ICD-10-CM

## 2022-06-16 DIAGNOSIS — G47.33 OBSTRUCTIVE SLEEP APNEA SYNDROME: ICD-10-CM

## 2022-06-16 DIAGNOSIS — K21.9 GASTROESOPHAGEAL REFLUX DISEASE WITHOUT ESOPHAGITIS: Primary | Chronic | ICD-10-CM

## 2022-06-16 PROBLEM — R00.1 BRADYCARDIA: Status: RESOLVED | Noted: 2020-07-02 | Resolved: 2022-06-16

## 2022-06-16 PROCEDURE — 1123F ACP DISCUSS/DSCN MKR DOCD: CPT | Performed by: INTERNAL MEDICINE

## 2022-06-16 PROCEDURE — 99214 OFFICE O/P EST MOD 30 MIN: CPT | Performed by: INTERNAL MEDICINE

## 2022-06-16 PROCEDURE — 1036F TOBACCO NON-USER: CPT | Performed by: INTERNAL MEDICINE

## 2022-06-16 PROCEDURE — G8417 CALC BMI ABV UP PARAM F/U: HCPCS | Performed by: INTERNAL MEDICINE

## 2022-06-16 PROCEDURE — G8427 DOCREV CUR MEDS BY ELIG CLIN: HCPCS | Performed by: INTERNAL MEDICINE

## 2022-06-16 RX ORDER — AMLODIPINE BESYLATE 5 MG/1
5 TABLET ORAL DAILY
Qty: 90 TABLET | Refills: 1 | Status: SHIPPED | OUTPATIENT
Start: 2022-06-16

## 2022-06-16 RX ORDER — EZETIMIBE 10 MG/1
10 TABLET ORAL DAILY
Qty: 90 TABLET | Refills: 1 | Status: SHIPPED | OUTPATIENT
Start: 2022-06-16

## 2022-06-16 RX ORDER — VITAMIN B COMPLEX
1000 TABLET ORAL DAILY
COMMUNITY

## 2022-06-16 RX ORDER — AMLODIPINE BESYLATE 5 MG/1
5 TABLET ORAL DAILY
COMMUNITY
End: 2022-06-16 | Stop reason: SDUPTHER

## 2022-06-16 RX ORDER — VITAMIN E 268 MG
400 CAPSULE ORAL DAILY
COMMUNITY

## 2022-06-16 RX ORDER — MULTIVITAMIN/IRON/FOLIC ACID 18MG-0.4MG
1.5 TABLET ORAL DAILY
COMMUNITY

## 2022-06-16 RX ORDER — PRAVASTATIN SODIUM 80 MG/1
80 TABLET ORAL EVERY EVENING
Qty: 90 TABLET | Refills: 1 | Status: SHIPPED | OUTPATIENT
Start: 2022-06-16 | End: 2023-06-16

## 2022-06-16 RX ORDER — OMEPRAZOLE 40 MG/1
40 CAPSULE, DELAYED RELEASE ORAL DAILY
Qty: 90 CAPSULE | Refills: 1 | Status: SHIPPED | OUTPATIENT
Start: 2022-06-16

## 2022-06-16 SDOH — ECONOMIC STABILITY: FOOD INSECURITY: WITHIN THE PAST 12 MONTHS, YOU WORRIED THAT YOUR FOOD WOULD RUN OUT BEFORE YOU GOT MONEY TO BUY MORE.: NEVER TRUE

## 2022-06-16 SDOH — ECONOMIC STABILITY: FOOD INSECURITY: WITHIN THE PAST 12 MONTHS, THE FOOD YOU BOUGHT JUST DIDN'T LAST AND YOU DIDN'T HAVE MONEY TO GET MORE.: NEVER TRUE

## 2022-06-16 ASSESSMENT — ENCOUNTER SYMPTOMS
CONSTIPATION: 0
SHORTNESS OF BREATH: 0
COLOR CHANGE: 0
VOMITING: 0
ABDOMINAL PAIN: 0
WHEEZING: 0
BACK PAIN: 0
SORE THROAT: 0
NAUSEA: 0
COUGH: 0
CHEST TIGHTNESS: 0

## 2022-06-16 ASSESSMENT — PATIENT HEALTH QUESTIONNAIRE - PHQ9
SUM OF ALL RESPONSES TO PHQ QUESTIONS 1-9: 0
2. FEELING DOWN, DEPRESSED OR HOPELESS: 0
SUM OF ALL RESPONSES TO PHQ QUESTIONS 1-9: 0
1. LITTLE INTEREST OR PLEASURE IN DOING THINGS: 0
SUM OF ALL RESPONSES TO PHQ QUESTIONS 1-9: 0
SUM OF ALL RESPONSES TO PHQ QUESTIONS 1-9: 0
SUM OF ALL RESPONSES TO PHQ9 QUESTIONS 1 & 2: 0

## 2022-06-16 ASSESSMENT — SOCIAL DETERMINANTS OF HEALTH (SDOH): HOW HARD IS IT FOR YOU TO PAY FOR THE VERY BASICS LIKE FOOD, HOUSING, MEDICAL CARE, AND HEATING?: NOT HARD AT ALL

## 2022-06-16 NOTE — ASSESSMENT & PLAN NOTE
Recent lab work is stable with kidney function at baseline, blood pressure is well controlled, advised to adhere to low-salt diet, avoid NSAIDs and other nephrotoxic agents

## 2022-06-16 NOTE — ASSESSMENT & PLAN NOTE
Was checked recently by cardiology, continue statin with Zetia along with maintaining low-carb low-fat diet

## 2022-06-16 NOTE — ASSESSMENT & PLAN NOTE
Blood pressure stable and well-controlled on current combination of losartan, hydrochlorothiazide and amlodipine, recent labs reviewed, as stated above reinforced salt restriction, weight reduction, healthy diet and regular exercise as other means to help control blood pressure

## 2022-06-16 NOTE — PROGRESS NOTES
ASSESSMENT/PLAN:  1. Gastroesophageal reflux disease without esophagitis  Assessment & Plan:   Symptoms stable and well-controlled on current dose of omeprazole, will refill and continue the same, encouraged maintaining antireflux diet, attempt weight loss and follow GERD lifestyle modification changes  Orders:  -     omeprazole (PRILOSEC) 40 MG delayed release capsule; Take 1 capsule by mouth daily, Disp-90 capsule, R-1Normal  2. Type 2 diabetes mellitus without complication, without long-term current use of insulin ([facility])  Assessment & Plan:   Patient states he is not aware that he was diabetic although labs dating back to 2019 showing him hemoglobin A1c up to 7.4, last hemoglobin A1c check was over a year ago of 6.2, advised patient will update labs, if still under 7 we will continue with diet control and lifestyle modification changes however if higher than 7 will need to be started on medications. Diet and exercise recommendations discussed with patient at length, recommendations adhere to low-carb diet and avoid concentrated sweets and starchy food items made to patient and encouraged to attempt weight loss. Foot exam is negative for evidence of neuropathy, he does follow-up with ophthalmology yearly  Orders:  -     Hemoglobin A1C; Future  -     Microalbumin / Creatinine Urine Ratio; Future  3. Obstructive sleep apnea syndrome  Assessment & Plan: This has been stable, reports compliance with CPAP, continue same  4. Stage 3 chronic kidney disease, unspecified whether stage 3a or 3b CKD ([facility] [address]  Assessment & Plan:   Recent lab work is stable with kidney function at baseline, blood pressure is well controlled, advised to adhere to low-salt diet, avoid NSAIDs and other nephrotoxic agents  5.  Essential hypertension  Assessment & Plan:   Blood pressure stable and well-controlled on current combination of losartan, hydrochlorothiazide and amlodipine, recent labs reviewed, as stated above reinforced salt restriction, weight reduction, healthy diet and regular exercise as other means to help control blood pressure  Orders:  -     amLODIPine (NORVASC) 5 MG tablet; Take 1 tablet by mouth daily, Disp-90 tablet, R-1Normal  6. Mixed hyperlipidemia  Assessment & Plan:   Was checked recently by cardiology, continue statin with Zetia along with maintaining low-carb low-fat diet  Orders:  -     ezetimibe (ZETIA) 10 MG tablet; Take 1 tablet by mouth daily, Disp-90 tablet, R-1Normal  -     pravastatin (PRAVACHOL) 80 MG tablet; Take 1 tablet by mouth every evening, Disp-90 tablet, R-1Normal  -     TSH with Reflex to FT4; Future  7. Benign prostatic hyperplasia with nocturia  Assessment & Plan:   Does have dribbling with nighttime nocturia, continue current dose of Flomax, follow-up with urology as scheduled on yearly basis, check PSA level  Orders:  -     PSA, Prostatic Specific Antigen; Future  8. Prostate cancer screening  -     PSA, Prostatic Specific Antigen; Future  9. Paroxysmal atrial fibrillation (HCC)  Assessment & Plan:   Stable and rate well controlled, remains on Eliquis under supervision of cardiology, continue same  10. Chronic diastolic heart failure [facility])  Assessment & Plan:   Stable, continue care and recommendations as per cardiology      Return in about 6 months (around 12/16/2022) for AWV. SUBJECTIVE  HPI:   She is here to establish new patient office visits, transferring care from Dr. Terri Porter, no records in [facility] for PCP however he does follow-up with cardiology regularly  He is a 73-year-old male with known history of hypertension, hyperlipidemia, chronic congestive heart failure and coronary artery disease. He is also with history of paroxysmal atrial fibrillation and PVCs, maintained on anticoagulation therapy. He has sleep apnea and uses CPAP nightly.   He is  and lives with his wife, he is fairly independent with ADLs      Review of Systems   Constitutional: Negative for activity change, appetite change, fatigue and unexpected weight change. HENT: Negative for congestion, hearing loss, mouth sores and sore throat. Eyes: Negative for visual disturbance. Respiratory: Negative for cough, chest tightness, shortness of breath and wheezing. Cardiovascular: Positive for leg swelling. Negative for chest pain and palpitations. Gastrointestinal: Negative for abdominal pain, constipation, nausea and vomiting. Genitourinary: Negative for difficulty urinating, dysuria, frequency, hematuria and urgency. Musculoskeletal: Positive for arthralgias. Negative for back pain, gait problem and joint swelling. Skin: Negative for color change. Allergic/Immunologic: Negative for environmental allergies and immunocompromised state. Neurological: Negative for dizziness, light-headedness and headaches. Psychiatric/Behavioral: Negative for behavioral problems and dysphoric mood. The patient is not nervous/anxious. OBJECTIVE:    /80   Pulse 72   Ht 5' 8.5\" (1.74 m)   Wt 239 lb (108.4 kg)   SpO2 97%   BMI 35.81 kg/m²    Physical Exam  Constitutional:       General: He is not in acute distress. Appearance: Normal appearance. He is obese. He is not toxic-appearing. HENT:      Head: Normocephalic. Eyes:      Conjunctiva/sclera: Conjunctivae normal.   Cardiovascular:      Rate and Rhythm: Normal rate and regular rhythm. Heart sounds: Normal heart sounds. No murmur heard. Pulmonary:      Effort: Pulmonary effort is normal. No respiratory distress. Breath sounds: Normal breath sounds. No wheezing. Abdominal:      Palpations: Abdomen is soft. Tenderness: There is no abdominal tenderness. Musculoskeletal:         General: Normal range of motion. Cervical back: Neck supple. Right lower leg: Edema present. Left lower leg: Edema present. Skin:     General: Skin is warm and dry. Neurological:      General: No focal deficit present. Mental Status: He is alert. Cranial Nerves: No cranial nerve deficit. Motor: No weakness. Psychiatric:         Mood and Affect: Mood normal.         Behavior: Behavior normal.           Electronically signed by Tammie Wilkes MD on 6/16/2022 at 3:47 PM.    This dictation was generated by voice recognition computer software. Although all attempts are made to edit the dictation for accuracy, there may be errors in the transcription that are not intended.

## 2022-06-16 NOTE — ASSESSMENT & PLAN NOTE
Does have dribbling with nighttime nocturia, continue current dose of Flomax, follow-up with urology as scheduled on yearly basis, check PSA level

## 2022-06-16 NOTE — ASSESSMENT & PLAN NOTE
Symptoms stable and well-controlled on current dose of omeprazole, will refill and continue the same, encouraged maintaining antireflux diet, attempt weight loss and follow GERD lifestyle modification changes

## 2022-06-16 NOTE — ASSESSMENT & PLAN NOTE
Patient states he is not aware that he was diabetic although labs dating back to 2019 showing him hemoglobin A1c up to 7.4, last hemoglobin A1c check was over a year ago of 6.2, advised patient will update labs, if still under 7 we will continue with diet control and lifestyle modification changes however if higher than 7 will need to be started on medications. Diet and exercise recommendations discussed with patient at length, recommendations adhere to low-carb diet and avoid concentrated sweets and starchy food items made to patient and encouraged to attempt weight loss.   Foot exam is negative for evidence of neuropathy, he does follow-up with ophthalmology yearly

## 2022-06-22 DIAGNOSIS — R35.1 BENIGN PROSTATIC HYPERPLASIA WITH NOCTURIA: ICD-10-CM

## 2022-06-22 DIAGNOSIS — N40.1 BENIGN PROSTATIC HYPERPLASIA WITH NOCTURIA: ICD-10-CM

## 2022-06-22 DIAGNOSIS — E78.2 MIXED HYPERLIPIDEMIA: ICD-10-CM

## 2022-06-22 DIAGNOSIS — Z12.5 PROSTATE CANCER SCREENING: ICD-10-CM

## 2022-06-22 DIAGNOSIS — E11.9 TYPE 2 DIABETES MELLITUS WITHOUT COMPLICATION, WITHOUT LONG-TERM CURRENT USE OF INSULIN (HCC): Chronic | ICD-10-CM

## 2022-06-22 LAB
CREATININE URINE: 105.7 MG/DL (ref 39–259)
MICROALBUMIN UR-MCNC: <1.2 MG/DL
MICROALBUMIN/CREAT UR-RTO: NORMAL MG/G (ref 0–30)
PROSTATE SPECIFIC ANTIGEN: 2.64 NG/ML (ref 0–4)
TSH REFLEX FT4: 1.12 UIU/ML (ref 0.27–4.2)

## 2022-06-23 LAB
ESTIMATED AVERAGE GLUCOSE: 148.5 MG/DL
HBA1C MFR BLD: 6.8 %

## 2022-07-18 ENCOUNTER — NURSE ONLY (OUTPATIENT)
Dept: CARDIOLOGY CLINIC | Age: 77
End: 2022-07-18
Payer: MEDICARE

## 2022-07-18 DIAGNOSIS — Z45.09 ENCOUNTER FOR ELECTRONIC ANALYSIS OF REVEAL EVENT RECORDER: ICD-10-CM

## 2022-07-18 DIAGNOSIS — I48.0 PAROXYSMAL ATRIAL FIBRILLATION (HCC): Primary | ICD-10-CM

## 2022-07-18 PROCEDURE — 93298 REM INTERROG DEV EVAL SCRMS: CPT | Performed by: INTERNAL MEDICINE

## 2022-07-18 PROCEDURE — G2066 INTER DEVC REMOTE 30D: HCPCS | Performed by: INTERNAL MEDICINE

## 2022-07-18 NOTE — PROGRESS NOTES
We received a remote transmission from patient's monitor at home. Remote Linq report shows gallito recording and known AF. Noted RVR. Pt is on Eliquis. EP physician to review. We will continue to monitor remotely. End of 31-day monitoring period 7-18-22.

## 2022-08-10 ENCOUNTER — TELEPHONE (OUTPATIENT)
Dept: CARDIOLOGY CLINIC | Age: 77
End: 2022-08-10

## 2022-08-18 ENCOUNTER — TELEPHONE (OUTPATIENT)
Dept: CARDIOLOGY CLINIC | Age: 77
End: 2022-08-18

## 2022-08-18 NOTE — TELEPHONE ENCOUNTER
Ok to hold eliquis 2 days prior to procedure.  Resumption is up to physician doing the procedure    Gamaliel Mention, APRN-CNP

## 2022-08-18 NOTE — TELEPHONE ENCOUNTER
Pt is having colonoscopy done 8/25, pt wants to know when he should stop his eliquis?       Pls advise thank you

## 2022-08-22 ENCOUNTER — NURSE ONLY (OUTPATIENT)
Dept: CARDIOLOGY CLINIC | Age: 77
End: 2022-08-22
Payer: MEDICARE

## 2022-08-22 DIAGNOSIS — G45.9 TIA (TRANSIENT ISCHEMIC ATTACK): Primary | ICD-10-CM

## 2022-08-22 DIAGNOSIS — Z45.09 ENCOUNTER FOR ELECTRONIC ANALYSIS OF REVEAL EVENT RECORDER: ICD-10-CM

## 2022-08-22 PROCEDURE — G2066 INTER DEVC REMOTE 30D: HCPCS | Performed by: INTERNAL MEDICINE

## 2022-08-22 PROCEDURE — 93298 REM INTERROG DEV EVAL SCRMS: CPT | Performed by: INTERNAL MEDICINE

## 2022-08-22 NOTE — PROGRESS NOTES
We received a remote transmission from patient's monitor at home. Remote Linq report shows AF with RVR. Pause recording not real. This shows under sensing. Pt is on [email or web address]. EP physician to review. We will continue to monitor remotely. Implanted for stroke. End of 31-day monitoring period 8-22-22.

## 2022-09-26 ENCOUNTER — NURSE ONLY (OUTPATIENT)
Dept: CARDIOLOGY CLINIC | Age: 77
End: 2022-09-26
Payer: MEDICARE

## 2022-09-26 DIAGNOSIS — Z45.09 ENCOUNTER FOR ELECTRONIC ANALYSIS OF REVEAL EVENT RECORDER: ICD-10-CM

## 2022-09-26 DIAGNOSIS — G45.9 TIA (TRANSIENT ISCHEMIC ATTACK): Primary | ICD-10-CM

## 2022-09-26 PROCEDURE — 93298 REM INTERROG DEV EVAL SCRMS: CPT | Performed by: INTERNAL MEDICINE

## 2022-09-26 PROCEDURE — G2066 INTER DEVC REMOTE 30D: HCPCS | Performed by: INTERNAL MEDICINE

## 2022-09-26 NOTE — PROGRESS NOTES
We received a remote transmission from patient's monitor at home. Remote Linq report shows AF, SVT and nocturnal gallito recordings. Pt is on Eliquis. EP physician to review. We will continue to monitor remotely. Implanted for stroke. End of 31-day monitoring period 9-26-22.

## 2022-10-10 ENCOUNTER — OFFICE VISIT (OUTPATIENT)
Dept: PULMONOLOGY | Age: 77
End: 2022-10-10
Payer: MEDICARE

## 2022-10-10 VITALS
WEIGHT: 237.8 LBS | DIASTOLIC BLOOD PRESSURE: 70 MMHG | HEART RATE: 90 BPM | HEIGHT: 69 IN | BODY MASS INDEX: 35.22 KG/M2 | OXYGEN SATURATION: 96 % | SYSTOLIC BLOOD PRESSURE: 102 MMHG

## 2022-10-10 DIAGNOSIS — I25.10 MILD CAD: Chronic | ICD-10-CM

## 2022-10-10 DIAGNOSIS — E66.01 SEVERE OBESITY (BMI 35.0-39.9) WITH COMORBIDITY (HCC): ICD-10-CM

## 2022-10-10 DIAGNOSIS — I10 ESSENTIAL HYPERTENSION: ICD-10-CM

## 2022-10-10 DIAGNOSIS — I48.0 PAROXYSMAL ATRIAL FIBRILLATION (HCC): ICD-10-CM

## 2022-10-10 DIAGNOSIS — I50.32 CHRONIC DIASTOLIC HEART FAILURE (HCC): Chronic | ICD-10-CM

## 2022-10-10 DIAGNOSIS — G47.33 OBSTRUCTIVE SLEEP APNEA SYNDROME: Primary | ICD-10-CM

## 2022-10-10 PROCEDURE — 1036F TOBACCO NON-USER: CPT | Performed by: NURSE PRACTITIONER

## 2022-10-10 PROCEDURE — G8417 CALC BMI ABV UP PARAM F/U: HCPCS | Performed by: NURSE PRACTITIONER

## 2022-10-10 PROCEDURE — 1123F ACP DISCUSS/DSCN MKR DOCD: CPT | Performed by: NURSE PRACTITIONER

## 2022-10-10 PROCEDURE — G8484 FLU IMMUNIZE NO ADMIN: HCPCS | Performed by: NURSE PRACTITIONER

## 2022-10-10 PROCEDURE — G8427 DOCREV CUR MEDS BY ELIG CLIN: HCPCS | Performed by: NURSE PRACTITIONER

## 2022-10-10 PROCEDURE — 99214 OFFICE O/P EST MOD 30 MIN: CPT | Performed by: NURSE PRACTITIONER

## 2022-10-10 ASSESSMENT — SLEEP AND FATIGUE QUESTIONNAIRES
HOW LIKELY ARE YOU TO NOD OFF OR FALL ASLEEP WHILE SITTING QUIETLY AFTER LUNCH WITHOUT ALCOHOL: 2
HOW LIKELY ARE YOU TO NOD OFF OR FALL ASLEEP WHILE LYING DOWN TO REST IN THE AFTERNOON WHEN CIRCUMSTANCES PERMIT: 2
ESS TOTAL SCORE: 14
HOW LIKELY ARE YOU TO NOD OFF OR FALL ASLEEP WHILE SITTING AND READING: 2
HOW LIKELY ARE YOU TO NOD OFF OR FALL ASLEEP WHILE SITTING AND TALKING TO SOMEONE: 1
HOW LIKELY ARE YOU TO NOD OFF OR FALL ASLEEP WHEN YOU ARE A PASSENGER IN A CAR FOR AN HOUR WITHOUT A BREAK: 2
HOW LIKELY ARE YOU TO NOD OFF OR FALL ASLEEP IN A CAR, WHILE STOPPED FOR A FEW MINUTES IN TRAFFIC: 2
HOW LIKELY ARE YOU TO NOD OFF OR FALL ASLEEP WHILE SITTING INACTIVE IN A PUBLIC PLACE: 1
HOW LIKELY ARE YOU TO NOD OFF OR FALL ASLEEP WHILE WATCHING TV: 2

## 2022-10-10 NOTE — LETTER
[facility] Sleep Medicine  [address] [facility] [address] [address]  Phone: [phone]  Fax: [phone]    October 10, 2022       Patient: Trevor Joseph   MR Number: [id number]   YOB: 1945   Date of Visit: 10/10/2022       Mejia Chandler was seen for a follow up visit today. Here is my assessment and plan as well as an attached copy of his visit today:    Mild CAD  Chronic- Stable. Discussed the importance of treating obstructive sleep apnea as part of the management of this disorder. Cont any meds per PCP and other physicians. Chronic diastolic heart failure (HCC)   Chronic- Stable. Discussed the importance of treating obstructive sleep apnea as part of the management of this disorder. Cont any meds per PCP and other physicians. Essential hypertension  Chronic- Stable. Discussed the importance of treating obstructive sleep apnea as part of the management of this disorder. Cont any meds per PCP and other physicians. Paroxysmal atrial fibrillation (HCC)   Chronic- Stable. Discussed the importance of treating obstructive sleep apnea as part of the management of this disorder. Cont any meds per PCP and other physicians. Severe obesity (BMI 35.0-39. 9) with comorbidity ([address]  Chronic-not stable:  Discussed importance of treating obstructive sleep apnea and getting sufficient sleep to assist with weight control. Encouraged him to work on weight loss through diet and exercise. Recommended DASH or Mediterranean diets. Obstructive sleep apnea syndrome   Chronic-with progression/exacerbation: Reviewed and analyzed results of physiologic download from patient's machine and reviewed with patient. Supplies and parts as needed for his machine. These are medically necessary. Limit caffeine use after 3pm. Based on the analyzed data will change following settings: EPAP min increased to 12.   Will trial pressure increase to see if this will help with sleep maintenance and keeping his mask on during the night. Encouraged him to work with his equipment company on mask fit. Discussed trying mask liners to help control mask leak and skin irritation. Encouraged consistent use of his machine each night, all night. Discussed the importance of treating AUDIE from a physiological standpoint especially given his cardiac history. Instructed not to drive unless had 4 hrs of effective therapy for his AUDIE the night before. No driving when sleepy. Did review the risks of under or untreated AUDIE including, but not limited to, higher risks of motor vehicle accidents, stroke, heart attacks, and death. He understands and accepts all these risks. Will see him back in 3 months. Encouraged him to contact the office with any questions or concerns. Discussed the recall of Repironics devices with patient and the severity of his sleep apnea. Discussed the risks of untreated sleep apnea including but not limited to car accidents, heart attacks, strokes, and death. Alternative therapy may not exist or may be severely limited. Felt the benefits of continued usage of these devices may outweigh the risks identified in the recall notification. Advised to avoid use of unproven cleaning methods, such as ozone. Due to the unknown variables each patient will have to make a determination in his/her own. Please contact your equipment company for further instruction until an alternative is found. Encouraged patient to register his machine for the recall and provided phone number. If you have questions or concerns, please do not hesitate to call me. I look forward to following Ricci Arriaga along with you.     Sincerely,    FAHAD Joel providers:  Sarah Dallas MD  [address] [phone]  [address] [place]

## 2022-10-10 NOTE — PROGRESS NOTES
Francisco Schaeffer MD  Sherine Beat CNP [facility]  [address] [facility], [address] [facility]- [phone]   [facility] [address]. [address] [place] [address]. Arvind Madsen [address] [phone] [address] [facility] SLEEP MEDICINE  [address] [place] [id number] [phone]      Assessment/Plan:      1. Obstructive sleep apnea syndrome  Assessment & Plan:   Chronic-with progression/exacerbation: Reviewed and analyzed results of physiologic download from patient's machine and reviewed with patient. Supplies and parts as needed for his machine. These are medically necessary. Limit caffeine use after 3pm. Based on the analyzed data will change following settings: EPAP min increased to 12. Will trial pressure increase to see if this will help with sleep maintenance and keeping his mask on during the night. Encouraged him to work with his equipment company on mask fit. Discussed trying mask liners to help control mask leak and skin irritation. Encouraged consistent use of his machine each night, all night. Discussed the importance of treating AUDIE from a physiological standpoint especially given his cardiac history. Instructed not to drive unless had 4 hrs of effective therapy for his AUDIE the night before. No driving when sleepy. Did review the risks of under or untreated AUDIE including, but not limited to, higher risks of motor vehicle accidents, stroke, heart attacks, and death. He understands and accepts all these risks. Will see him back in 3 months. Encouraged him to contact the office with any questions or concerns. Discussed the recall of Repironics devices with patient and the severity of his sleep apnea. Discussed the risks of untreated sleep apnea including but not limited to car accidents, heart attacks, strokes, and death. Alternative therapy may not exist or may be severely limited.   Felt the benefits of continued usage of these devices may outweigh the risks identified in the recall notification. Advised to avoid use of unproven cleaning methods, such as ozone. Due to the unknown variables each patient will have to make a determination in his/her own. Please contact your equipment company for further instruction until an alternative is found. Encouraged patient to register his machine for the recall and provided phone number. 2. Mild CAD  Assessment & Plan:  Chronic- Stable. Discussed the importance of treating obstructive sleep apnea as part of the management of this disorder. Cont any meds per PCP and other physicians. 3. Chronic diastolic heart failure (HCC)  Assessment & Plan:   Chronic- Stable. Discussed the importance of treating obstructive sleep apnea as part of the management of this disorder. Cont any meds per PCP and other physicians. 4. Paroxysmal atrial fibrillation (HCC)  Assessment & Plan:   Chronic- Stable. Discussed the importance of treating obstructive sleep apnea as part of the management of this disorder. Cont any meds per PCP and other physicians. 5. Essential hypertension  Assessment & Plan:  Chronic- Stable. Discussed the importance of treating obstructive sleep apnea as part of the management of this disorder. Cont any meds per PCP and other physicians. 6. Severe obesity (BMI 35.0-39. 9) with comorbidity ([facility] [address]  Assessment & Plan:  Chronic-not stable:  Discussed importance of treating obstructive sleep apnea and getting sufficient sleep to assist with weight control. Encouraged him to work on weight loss through diet and exercise. Recommended DASH or Mediterranean diets. Reviewed, analyzed, and documented physiologic data from patient's PAP machine. This information was analyzed to assess complexity and medical decision making in regards to further testing and management. The primary encounter diagnosis was Obstructive sleep apnea syndrome.  Diagnoses of Mild CAD, Chronic diastolic heart failure ([facility] [address] Paroxysmal atrial fibrillation ([facility] [address] Essential hypertension, and Severe obesity (BMI 35.0-39. 9) with comorbidity ([facility] [address] were also pertinent to this visit. The chronic medical conditions listed are directly related to the primary diagnosis listed above. The management of the primary diagnosis affects the secondary diagnosis and vice versa. Subjective:   Subjective   Patient ID: Rachel Quiñonez is a 76 y.o. male. Chief Complaint   Patient presents with    Sleep Apnea       HPI:  Machine Modem/Download Info:  Compliance (hours/night): 3.2 hrs/night  % of nights >= 4 hrs: 28.9 %  Download AHI (/hour): 1.3 /HR   Average IPAP Pressure: 14 cmH2O  Average EPAP Pressure: 10 cmH2O         AUTO BIPAP - Settings (Srinivas)  IPAP Max: 25 cmH2O  EPAP Min: 10 cmH2O  Pressure Support Min: 3  Pressure Support Max: 7             Comfort Settings  Humidity Level (0-8): 5  Flex/EPR (0-3): 3 PAP Mask  Mask Type: Full Face mask     Rachel Quiñonez reports he is doing well with his machine. He uses his machine each night but is only averaging around 3 hours each night. He will usually take his mask off for bathroom and not put it back on when he comes back to bed. Patient's wife reports that sometimes he will take his mask off during the night without realizing. He is needing new supplies but reports he was told by his equipment company that he was not allowed to get new supplies due to the recall on his machine. The pressure on his machine is comfortable. he denies headaches, congestion, nosebleeds, dryness, aerophagia, or drowsiness while driving. He has registered his machine for the  recall and is currently awaiting replacement.     [employer]    [place] -  [place] Sleepiness Score: 14    Social History     Socioeconomic History    Marital status:      Spouse name: Aidan Donaldson    Number of children: 3    Years of education: 16    Highest education level: Not on file Occupational History    Not on file   Tobacco Use    Smoking status: Former     Packs/day: 1.00     Years: 16.00     Pack years: 16.00     Types: Cigarettes     Quit date: 1977     Years since quittin.4    Smokeless tobacco: Never   Vaping Use    Vaping Use: Never used   Substance and Sexual Activity    Alcohol use:  Yes     Alcohol/week: 4.0 standard drinks     Types: 1 Glasses of wine, 1 Cans of beer, 2 Standard drinks or equivalent per week    Drug use: Never    Sexual activity: Yes     Partners: Female   Other Topics Concern    Not on file   Social History Narrative    Not on file     Social Determinants of Health     Financial Resource Strain: Low Risk     Difficulty of Paying Living Expenses: Not hard at all   Food Insecurity: No Food Insecurity    Worried About Running Out of Food in the Last Year: Never true    Ran Out of Food in the Last Year: Never true   Transportation Needs: Not on file   Physical Activity: Not on file   Stress: Not on file   Social Connections: Not on file   Intimate Partner Violence: Not on file   Housing Stability: Not on file       Current Outpatient Medications   Medication Instructions    amLODIPine (NORVASC) 5 mg, Oral, DAILY    apixaban (ELIQUIS) 5 mg, Oral, 2 TIMES DAILY    Ascorbic Acid (VITAMIN C PO) 1,000 mg, Oral, DAILY    aspirin 81 mg, Oral, DAILY    b complex vitamins capsule 1 capsule, Oral, DAILY    blood glucose test strips (GLUCOSE METER TEST) strip 1 each, In Vitro, DAILY    ezetimibe (ZETIA) 10 mg, Oral, DAILY    Glucosamine-Chondroitin 2790-4651 MG/30ML LIQD 1.5 oz, Oral, DAILY    hydroCHLOROthiazide (HYDRODIURIL) 25 MG tablet Take 1 tablet 5 days weekly on Monday, Wednesday, Thursday, Friday and     Lancets MISC 1 each, Does not apply, DAILY    losartan (COZAAR) 100 mg, Oral, DAILY    mometasone (NASONEX) 50 MCG/ACT nasal spray 2 sprays, Each Nostril, DAILY    omeprazole (PRILOSEC) 40 mg, Oral, DAILY    pravastatin (PRAVACHOL) 80 mg, Oral, EVERY EVENING    solifenacin (VESICARE) 10 mg, Oral, DAILY    tamsulosin (FLOMAX) 0.4 mg, Oral, DAILY    Vitamin D (CHOLECALCIFEROL) 1,000 Units, Oral, DAILY    vitamin E 400 Units, Oral, DAILY          Vitals:  Weight BMI   Wt Readings from Last 3 Encounters:   10/10/22 237 lb 12.8 oz (107.9 kg)   06/16/22 239 lb (108.4 kg)   05/13/22 233 lb (105.7 kg)    Body mass index is 35.63 kg/m².      BP HR SaO2   BP Readings from Last 3 Encounters:   10/10/22 102/70   06/16/22 112/80   05/13/22 102/70    Pulse Readings from Last 3 Encounters:   10/10/22 90   06/16/22 72   05/13/22 65    SpO2 Readings from Last 3 Encounters:   10/10/22 96%   06/16/22 97%   05/13/22 94%        Electronically signed by FAHAD Rob on 10/10/2022 at 9:27 PM

## 2022-10-11 NOTE — PROGRESS NOTES
Diagnosis: [x] AUDIE (G47.33) [] CSA (G47.31) [] Apnea (G47.30)   Length of Need: [x] 15 Months [] 99 Months [] Other:   Machine (JUAN MIGUEL!): [] Respironics Dream Station      Auto [] ResMed AirSense     Auto [] Other:     []  CPAP () [] Bilevel ()   Mode: [] Auto [] Spontaneous    Mode: [] Auto [] Spontaneous             Comfort Settings:      Humidifier: [] Heated ()        [x] Water chamber replacement ()/ 1 per 6 months        Mask:   [] Nasal () /1 per 3 months [x] Full Face () /1 per 3 months   [] Patient choice -Size and fit mask [x] Patient Choice - Size and fit mask   [] Dispense: [] Dispense:   [] Headgear () / 1 per 3 months [x] Headgear () / 1 per 3 months   [] Replacement Nasal Cushion ()/2 per month [x] Interface Replacement ()/1 per month   [] Replacement Nasal Pillows ()/2 per month         Tubing: [x] Heated ()/1 per 3 months    [] Standard ()/1 per 3 months [] Other:           Filters: [x] Non-disposable ()/1 per 6 months     [x] Ultra-Fine, Disposable ()/2 per month        Miscellaneous: [] Chin Strap ()/ 1 per 6 months [] O2 bleed-in:        LPM   [] Oxymetry on CPAP/Bilevel []  Other:         Start Order Date: 10/10/22    MEDICAL JUSTIFICATION:  I, the undersigned, certify that the above prescribed supplies are medically necessary for this patients wellbeing. In my opinion, the supplies are both reasonable and necessary in reference to accepted standards of medicalpractice in treatment of this patients condition. Michael Rushing NP    NPI: [id number]       Order Signed Date: 10/10/22  [address] [facility]  Pulmonary, Sleep, and Critical Care    Pulmonary, Sleep, and Critical Care  [facility]ly [facility] [place] [address].  [facility], [address] [facility] , [address] [facility]  Phone: [phone]    Fax: [address] [place]  1945  [address]  [phone] (home)   [phone] (mobile)      Insurance Info (confirm with patient if correct):  Payer/Plan Subscr  Sex Relation Sub.  Ins. ID Effective Group Num

## 2022-10-31 ENCOUNTER — NURSE ONLY (OUTPATIENT)
Dept: CARDIOLOGY CLINIC | Age: 77
End: 2022-10-31
Payer: MEDICARE

## 2022-10-31 DIAGNOSIS — I48.0 PAROXYSMAL ATRIAL FIBRILLATION (HCC): Primary | ICD-10-CM

## 2022-10-31 DIAGNOSIS — Z45.09 ENCOUNTER FOR ELECTRONIC ANALYSIS OF REVEAL EVENT RECORDER: ICD-10-CM

## 2022-10-31 PROCEDURE — G2066 INTER DEVC REMOTE 30D: HCPCS | Performed by: INTERNAL MEDICINE

## 2022-10-31 PROCEDURE — 93298 REM INTERROG DEV EVAL SCRMS: CPT | Performed by: INTERNAL MEDICINE

## 2022-10-31 NOTE — PROGRESS NOTES
We received a remote transmission from patient's monitor at home. Remote Linq report shows AF, SVT SR with ectopy and nocturnal gallito recordings. Pt is on Eliquis. EP physician to review. We will continue to monitor remotely. Implanted for stroke. End of 31-day monitoring period 10-31-22.

## 2022-11-17 NOTE — PROGRESS NOTES
[address]  Advanced CHF/Pulmonary Hypertension   Cardiac Evaluation      Rachel Quiñonez  YOB: 1945    Date of Visit:  11/18/22    Chief Complaint   Patient presents with    Congestive Heart Failure      History of Present Illness:  Rachel Quiñonez is a 76 y.o. male who presents as a referral from my partner Dr. Izabel Keen for consultation and management of heart failure symptoms. His history includes HTN, HLD, RBBB, DM. He also was diagnosed with severe AUDIE in 2019 and uses a C-pap.  7/25/2019, he was seen in [facility] ER for fatigue and right sided weakness; he was diagnosed with a TIA and discharged on Plavix & asa. MCOT 8/2019 showed NSR with frequent PVC's (25% burden). 10/14/2019, he had ILR insertion for cryptogenic stroke. An LHC (Dr. Apple Gordon) same day showed very mild CAD to the LAD. On 9/22/2020, he had a PVC ablation per Dr. Izabel Keen. He has been diagnosed with Eczema. Today, he is here for regular follow up. Overall, he feels fine. He has some off balance issues at times, B/P runs low, has not fallen. He denies exertional chest pain, FALCON/PND, palpitations, light-headedness, edema. His wife is with him for the visit.     No Known Allergies  Current Outpatient Medications   Medication Sig Dispense Refill    Glucosamine-Chondroitin [id number] MG/30ML LIQD Take 1.5 oz by mouth daily      Vitamin D (CHOLECALCIFEROL) 25 MCG (1000 UT) TABS tablet Take 1,000 Units by mouth daily      vitamin E 400 UNIT capsule Take 400 Units by mouth daily      omeprazole (PRILOSEC) 40 MG delayed release capsule Take 1 capsule by mouth daily 90 capsule 1    ezetimibe (ZETIA) 10 MG tablet Take 1 tablet by mouth daily 90 tablet 1    pravastatin (PRAVACHOL) 80 MG tablet Take 1 tablet by mouth every evening 90 tablet 1    amLODIPine (NORVASC) 5 MG tablet Take 1 tablet by mouth daily 90 tablet 1    losartan (COZAAR) 100 MG tablet Take 1 tablet by mouth daily 90 tablet 3    hydroCHLOROthiazide (HYDRODIURIL) 25 MG tablet Take 1 tablet 5 days weekly on Monday, Wednesday, Thursday, Friday and Sunday 90 tablet 1    apixaban (ELIQUIS) 5 MG TABS tablet Take 1 tablet by mouth 2 times daily 180 tablet 3    Ascorbic Acid (VITAMIN C PO) Take 1,000 mg by mouth daily       tamsulosin (FLOMAX) 0.4 MG capsule Take 1 capsule by mouth daily 90 capsule 3    mometasone (NASONEX) 50 MCG/ACT nasal spray 2 sprays by Each Nostril route daily (Patient taking differently: 2 sprays by Each Nostril route daily as needed) 3 Inhaler 3    b complex vitamins capsule Take 1 capsule by mouth daily      solifenacin (VESICARE) 10 MG tablet Take 10 mg by mouth daily       aspirin 81 MG tablet Take 81 mg by mouth daily      blood glucose test strips (GLUCOSE METER TEST) strip 1 each by In Vitro route daily 300 each 4    Lancets MISC 1 each by Does not apply route daily 300 each 4     No current facility-administered medications for this visit.        Past Medical History:   Diagnosis Date    BPH     Colon polyps     DDD (degenerative disc disease), lumbar     Diverticulosis     Gallstone pancreatitis 09/05/2017    GERD (gastroesophageal reflux disease)     Hyperlipidemia     Hypertension     Impaired fasting blood sugar     Kidney stones 11/10    Lumbar spondylosis     Mild CAD 10/14/2019    Dr. Galina Orozco, [address] [place] [address]    Mild CAD 11/13/2019    OAB (overactive bladder)     AUDIE (obstructive sleep apnea) 6/4/2013    Osteoarthritis     Osteoarthritis of right knee     Pneumonia     pneumonia    RBBB 12/19/2016    Renal cyst     Retinal vasculitis     Retinal vasculitis     Right hemiparesis ([facility] 7/25/2019    Right ureteral stone     Type 2 diabetes mellitus without complication, without long-term current use of insulin ([facility])     diet controlled    Type 2 diabetes mellitus without complication, without long-term current use of insulin ([address]      Past Surgical History:   Procedure Laterality Date    APPENDECTOMY      BONE RESECTION Left     bone spur removal left elbow CARDIAC CATHETERIZATION  3/23/2012    CATARACT REMOVAL WITH IMPLANT Left 2017    CATARACT REMOVAL WITH IMPLANT Right 10/2017    CHOLECYSTECTOMY, LAPAROSCOPIC  2017    COLONOSCOPY  2008    multiple    COLONOSCOPY N/A 1/15/2020    COLONOSCOPY POLYPECTOMY SNARE/COLD BIOPSY performed by Britt Stephenson MD at [facility]  2014    Retrograde Pyelogram, stent, Dr. Miriam Manning N/A 2018    CYSTOSCOPY, LEFT RETROGRADE PYELOGRAM, LEFT STENT PLACEMENT performed by Rehana Walker MD at [place] [address] Left 2019    CYSTOSCOPY LEFT URETEROSCOPY HOLMIUM LASER LITHOTRIPSY, STONE MANIPULATION WITH LEFT STENT EXCHANGE performed by Rehana Walker MD at [address] [place] [address]  3/28/2012    laparoscopic    TONSILLECTOMY      VENTRICULAR ABLATION SURGERY  2020     Family History   Problem Relation Age of Onset    Alzheimer's Disease Mother     Leukemia Father     Other Sister         pyloric valve repacement    Diabetes Brother     Sleep Apnea Brother      Social History     Socioeconomic History    Marital status:      Spouse name: Chioma March    Number of children: 3    Years of education: 12    Highest education level: Not on file   Occupational History    Not on file   Tobacco Use    Smoking status: Former     Packs/day: 1.00     Years: 16.00     Pack years: 16.00     Types: Cigarettes     Quit date: 1977     Years since quittin.5    Smokeless tobacco: Never   Vaping Use    Vaping Use: Never used   Substance and Sexual Activity    Alcohol use:  Yes     Alcohol/week: 4.0 standard drinks     Types: 1 Glasses of wine, 1 Cans of beer, 2 Standard drinks or equivalent per week    Drug use: Never    Sexual activity: Yes     Partners: Female   Other Topics Concern    Not on file   Social History Narrative    Not on file     Social Determinants of Health     Financial Resource Strain: Low Risk Difficulty of Paying Living Expenses: Not hard at all   Food Insecurity: No Food Insecurity    Worried About Running Out of Food in the Last Year: Never true    Ran Out of Food in the Last Year: Never true   Transportation Needs: Not on file   Physical Activity: Not on file   Stress: Not on file   Social Connections: Not on file   Intimate Partner Violence: Not on file   Housing Stability: Not on file       Review of Systems:   Constitutional: there has been no unanticipated weight loss. There's been no change in energy level, sleep pattern, or activity level. Eyes: No visual changes or diplopia. No scleral icterus. ENT: No Headaches, hearing loss or vertigo. No mouth sores or sore throat. Cardiovascular: Reviewed in HPI  Respiratory: No cough or wheezing, no sputum production. No hematemesis. Gastrointestinal: No abdominal pain, appetite loss, blood in stools. No change in bowel or bladder habits. Genitourinary: No dysuria, trouble voiding, or hematuria. Musculoskeletal:  No gait disturbance, weakness or joint complaints. Integumentary: No rash or pruritis. Neurological: No headache, diplopia, change in muscle strength, numbness or tingling. No change in gait, balance, coordination, mood, affect, memory, mentation, behavior. Psychiatric: No anxiety, no depression. Endocrine: No malaise, fatigue or temperature intolerance. No excessive thirst, fluid intake, or urination. No tremor. Hematologic/Lymphatic: No abnormal bruising or bleeding, blood clots or swollen lymph nodes. Allergic/Immunologic: No nasal congestion or hives. Physical Examination:    Vitals:    11/18/22 1203   BP: 104/66   Pulse: 61   SpO2: 96%   Weight: 247 lb (112 kg)   Height: 5' 8.5\" (1.74 m)       Body mass index is 37.01 kg/m².      Wt Readings from Last 3 Encounters:   11/18/22 247 lb (112 kg)   10/10/22 237 lb 12.8 oz (107.9 kg)   06/16/22 239 lb (108.4 kg)     BP Readings from Last 3 Encounters:   11/18/22 104/66   10/10/22 102/70   06/16/22 112/80     Constitutional and General Appearance:  Appears stated age. WD/WN in NAD, mildly obese  HEENT:  NC/AT  NUBIA  No problems with hearing  Skin:  Warm, dry  Respiratory:  Normal excursion and expansion without use of accessory muscles  Resp Auscultation: Normal breath sounds without dullness  Cardiovascular: The apical impulses not displaced  Heart tones are crisp and normal  Cervical veins are not engorged  The carotid upstroke is normal in amplitude and contour without delay or bruit  JVP less than 8 cm H2O  RRR with nl S1 and S2 without m,r,g  Peripheral pulses are symmetrical and full  There is no clubbing, cyanosis of the extremities. No edema  Femoral Arteries: 2+ and equal  Pedal Pulses: 2+ and equal   Neck:  No thyromegaly  Abdomen:  No masses or tenderness  Liver/Spleen: No Abnormalities Noted  Neurological/Psychiatric:  Alert and oriented in all spheres  Moves all extremities well  Exhibits normal gait balance and coordination  No abnormalities of mood, affect, memory, mentation, or behavior are noted    Echo 6/18/21  Technically difficult study due to body habitus.   -Abnormal arrhythmia noted.   -Left ventricular cavity size is normal.   -Ejection fraction is visually estimated to be 55%. -There is abnormal septal motion.   -Wall motion abnormalities difficult to determine due to abnormal   arrhythmia.   -Grade I diastolic dysfunction with normal LV filling pressures. E/e'=   14.9.   -Left atrium is dilated. -Right atrium is dilated. -The aortic valve appears sclerotic but opens well. -Mitral annular calcification is present. -Mild mitral regurgitation.   -Trivial tricuspid regurgitation. RVSP is estimated to be 21-24 mmHG. -IVC not well visualized.     Procedure performed: PVC Ablation per Dr. Martinez [place] [address]-  Indications for procedure:    Maureen Briones is 66 y.o. male with frequent symptomatic PVC  Electrophysiological study with ablation of left  Outflow PVCs  Attempted induction of arrhythmia after IV drug infusion. Three-dimensional electroanatomic mapping of the left ventricle   Intracardiac ultrasound   Left atrial recording and mapping via coronary sinus     ECG 2/20/20 Sinus Rhythm with frequent PVC's  QTcH 441     MCOT  8/22-9/20/19  Sinus rhythm with frequent PVC's  PVC burden 25%  PAC burden 3%  High   Low HR 42  Average HR 69     Echo 7/25/19  Summary   -Arrhythmia noted. -Normal left ventricle size and systolic function with an estimated ejection   fraction of 55%. There is concentric left ventricular hypertrophy. Grade I   diastolic dysfunction with elevated LV filling pressures. -Mitral annular calcification is present. -Mild mitral regurgitation.   -Aortic valve appears sclerotic but opens adequately. -Mild tricuspid regurgitation with PASP of 34 mmHg     Nuclear stress  10/4/19  Summary   No EKG evidence for ischemia with lexiscan   Normal LV size and systolic function. Myocardial perfusion imaging with small area of decreased uptake in the   inferoapical wall with stress and rest consistent with scar in the territory   typical of the RCA, Cx or LAD arteries. No significant reversible ischemia     Cath 10/14/19   LM       Normal              LAD     20% mid              Cx        Normal              RCA     Normal, nondominant              LVG     Not performed              EDP     12 mmHg    Labs were reviewed including labs from other hospital systems through [facility]. Cardiac testing was reviewed including echos, nuclear scans, cardiac catheterization, including from other hospital systems through [facility]. Assessment:   1. Chronic diastolic heart failure ([address]    2. PAF (paroxysmal atrial fibrillation) ([address]    3. FALCON (dyspnea on exertion)    4. SOB (shortness of breath)    5. Hyperlipidemia, unspecified hyperlipidemia type       1. Chronic diastolic heart failure: Stable.  Compensated by exam.  -Pro-BNP 4/28/21> 216; 10/25/21> 85;  6/6/22> 248  -Continue HCTZ       2. Premature ventricular contractions, h/o:  No PVC's per most recent EKG. -Asymptomatic prior to ablation per pt. 3. Mild CAD:  Stable, no anginal symptoms  -Very mild by cath 10/2019 (20% in LAD)   -Continues on baby asa & statin. 4. Benign essential HTN: /66   Pulse 61   Ht 5' 8.5\" (1.74 m)   Wt 247 lb (112 kg)   SpO2 96%   BMI 37.01 kg/m²   -Low, some off balance at times but no dizziness  -Stop amlodipine 5mg but can restart at 2.5mg if needed for rising B/P  -Continue losartan 100mg qd & HCTZ 25mg MWThFSu     5. Hyperlipidemia: Stable on Pravachol 80mg & Zetia 10mg  -7/3/2020> , , HDL 38, LDL 39  -4/28/21>  ,  , HDL 40, LDL 57  -6/6/22> , 207, HDL 43, LDL 63     6. AUDIE: Uses C-pap. He is compliant. 7. PAF:  HR regular & controlled. Continues on Eliquis. He has an ILR with regular interrogations. Historically shown some episodes of AF, low HR 54.     8. TIA/cryptogenic stroke (7/2019 & 10/2019): F/w neurology  -Taking baby asa  -Has ILR (10/2019)        PLAN:  1. Stop amlodipine 5mg qd but can restart at 2.5mg qd if B/P rises  2. Labs soon> CMP, BNP, CBC, lipids  3. RTO in 6 months with an ECHO same day      Time Based Itemization  A total of 30 minutes was spent on today's patient encounter. If applicable, non-patient-facing activities:  ( x)Preparing to see the patient and reviewing records  (x ) Individual interpretation of results  ( ) Discussion or coordination of care with other health care professionals  ( x) Ordering of unique tests, medications, or procedures  ( x) Documentation within the EHR        I appreciate the opportunity of cooperating in the care of this patient. Solomon Ludwig M.D., [address] attestation: This note was scribed in the presence of Dr. Jazmin Dow MD, by Abby Evans RN.     The scribe's documentation has been prepared under my direction and personally reviewed by me in its entirety. I confirm that the note above accurately reflects all work, treatment, procedures, and medical decision making performed by me.

## 2022-11-18 ENCOUNTER — OFFICE VISIT (OUTPATIENT)
Dept: CARDIOLOGY CLINIC | Age: 77
End: 2022-11-18
Payer: MEDICARE

## 2022-11-18 VITALS
HEART RATE: 61 BPM | WEIGHT: 247 LBS | DIASTOLIC BLOOD PRESSURE: 66 MMHG | OXYGEN SATURATION: 96 % | HEIGHT: 69 IN | BODY MASS INDEX: 36.58 KG/M2 | SYSTOLIC BLOOD PRESSURE: 104 MMHG

## 2022-11-18 DIAGNOSIS — I50.32 CHRONIC DIASTOLIC HEART FAILURE (HCC): Primary | ICD-10-CM

## 2022-11-18 DIAGNOSIS — R06.09 DOE (DYSPNEA ON EXERTION): ICD-10-CM

## 2022-11-18 DIAGNOSIS — R06.02 SOB (SHORTNESS OF BREATH): ICD-10-CM

## 2022-11-18 DIAGNOSIS — E78.5 HYPERLIPIDEMIA, UNSPECIFIED HYPERLIPIDEMIA TYPE: ICD-10-CM

## 2022-11-18 DIAGNOSIS — I48.0 PAF (PAROXYSMAL ATRIAL FIBRILLATION) (HCC): ICD-10-CM

## 2022-11-18 PROCEDURE — 1123F ACP DISCUSS/DSCN MKR DOCD: CPT | Performed by: INTERNAL MEDICINE

## 2022-11-18 PROCEDURE — G8427 DOCREV CUR MEDS BY ELIG CLIN: HCPCS | Performed by: INTERNAL MEDICINE

## 2022-11-18 PROCEDURE — 3074F SYST BP LT 130 MM HG: CPT | Performed by: INTERNAL MEDICINE

## 2022-11-18 PROCEDURE — G8484 FLU IMMUNIZE NO ADMIN: HCPCS | Performed by: INTERNAL MEDICINE

## 2022-11-18 PROCEDURE — 3078F DIAST BP <80 MM HG: CPT | Performed by: INTERNAL MEDICINE

## 2022-11-18 PROCEDURE — 1036F TOBACCO NON-USER: CPT | Performed by: INTERNAL MEDICINE

## 2022-11-18 PROCEDURE — G8417 CALC BMI ABV UP PARAM F/U: HCPCS | Performed by: INTERNAL MEDICINE

## 2022-11-18 PROCEDURE — 99214 OFFICE O/P EST MOD 30 MIN: CPT | Performed by: INTERNAL MEDICINE

## 2022-11-18 RX ORDER — HYDROCHLOROTHIAZIDE 25 MG/1
TABLET ORAL
Qty: 65 TABLET | Refills: 3 | Status: SHIPPED | OUTPATIENT
Start: 2022-11-18

## 2022-11-18 NOTE — PATIENT INSTRUCTIONS
Stop amlodipine 5mg. But if blood pressure starts to rise, restart it at 2.5mg every morning. Fasting lab work soon.

## 2022-11-21 ENCOUNTER — TELEPHONE (OUTPATIENT)
Dept: INTERNAL MEDICINE CLINIC | Age: 77
End: 2022-11-21

## 2022-11-21 DIAGNOSIS — E11.9 TYPE 2 DIABETES MELLITUS WITHOUT COMPLICATION, WITHOUT LONG-TERM CURRENT USE OF INSULIN (HCC): Primary | ICD-10-CM

## 2022-11-21 RX ORDER — BLOOD SUGAR DIAGNOSTIC
1 STRIP MISCELLANEOUS DAILY
Qty: 300 EACH | Refills: 0 | Status: SHIPPED | OUTPATIENT
Start: 2022-11-21

## 2022-11-21 NOTE — TELEPHONE ENCOUNTER
Pt calling in glucose blood strips and lancets, script sent over to [facility] [address] [place] [address] - [id number] [place] [address] [phone] - F [phone]     Last ov : 6/16/22  Next ov : 12/16/22

## 2022-11-28 ENCOUNTER — TELEPHONE (OUTPATIENT)
Dept: PULMONOLOGY | Age: 77
End: 2022-11-28

## 2022-11-28 NOTE — TELEPHONE ENCOUNTER
I called the patient back - he said he just received his recall replacement on Friday (it is identical to his old one he says.) The problem he is having is that it beeps when he breathes in and out and it is keeping him awake.

## 2022-11-28 NOTE — TELEPHONE ENCOUNTER
It is not a usual function for the machine to \"beep. \" Is he noticing mask leak or is the humidifier door flexing with inhale and exhale? I would encourage him to take to his DME to have them assess.

## 2022-11-29 NOTE — TELEPHONE ENCOUNTER
Patient called back and stated he will take his machine to [facility] to see what is going on. Patient to call back if he needs anything else.

## 2022-12-05 ENCOUNTER — NURSE ONLY (OUTPATIENT)
Dept: CARDIOLOGY CLINIC | Age: 77
End: 2022-12-05
Payer: MEDICARE

## 2022-12-05 DIAGNOSIS — G45.9 TIA (TRANSIENT ISCHEMIC ATTACK): Primary | ICD-10-CM

## 2022-12-05 DIAGNOSIS — Z45.09 ENCOUNTER FOR ELECTRONIC ANALYSIS OF REVEAL EVENT RECORDER: ICD-10-CM

## 2022-12-05 DIAGNOSIS — I48.0 PAROXYSMAL ATRIAL FIBRILLATION (HCC): ICD-10-CM

## 2022-12-05 PROCEDURE — 93298 REM INTERROG DEV EVAL SCRMS: CPT | Performed by: INTERNAL MEDICINE

## 2022-12-05 PROCEDURE — G2066 INTER DEVC REMOTE 30D: HCPCS | Performed by: INTERNAL MEDICINE

## 2022-12-05 NOTE — PROGRESS NOTES
We received a remote transmission from patient's monitor at home. Remote Linq report shows AF, SVT, SR with ectopy and nocturnal gallito recordings. Pt is on Eliquis. EP physician to review. We will continue to monitor remotely. Implanted for stroke. End of 31-day monitoring period 12-5-22.

## 2022-12-14 DIAGNOSIS — I50.32 CHRONIC DIASTOLIC HEART FAILURE (HCC): ICD-10-CM

## 2022-12-14 DIAGNOSIS — R06.02 SOB (SHORTNESS OF BREATH): ICD-10-CM

## 2022-12-14 DIAGNOSIS — I48.0 PAF (PAROXYSMAL ATRIAL FIBRILLATION) (HCC): ICD-10-CM

## 2022-12-14 DIAGNOSIS — E78.5 HYPERLIPIDEMIA, UNSPECIFIED HYPERLIPIDEMIA TYPE: ICD-10-CM

## 2022-12-14 LAB
A/G RATIO: 1.7 (ref 1.1–2.2)
ALBUMIN SERPL-MCNC: 4.3 G/DL (ref 3.4–5)
ALP BLD-CCNC: 87 U/L (ref 40–129)
ALT SERPL-CCNC: 18 U/L (ref 10–40)
ANION GAP SERPL CALCULATED.3IONS-SCNC: 14 MMOL/L (ref 3–16)
AST SERPL-CCNC: 17 U/L (ref 15–37)
BASOPHILS ABSOLUTE: 0.1 K/UL (ref 0–0.2)
BASOPHILS RELATIVE PERCENT: 1.2 %
BILIRUB SERPL-MCNC: 0.6 MG/DL (ref 0–1)
BUN BLDV-MCNC: 20 MG/DL (ref 7–20)
CALCIUM SERPL-MCNC: 9.8 MG/DL (ref 8.3–10.6)
CHLORIDE BLD-SCNC: 105 MMOL/L (ref 99–110)
CHOLESTEROL, FASTING: 128 MG/DL (ref 0–199)
CO2: 23 MMOL/L (ref 21–32)
CREAT SERPL-MCNC: 1.1 MG/DL (ref 0.8–1.3)
EOSINOPHILS ABSOLUTE: 0.4 K/UL (ref 0–0.6)
EOSINOPHILS RELATIVE PERCENT: 5 %
GFR SERPL CREATININE-BSD FRML MDRD: >60 ML/MIN/{1.73_M2}
GLUCOSE BLD-MCNC: 142 MG/DL (ref 70–99)
HCT VFR BLD CALC: 44.8 % (ref 40.5–52.5)
HDLC SERPL-MCNC: 41 MG/DL (ref 40–60)
HEMOGLOBIN: 14.7 G/DL (ref 13.5–17.5)
LDL CHOLESTEROL CALCULATED: 56 MG/DL
LYMPHOCYTES ABSOLUTE: 2.1 K/UL (ref 1–5.1)
LYMPHOCYTES RELATIVE PERCENT: 27.7 %
MCH RBC QN AUTO: 31.5 PG (ref 26–34)
MCHC RBC AUTO-ENTMCNC: 32.8 G/DL (ref 31–36)
MCV RBC AUTO: 96.2 FL (ref 80–100)
MONOCYTES ABSOLUTE: 0.5 K/UL (ref 0–1.3)
MONOCYTES RELATIVE PERCENT: 6.9 %
NEUTROPHILS ABSOLUTE: 4.4 K/UL (ref 1.7–7.7)
NEUTROPHILS RELATIVE PERCENT: 59.2 %
PDW BLD-RTO: 13.5 % (ref 12.4–15.4)
PLATELET # BLD: 201 K/UL (ref 135–450)
PMV BLD AUTO: 11.5 FL (ref 5–10.5)
POTASSIUM SERPL-SCNC: 4.4 MMOL/L (ref 3.5–5.1)
PRO-BNP: 96 PG/ML (ref 0–449)
RBC # BLD: 4.66 M/UL (ref 4.2–5.9)
SODIUM BLD-SCNC: 142 MMOL/L (ref 136–145)
TOTAL PROTEIN: 6.9 G/DL (ref 6.4–8.2)
TRIGLYCERIDE, FASTING: 157 MG/DL (ref 0–150)
VLDLC SERPL CALC-MCNC: 31 MG/DL
WBC # BLD: 7.5 K/UL (ref 4–11)

## 2022-12-16 ENCOUNTER — OFFICE VISIT (OUTPATIENT)
Dept: INTERNAL MEDICINE CLINIC | Age: 77
End: 2022-12-16

## 2022-12-16 VITALS
BODY MASS INDEX: 35.4 KG/M2 | HEART RATE: 55 BPM | OXYGEN SATURATION: 97 % | WEIGHT: 239 LBS | HEIGHT: 69 IN | SYSTOLIC BLOOD PRESSURE: 110 MMHG | DIASTOLIC BLOOD PRESSURE: 60 MMHG

## 2022-12-16 DIAGNOSIS — Z00.00 MEDICARE ANNUAL WELLNESS VISIT, SUBSEQUENT: Primary | ICD-10-CM

## 2022-12-16 DIAGNOSIS — K21.9 GASTROESOPHAGEAL REFLUX DISEASE WITHOUT ESOPHAGITIS: Chronic | ICD-10-CM

## 2022-12-16 DIAGNOSIS — E11.9 TYPE 2 DIABETES MELLITUS WITHOUT COMPLICATION, WITHOUT LONG-TERM CURRENT USE OF INSULIN (HCC): Chronic | ICD-10-CM

## 2022-12-16 DIAGNOSIS — E78.2 MIXED HYPERLIPIDEMIA: ICD-10-CM

## 2022-12-16 PROBLEM — E11.22 TYPE 2 DIABETES MELLITUS WITH CHRONIC KIDNEY DISEASE (HCC): Status: ACTIVE | Noted: 2022-12-16

## 2022-12-16 RX ORDER — BLOOD SUGAR DIAGNOSTIC
STRIP MISCELLANEOUS
COMMUNITY
Start: 2022-11-21 | End: 2022-12-16 | Stop reason: SDUPTHER

## 2022-12-16 RX ORDER — EZETIMIBE 10 MG/1
10 TABLET ORAL DAILY
Qty: 90 TABLET | Refills: 1 | Status: SHIPPED | OUTPATIENT
Start: 2022-12-16

## 2022-12-16 RX ORDER — OMEPRAZOLE 40 MG/1
40 CAPSULE, DELAYED RELEASE ORAL DAILY
Qty: 90 CAPSULE | Refills: 1 | Status: SHIPPED | OUTPATIENT
Start: 2022-12-16

## 2022-12-16 RX ORDER — PRAVASTATIN SODIUM 80 MG/1
80 TABLET ORAL EVERY EVENING
Qty: 90 TABLET | Refills: 1 | Status: SHIPPED | OUTPATIENT
Start: 2022-12-16 | End: 2023-12-16

## 2022-12-16 ASSESSMENT — PATIENT HEALTH QUESTIONNAIRE - PHQ9
SUM OF ALL RESPONSES TO PHQ QUESTIONS 1-9: 0
SUM OF ALL RESPONSES TO PHQ9 QUESTIONS 1 & 2: 0
2. FEELING DOWN, DEPRESSED OR HOPELESS: 0
SUM OF ALL RESPONSES TO PHQ QUESTIONS 1-9: 0
1. LITTLE INTEREST OR PLEASURE IN DOING THINGS: 0

## 2022-12-16 ASSESSMENT — ENCOUNTER SYMPTOMS
WHEEZING: 0
VOMITING: 0
COUGH: 0
CONSTIPATION: 0
BACK PAIN: 0
SORE THROAT: 0
SHORTNESS OF BREATH: 0
ABDOMINAL PAIN: 0
CHEST TIGHTNESS: 0
NAUSEA: 0
COLOR CHANGE: 0

## 2022-12-16 ASSESSMENT — LIFESTYLE VARIABLES
HAS A RELATIVE, FRIEND, DOCTOR, OR ANOTHER HEALTH PROFESSIONAL EXPRESSED CONCERN ABOUT YOUR DRINKING OR SUGGESTED YOU CUT DOWN: 0
HOW OFTEN DO YOU HAVE A DRINK CONTAINING ALCOHOL: 2-3 TIMES A WEEK
HOW OFTEN DURING THE LAST YEAR HAVE YOU BEEN UNABLE TO REMEMBER WHAT HAPPENED THE NIGHT BEFORE BECAUSE YOU HAD BEEN DRINKING: 0
HOW MANY STANDARD DRINKS CONTAINING ALCOHOL DO YOU HAVE ON A TYPICAL DAY: 1 OR 2
HOW OFTEN DURING THE LAST YEAR HAVE YOU FAILED TO DO WHAT WAS NORMALLY EXPECTED FROM YOU BECAUSE OF DRINKING: 0
HOW OFTEN DURING THE LAST YEAR HAVE YOU NEEDED AN ALCOHOLIC DRINK FIRST THING IN THE MORNING TO GET YOURSELF GOING AFTER A NIGHT OF HEAVY DRINKING: 0
HOW OFTEN DURING THE LAST YEAR HAVE YOU FOUND THAT YOU WERE NOT ABLE TO STOP DRINKING ONCE YOU HAD STARTED: 0
HOW OFTEN DURING THE LAST YEAR HAVE YOU HAD A FEELING OF GUILT OR REMORSE AFTER DRINKING: 0
HAVE YOU OR SOMEONE ELSE BEEN INJURED AS A RESULT OF YOUR DRINKING: 0

## 2022-12-16 NOTE — PROGRESS NOTES
Medicare Annual Wellness Visit  Name: Luz Marina Weiss Date: 2022   MRN: [id number] Sex: Male   Age: 76 y.o. Ethnicity: Non- / Non    : 1945 Race: White (non-)      Mann Pereira is here for Medicare AWV     Screenings for behavioral, psychosocial and functional/safety risks, and cognitive dysfunction are all negative except as indicated below. These results, as well as other patient data from the [address] form, are documented in Flowsheets linked to this Encounter. No Known Allergies    Prior to Visit Medications    Medication Sig Taking?  Authorizing Provider   apixaban (ELIQUIS) 5 MG TABS tablet Take 1 tablet by mouth 2 times daily Yes FAHAD Mclaughlin CNP   pravastatin (PRAVACHOL) 80 MG tablet Take 1 tablet by mouth every evening Yes Sandy Khalil MD   omeprazole (PRILOSEC) 40 MG delayed release capsule Take 1 capsule by mouth daily Yes Sandy Khalil MD   ezetimibe (ZETIA) 10 MG tablet Take 1 tablet by mouth daily Yes Sandy Khalil MD   hydroCHLOROthiazide (HYDRODIURIL) 25 MG tablet Take 1 tablet 5 days weekly on Monday, Wednesday, Thursday, Friday and  Yes Judy Gamboa MD   Glucosamine-Chondroitin 1997-6860 MG/30ML LIQD Take 1.5 oz by mouth daily Yes Historical Provider, MD   Vitamin D (CHOLECALCIFEROL) 25 MCG (1000 UT) TABS tablet Take 1,000 Units by mouth daily Yes Historical Provider, MD   vitamin E 400 UNIT capsule Take 400 Units by mouth daily Yes Historical Provider, MD   losartan (COZAAR) 100 MG tablet Take 1 tablet by mouth daily Yes Judy Gamboa MD   Ascorbic Acid (VITAMIN C PO) Take 1,000 mg by mouth daily  Yes Historical Provider, MD   tamsulosin (FLOMAX) 0.4 MG capsule Take 1 capsule by mouth daily Yes Scotty Stewart DO   mometasone (NASONEX) 50 MCG/ACT nasal spray 2 sprays by Each Nostril route daily  Patient taking differently: 2 sprays by Each Nostril route daily as needed Yes Fabian Pinto DO   b complex vitamins capsule Take 1 capsule by mouth daily Yes Historical Provider, MD   solifenacin (VESICARE) 10 MG tablet Take 10 mg by mouth daily  Yes Historical Provider, MD   aspirin 81 MG tablet Take 81 mg by mouth daily Yes Historical Provider, MD   blood glucose test strips (GLUCOSE METER TEST) strip 1 each by In Vitro route daily  Bhavesh Harvey MD   Lancets MISC 1 each by Does not apply route daily  John Stevens DO       Past Medical History:   Diagnosis Date    BPH     Colon polyps     DDD (degenerative disc disease), lumbar     Diverticulosis     Gallstone pancreatitis 09/05/2017    GERD (gastroesophageal reflux disease)     Hyperlipidemia     Hypertension     Impaired fasting blood sugar     Kidney stones 11/10    Lumbar spondylosis     Mild CAD 10/14/2019    Dr. Alejandra Cota, [address] [place] [address]    Mild CAD 11/13/2019    OAB (overactive bladder)     AUDIE (obstructive sleep apnea) 6/4/2013    Osteoarthritis     Osteoarthritis of right knee     Pneumonia     pneumonia    RBBB 12/19/2016    Renal cyst     Retinal vasculitis     Retinal vasculitis     Right hemiparesis ([address] 7/25/2019    Right ureteral stone     Type 2 diabetes mellitus without complication, without long-term current use of insulin ([facility])     diet controlled    Type 2 diabetes mellitus without complication, without long-term current use of insulin ([address]      Past Surgical History:   Procedure Laterality Date    APPENDECTOMY      BONE RESECTION Left     bone spur removal left elbow    CARDIAC CATHETERIZATION  3/23/2012    CATARACT REMOVAL WITH IMPLANT Left 11/01/2017    CATARACT REMOVAL WITH IMPLANT Right 10/2017    CHOLECYSTECTOMY, LAPAROSCOPIC  09/07/2017    COLONOSCOPY  7/2008    multiple    COLONOSCOPY N/A 1/15/2020    COLONOSCOPY POLYPECTOMY SNARE/COLD BIOPSY performed by Tristen Harvey MD at [address] [place] [address]  12/2/2014    Retrograde Pyelogram, stent, Dr. Jaron Santoyo N/A 12/13/2018    CYSTOSCOPY, LEFT RETROGRADE PYELOGRAM, LEFT STENT PLACEMENT performed by Ashly Rogers MD at [address] Left 1/2/2019    CYSTOSCOPY LEFT URETEROSCOPY HOLMIUM LASER LITHOTRIPSY, STONE MANIPULATION WITH LEFT STENT EXCHANGE performed by Ashly Rogers MD at [address] [place] [address]  3/28/2012    laparoscopic    TONSILLECTOMY      VENTRICULAR ABLATION SURGERY  09/22/2020       Family History   Problem Relation Age of Onset    Alzheimer's Disease Mother     Leukemia Father     Other Sister         pyloric valve repacement    Diabetes Brother     Sleep Apnea Brother        CareTeam (Including outside providers/suppliers regularly involved in providing care):   Patient Care Team:  Princess Navarro MD as PCP - General (Internal Medicine)  Princess Navarro MD as PCP - [facility]  Vane Lugo MD as Consulting Physician (Cardiology)  Flo Hardin MD as Consulting Physician (Urology)  Sujit Arreola MD as Consulting Physician (Ophthalmology)  Harleen Romero MD as Consulting Physician (General Surgery)  Ashly Rogers MD as Consulting Physician (Urology)  Josselyn Rodriguez DPM as Consulting Physician (Podiatry)  Thong Anthony MD as Consulting Physician (Sleep Medicine Family Practice)  FAHAD Ulrich - CNP (Nurse Practitioner)  Leana Bermudez MD as Consulting Physician (Electrophysiology)  Desmond Higginbotham MD as Consulting Physician (Pulmonology)    Wt Readings from Last 3 Encounters:   12/16/22 239 lb (108.4 kg)   11/18/22 247 lb (112 kg)   10/10/22 237 lb 12.8 oz (107.9 kg)     Vitals:    12/16/22 1257   BP: 110/60   Pulse: 55   SpO2: 97%   Weight: 239 lb (108.4 kg)   Height: 5' 8.5\" (1.74 m)     Body mass index is 35.81 kg/m². Based upon direct observation of the patient, evaluation of cognition reveals recent and remote memory intact. Review of Systems   Constitutional:  Negative for activity change, appetite change, fatigue and unexpected weight change. HENT:  Negative for congestion, hearing loss, mouth sores and sore throat. Eyes:  Negative for visual disturbance. Respiratory:  Negative for cough, chest tightness, shortness of breath and wheezing. Cardiovascular:  Negative for chest pain, palpitations and leg swelling. Gastrointestinal:  Negative for abdominal pain, constipation, nausea and vomiting. Endocrine: Negative for cold intolerance and heat intolerance. Genitourinary:  Positive for frequency and urgency. Negative for difficulty urinating, dysuria and hematuria. Musculoskeletal:  Negative for arthralgias, back pain, gait problem and joint swelling. Skin:  Negative for color change and wound. Allergic/Immunologic: Negative for environmental allergies and immunocompromised state. Neurological:  Negative for dizziness, speech difficulty, weakness, light-headedness and headaches. Hematological:  Bruises/bleeds easily. Psychiatric/Behavioral:  Negative for behavioral problems and dysphoric mood. Physical Exam  Constitutional:       General: He is not in acute distress. Appearance: Normal appearance. He is obese. He is not toxic-appearing. HENT:      Head: Normocephalic. Mouth/Throat:      Mouth: Mucous membranes are moist.      Pharynx: Oropharynx is clear. No oropharyngeal exudate. Eyes:      Conjunctiva/sclera: Conjunctivae normal.   Cardiovascular:      Rate and Rhythm: Normal rate and regular rhythm. Heart sounds: Normal heart sounds. No murmur heard. Pulmonary:      Effort: Pulmonary effort is normal. No respiratory distress. Breath sounds: No wheezing. Abdominal:      Palpations: Abdomen is soft. Tenderness: There is no abdominal tenderness. Musculoskeletal:         General: Normal range of motion. Cervical back: Neck supple. Skin:     General: Skin is warm and dry. Neurological:      General: No focal deficit present. Mental Status: He is alert. Cranial Nerves:  No cranial nerve deficit. Psychiatric:         Mood and Affect: Mood normal.        Patient's complete Health Risk Assessment and screening values have been reviewed and are found in Flowsheets. The following problems were reviewed today and where indicated follow up appointments were made and/or referrals ordered.     Positive Risk Factor Screenings with Interventions:           Weight and Activity:  Physical Activity: Insufficiently Active    Days of Exercise per Week: 2 days    Minutes of Exercise per Session: 60 min     On average, how many days per week do you engage in moderate to strenuous exercise (like a brisk walk)?: 2 days  Have you lost any weight without trying in the past 3 months?: No  Body mass index: (!) 35.81  Health Habits/Nutrition Interventions:  Encourged to add cardio to current exercise     Safety:  Do you have any tripping hazards - clutter in doorways, halls, or stairs?: (!) Yes  Interventions:  Remove area rugs and clutter  See AVS for additional education material  See A/P for plan and any pertinent orders       Personalized Preventive Plan   Current Health Maintenance Status  Immunization History   Administered Date(s) Administered    COVID-19, MODERNA BLUE border, Primary or Immunocompromised, (age 12y+), IM, 100 mcg/0.5mL 04/21/2021    COVID-19, PFIZER PURPLE top, DILUTE for use, (age 15 y+), 30mcg/0.3mL 02/01/2021, 02/22/2021, 10/25/2021    Influenza Virus Vaccine 10/18/2012, 10/06/2014, 10/20/2021    Influenza Whole 10/14/2011, 10/06/2014    Influenza, FLUAD, (age 72 y+), Adjuvanted, 0.5mL 10/19/2020, 10/20/2020, 10/13/2021, 10/17/2022    Influenza, FLUZONE (age 72 y+), High Dose, 0.7mL 10/30/2020    Influenza, High Dose (Fluzone 65 yrs and older) 09/22/2015, 09/05/2017, 10/26/2018, 10/14/2019, 10/19/2020    Influenza, Triv, inactivated, subunit, adjuvanted, IM (Fluad 65 yrs and older) 10/26/2018, 10/15/2019    Pneumococcal Conjugate 13-valent (Cmexigt16) 06/19/2015    Pneumococcal Polysaccharide (Ljewiamrm62) 12/14/2011, 05/18/2015    Td vaccine (adult) 05/04/2016    Tdap (Boostrix, Adacel) 10/02/2013    Tetanus 02/14/2005      Health Maintenance   Topic Date Due    Shingles vaccine (1 of 2) Never done    Diabetic foot exam  07/21/2021    COVID-19 Vaccine (5 - Booster for Pfizer series) 12/20/2021    A1C test (Diabetic or Prediabetic)  12/22/2022    Diabetic retinal exam  08/19/2023    Lipids  12/14/2023    Depression Screen  12/16/2023    Annual Wellness Visit (AWV)  12/17/2023    DTaP/Tdap/Td vaccine (3 - Td or Tdap) 05/04/2026    Flu vaccine  Completed    Pneumococcal 65+ years Vaccine  Completed    Hepatitis C screen  Completed    Hepatitis A vaccine  Aged Out    Hib vaccine  Aged Out    Meningococcal (ACWY) vaccine  Aged Out     Recommendations for [employer] Due: see orders and patient instructions/AVS.    1. Medicare annual wellness visit, subsequent  2. Mixed hyperlipidemia  -     pravastatin (PRAVACHOL) 80 MG tablet; Take 1 tablet by mouth every evening, Disp-90 tablet, R-1Normal  -     ezetimibe (ZETIA) 10 MG tablet; Take 1 tablet by mouth daily, Disp-90 tablet, R-1Normal  3. Gastroesophageal reflux disease without esophagitis  -     omeprazole (PRILOSEC) 40 MG delayed release capsule; Take 1 capsule by mouth daily, Disp-90 capsule, R-1Normal  4. Type 2 diabetes mellitus without complication, without long-term current use of insulin ([facility]      Return in about 6 months (around 6/16/2023).     Recommended screening schedule for the next 5-10 years is provided to the patient in written form: see Patient Instructions/AVS.    Electronically signed by Erickson Hancock MD on 12/16/2022 at 1:20 PM.

## 2022-12-16 NOTE — TELEPHONE ENCOUNTER
Medication Refill    Medication needing refilled:  apixaban (ELIQUIS) 5 MG TABS tablet     Dosage of the medication:    How are you taking this medication (QD, BID, TID, QID, PRN): 2 a day    30 or 90 day supply called in: 90    When will you run out of your medication:    Which Pharmacy are we sending the medication to?:  [facility], [place] - [address] [place] [address] [phone] Milo [place] [phone]   [address] [place] [address] Salvatore Sacks [address]   Phone:  [phone]  Fax:  [phone]      Pt asking for call to let him know when it is called in so he can go pick it up.

## 2022-12-16 NOTE — TELEPHONE ENCOUNTER
Last OV: 11/18/22 heladio  Last Labs: 12/14/22 cmp, 6/6/22 cbc  Last refill: 11/23/21  Next appt:  5/6/23 heladio

## 2022-12-21 ENCOUNTER — TELEPHONE (OUTPATIENT)
Dept: CARDIOLOGY CLINIC | Age: 77
End: 2022-12-21

## 2022-12-21 NOTE — TELEPHONE ENCOUNTER
----- Message from Emeka Beckwith MD sent at 12/20/2022  9:34 PM EST -----  Please call patient and let him know that his labs look okay. No changes.   BERNA

## 2022-12-22 NOTE — TELEPHONE ENCOUNTER
Spoke to pt and told him the prescription refill for eliquis has been approved. So he can go there and pick it up. Pt v/u.

## 2023-01-09 ENCOUNTER — TELEPHONE (OUTPATIENT)
Dept: CARDIOLOGY CLINIC | Age: 78
End: 2023-01-09

## 2023-01-09 ENCOUNTER — NURSE ONLY (OUTPATIENT)
Dept: CARDIOLOGY CLINIC | Age: 78
End: 2023-01-09
Payer: MEDICARE

## 2023-01-09 DIAGNOSIS — Z45.09 ENCOUNTER FOR ELECTRONIC ANALYSIS OF REVEAL EVENT RECORDER: ICD-10-CM

## 2023-01-09 DIAGNOSIS — G45.9 TIA (TRANSIENT ISCHEMIC ATTACK): Primary | ICD-10-CM

## 2023-01-09 PROCEDURE — 93298 REM INTERROG DEV EVAL SCRMS: CPT | Performed by: INTERNAL MEDICINE

## 2023-01-09 PROCEDURE — G2066 INTER DEVC REMOTE 30D: HCPCS | Performed by: INTERNAL MEDICINE

## 2023-01-09 NOTE — PROGRESS NOTES
We received a remote transmission from patient's monitor at home. Remote Linq report shows AF and nocturnal gallito recording. Pt is on [employer]. EP physician to review. We will continue to monitor remotely. Implanted for stroke. End of 31-day monitoring period 1-9-23.

## 2023-01-09 NOTE — TELEPHONE ENCOUNTER
----- Message from Cherelle Moreira MD sent at 1/9/2023  1:54 PM EST -----  Hi    Needs f/u next available for Atrial fibrillation on Implantable Loop recorder

## 2023-01-15 PROBLEM — Z00.00 MEDICARE ANNUAL WELLNESS VISIT, SUBSEQUENT: Status: RESOLVED | Noted: 2019-10-21 | Resolved: 2023-01-15

## 2023-01-20 ENCOUNTER — OFFICE VISIT (OUTPATIENT)
Dept: PULMONOLOGY | Age: 78
End: 2023-01-20
Payer: MEDICARE

## 2023-01-20 VITALS
WEIGHT: 238 LBS | SYSTOLIC BLOOD PRESSURE: 127 MMHG | BODY MASS INDEX: 35.25 KG/M2 | HEART RATE: 71 BPM | DIASTOLIC BLOOD PRESSURE: 86 MMHG | OXYGEN SATURATION: 96 % | HEIGHT: 69 IN

## 2023-01-20 DIAGNOSIS — G47.33 OBSTRUCTIVE SLEEP APNEA SYNDROME: Primary | ICD-10-CM

## 2023-01-20 DIAGNOSIS — I25.10 MILD CAD: Chronic | ICD-10-CM

## 2023-01-20 DIAGNOSIS — I10 ESSENTIAL HYPERTENSION: ICD-10-CM

## 2023-01-20 DIAGNOSIS — I50.32 CHRONIC DIASTOLIC HEART FAILURE (HCC): Chronic | ICD-10-CM

## 2023-01-20 DIAGNOSIS — E66.01 SEVERE OBESITY (BMI 35.0-39.9) WITH COMORBIDITY (HCC): ICD-10-CM

## 2023-01-20 PROCEDURE — 1036F TOBACCO NON-USER: CPT | Performed by: NURSE PRACTITIONER

## 2023-01-20 PROCEDURE — G8417 CALC BMI ABV UP PARAM F/U: HCPCS | Performed by: NURSE PRACTITIONER

## 2023-01-20 PROCEDURE — 99214 OFFICE O/P EST MOD 30 MIN: CPT | Performed by: NURSE PRACTITIONER

## 2023-01-20 PROCEDURE — G8427 DOCREV CUR MEDS BY ELIG CLIN: HCPCS | Performed by: NURSE PRACTITIONER

## 2023-01-20 PROCEDURE — 1123F ACP DISCUSS/DSCN MKR DOCD: CPT | Performed by: NURSE PRACTITIONER

## 2023-01-20 PROCEDURE — 3079F DIAST BP 80-89 MM HG: CPT | Performed by: NURSE PRACTITIONER

## 2023-01-20 PROCEDURE — G8484 FLU IMMUNIZE NO ADMIN: HCPCS | Performed by: NURSE PRACTITIONER

## 2023-01-20 PROCEDURE — 3074F SYST BP LT 130 MM HG: CPT | Performed by: NURSE PRACTITIONER

## 2023-01-20 ASSESSMENT — SLEEP AND FATIGUE QUESTIONNAIRES
HOW LIKELY ARE YOU TO NOD OFF OR FALL ASLEEP WHILE SITTING AND TALKING TO SOMEONE: 0
HOW LIKELY ARE YOU TO NOD OFF OR FALL ASLEEP WHILE SITTING QUIETLY AFTER LUNCH WITHOUT ALCOHOL: 2
ESS TOTAL SCORE: 15
HOW LIKELY ARE YOU TO NOD OFF OR FALL ASLEEP WHILE SITTING AND READING: 2
HOW LIKELY ARE YOU TO NOD OFF OR FALL ASLEEP WHILE WATCHING TV: 3
HOW LIKELY ARE YOU TO NOD OFF OR FALL ASLEEP WHILE LYING DOWN TO REST IN THE AFTERNOON WHEN CIRCUMSTANCES PERMIT: 3
HOW LIKELY ARE YOU TO NOD OFF OR FALL ASLEEP IN A CAR, WHILE STOPPED FOR A FEW MINUTES IN TRAFFIC: 1
HOW LIKELY ARE YOU TO NOD OFF OR FALL ASLEEP WHILE SITTING INACTIVE IN A PUBLIC PLACE: 1
HOW LIKELY ARE YOU TO NOD OFF OR FALL ASLEEP WHEN YOU ARE A PASSENGER IN A CAR FOR AN HOUR WITHOUT A BREAK: 3

## 2023-01-20 NOTE — ASSESSMENT & PLAN NOTE
Chronic-with progression/exacerbation: Reviewed and analyzed results of physiologic download from patient's machine and reviewed with patient. Supplies and parts as needed for his machine. These are medically necessary. Limit caffeine use after 3pm. Based on the analyzed data will continue with current settings. Encouraged him to use machine all night. Discussed more consistent use of his machine will most likely decrease nocturia. Discussed trying a mask liner to control mask leak and skin irritation. Encouraged him to work with his DME on mask fit and comfort. Provided resources for him to view different styles of masks and mask liners. Will see him back in 3 months. Encouraged him to contact the office with any questions or concerns. Encouraged consistent use of his machine each night, all night. Discussed the importance of treating Obstructive sleep apnea from a physiological standpoint. Instructed not to drive unless had 4 hrs of effective therapy for his AUDIE the night before. Did review the risks of under or untreated AUDIE including, but not limited to, higher risks of motor vehicle accidents, stroke, heart attacks, and death. He understands and accepts all these risks.

## 2023-01-20 NOTE — PROGRESS NOTES
Butch Straussoise  [facility] [address]  [address]  [address], [address] [facility]- [phone]   [facility] [address] Siddharth Ibarra. [facility]. Arvind Madsen 37 [phone] [address] [facility]  [address] [place] [id number] [phone]      Assessment/Plan:      1. Obstructive sleep apnea syndrome  Assessment & Plan:  Chronic-with progression/exacerbation: Reviewed and analyzed results of physiologic download from patient's machine and reviewed with patient. Supplies and parts as needed for his machine. These are medically necessary. Limit caffeine use after 3pm. Based on the analyzed data will continue with current settings. Encouraged him to use machine all night. Discussed more consistent use of his machine will most likely decrease nocturia. Discussed trying a mask liner to control mask leak and skin irritation. Encouraged him to work with his DME on mask fit and comfort. Provided resources for him to view different styles of masks and mask liners. Will see him back in 3 months. Encouraged him to contact the office with any questions or concerns. Encouraged consistent use of his machine each night, all night. Discussed the importance of treating Obstructive sleep apnea from a physiological standpoint. Instructed not to drive unless had 4 hrs of effective therapy for his AUDIE the night before. Did review the risks of under or untreated AUDIE including, but not limited to, higher risks of motor vehicle accidents, stroke, heart attacks, and death. He understands and accepts all these risks. 2. Mild CAD  Assessment & Plan:  Chronic- Stable. Discussed the importance of treating obstructive sleep apnea as part of the management of this disorder. Cont any meds per PCP and other physicians. 3. Chronic diastolic heart failure (HCC)  Assessment & Plan:  Chronic- Stable.   Discussed the importance of treating obstructive sleep apnea as part of the management of this disorder. Cont any meds per PCP and other physicians. 4. Essential hypertension  Assessment & Plan:  Chronic- Stable. Discussed the importance of treating obstructive sleep apnea as part of the management of this disorder. Cont any meds per PCP and other physicians. 5. Severe obesity (BMI 35.0-39. 9) with comorbidity ([address]  Assessment & Plan:  Chronic-not stable:  Discussed importance of treating obstructive sleep apnea and getting sufficient sleep to assist with weight control. Encouraged him to work on weight loss through diet and exercise. Recommended DASH or Mediterranean diets. Reviewed, analyzed, and documented physiologic data from patient's PAP machine. This information was analyzed to assess complexity and medical decision making in regards to further testing and management. The primary encounter diagnosis was Obstructive sleep apnea syndrome. Diagnoses of Mild CAD, Chronic diastolic heart failure ([address] Essential hypertension, and Severe obesity (BMI 35.0-39. 9) with comorbidity ([address] were also pertinent to this visit. The chronic medical conditions listed are directly related to the primary diagnosis listed above. The management of the primary diagnosis affects the secondary diagnosis and vice versa. Subjective:   Subjective   Patient ID: Rebecca Diaz is a 76 y.o. male.     Chief Complaint   Patient presents with    Sleep Apnea       HPI:  Machine Modem/Download Info:  Compliance (hours/night): 3.2 hrs/night  % of nights >= 4 hrs: 33.3 %  Download AHI (/hour): 1.5 /HR   Average IPAP Pressure: 16 cmH2O  Average EPAP Pressure: 12 cmH2O         AUTO BIPAP - Settings (Srinivas)  IPAP Max: 25 cmH2O  EPAP Min: 12 cmH2O  Pressure Support Min: 3  Pressure Support Max: 7             Comfort Settings  Humidity Level (0-8): 5  Flex/EPR (0-3): 3 PAP Mask  Mask Type: Full Face mask     Rebecca Diaz presents today for follow-up for sleep apnea. he reports he he is doing well with his machine. He will usually not replace his mask after he returns from the bathroom. He is waking multiple times per night for the bathroom. He continues to struggle with mask leak and skin irritation. He tried a different style of mask but did not like it. The pressure on his machine is comfortable and he is waking rested. he denies headaches, congestion, nosebleeds, dryness, aerophagia, or drowsiness while driving. [facility]    [place] Sleepiness Score: 15    Social History     Socioeconomic History    Marital status:      Spouse name: Lona Hunter    Number of children: 3    Years of education: 16    Highest education level: Not on file   Occupational History    Not on file   Tobacco Use    Smoking status: Former     Packs/day: 1.00     Years: 16.00     Pack years: 16.00     Types: Cigarettes     Quit date: 1977     Years since quittin.7    Smokeless tobacco: Never   Vaping Use    Vaping Use: Never used   Substance and Sexual Activity    Alcohol use:  Yes     Alcohol/week: 4.0 standard drinks     Types: 1 Glasses of wine, 1 Cans of beer, 2 Standard drinks or equivalent per week    Drug use: Never    Sexual activity: Yes     Partners: Female   Other Topics Concern    Not on file   Social History Narrative    Not on file     Social Determinants of Health     Financial Resource Strain: Low Risk     Difficulty of Paying Living Expenses: Not hard at all   Food Insecurity: No Food Insecurity    Worried About Running Out of Food in the Last Year: Never true    Ran Out of Food in the Last Year: Never true   Transportation Needs: Not on file   Physical Activity: Insufficiently Active    Days of Exercise per Week: 2 days    Minutes of Exercise per Session: 60 min   Stress: Not on file   Social Connections: Not on file   Intimate Partner Violence: Not on file   Housing Stability: Not on file       Current Outpatient Medications   Medication Instructions    apixaban (ELIQUIS) 5 mg, Oral, 2 TIMES DAILY    Ascorbic Acid (VITAMIN C PO) 1,000 mg, Oral, DAILY    aspirin 81 mg, Oral, DAILY    b complex vitamins capsule 1 capsule, Oral, DAILY    blood glucose test strips (GLUCOSE METER TEST) strip 1 each, In Vitro, DAILY    ezetimibe (ZETIA) 10 mg, Oral, DAILY    Glucosamine-Chondroitin 4547-3352 MG/30ML LIQD 1.5 oz, Oral, DAILY    hydroCHLOROthiazide (HYDRODIURIL) 25 MG tablet Take 1 tablet 5 days weekly on Monday, Wednesday, Thursday, Friday and Sunday    Lancets MISC 1 each, Does not apply, DAILY    losartan (COZAAR) 100 mg, Oral, DAILY    mometasone (NASONEX) 50 MCG/ACT nasal spray 2 sprays, Each Nostril, DAILY    omeprazole (PRILOSEC) 40 mg, Oral, DAILY    pravastatin (PRAVACHOL) 80 mg, Oral, EVERY EVENING    solifenacin (VESICARE) 10 mg, Oral, DAILY    tamsulosin (FLOMAX) 0.4 mg, Oral, DAILY    Vitamin D (CHOLECALCIFEROL) 1,000 Units, Oral, DAILY    vitamin E 400 Units, Oral, DAILY          Vitals:  Weight BMI   Wt Readings from Last 3 Encounters:   01/20/23 238 lb (108 kg)   12/16/22 239 lb (108.4 kg)   11/18/22 247 lb (112 kg)    Body mass index is 35.15 kg/m².      BP HR SaO2   BP Readings from Last 3 Encounters:   01/20/23 127/86   12/16/22 110/60   11/18/22 104/66    Pulse Readings from Last 3 Encounters:   01/20/23 71   12/16/22 55   11/18/22 61    SpO2 Readings from Last 3 Encounters:   01/20/23 96%   12/16/22 97%   11/18/22 96%        Electronically signed by FAHAD Rushing on 1/20/2023 at 1:53 PM

## 2023-01-20 NOTE — LETTER
[facility] Sleep Medicine  [id number] [facility]  [facility] [address]  Phone: [phone]  Fax: [phone]    January 20, 2023       Patient: Igor Gr   MR Number: [id number]   YOB: 1945   Date of Visit: 1/20/2023       Tamia Dejesus was seen for a follow up visit today. Here is my assessment and plan as well as an attached copy of his visit today:    Chronic diastolic heart failure (HCC)  Chronic- Stable. Discussed the importance of treating obstructive sleep apnea as part of the management of this disorder. Cont any meds per PCP and other physicians. Mild CAD  Chronic- Stable. Discussed the importance of treating obstructive sleep apnea as part of the management of this disorder. Cont any meds per PCP and other physicians. Essential hypertension  Chronic- Stable. Discussed the importance of treating obstructive sleep apnea as part of the management of this disorder. Cont any meds per PCP and other physicians. Severe obesity (BMI 35.0-39. 9) with comorbidity ([address]  Chronic-not stable:  Discussed importance of treating obstructive sleep apnea and getting sufficient sleep to assist with weight control. Encouraged him to work on weight loss through diet and exercise. Recommended DASH or Mediterranean diets. Obstructive sleep apnea syndrome  Chronic-with progression/exacerbation: Reviewed and analyzed results of physiologic download from patient's machine and reviewed with patient. Supplies and parts as needed for his machine. These are medically necessary. Limit caffeine use after 3pm. Based on the analyzed data will continue with current settings. Encouraged him to use machine all night. Discussed more consistent use of his machine will most likely decrease nocturia. Discussed trying a mask liner to control mask leak and skin irritation. Encouraged him to work with his DME on mask fit and comfort.  Provided resources for him to view different styles of masks and mask liners. Will see him back in 3 months. Encouraged him to contact the office with any questions or concerns. Encouraged consistent use of his machine each night, all night. Discussed the importance of treating Obstructive sleep apnea from a physiological standpoint. Instructed not to drive unless had 4 hrs of effective therapy for his AUDIE the night before. Did review the risks of under or untreated AUDIE including, but not limited to, higher risks of motor vehicle accidents, stroke, heart attacks, and death. He understands and accepts all these risks. If you have questions or concerns, please do not hesitate to call me. I look forward to following Abrahan Zee along with you.     Sincerely,    FAHAD Aldana    CC providers:  Ruperto Mccarthy MD  [id number] [id number]  [address]

## 2023-02-13 ENCOUNTER — NURSE ONLY (OUTPATIENT)
Dept: CARDIOLOGY CLINIC | Age: 78
End: 2023-02-13
Payer: MEDICARE

## 2023-02-13 DIAGNOSIS — G45.9 TIA (TRANSIENT ISCHEMIC ATTACK): Primary | ICD-10-CM

## 2023-02-13 DIAGNOSIS — Z45.09 ENCOUNTER FOR ELECTRONIC ANALYSIS OF REVEAL EVENT RECORDER: ICD-10-CM

## 2023-02-13 PROCEDURE — 93298 REM INTERROG DEV EVAL SCRMS: CPT | Performed by: INTERNAL MEDICINE

## 2023-02-13 PROCEDURE — G2066 INTER DEVC REMOTE 30D: HCPCS | Performed by: INTERNAL MEDICINE

## 2023-02-13 NOTE — PROGRESS NOTES
We received a remote transmission from patient's monitor at home. Remote Linq report shows gallito recording, SR with ectopy and AF. Pt is on Eliquis. EP physician to review. We will continue to monitor remotely. Implanted for stroke. End of 31-day monitoring period 2-13-23.

## 2023-02-21 NOTE — PROGRESS NOTES
[facility]   Electrophysiology Follow up   Date: 2/22/2023  I had the privilege of visiting Heather Norris in the office. CC: PAF   HPI: Heather Norris is a 76 y.o. male  with a PMH of HTN, RBBB, HLD and Type 2 diabetes. On 7/25/19 he presented to [facility] ED with complaints of fatigue and right sided weakness. Was dx with TIA and discharged on plavix and asa with a 30 day monitor. 10/14/19 ILR insertion for cryptogenic stroke     He has a new diagnosis of severe AUDIE and uses CPAP    Interval History:  Erica Zaragoza presents to the office in follow up. His only complaint today is that he has some shortness of breath on exertion. Does not notice his heart racing. He states he does water aerobics a few times a week for an hour and does not notice symptoms. Assessment and plan:   PAF, flutter   -ECG today shows SR   -Patient has a [id number] Score of at least 7(age1, stroke2, HTN, DM, CAD), continue Eliquis 5 mg BID    -ILR implant 10/14/2019,  -0.2% AT/AF burden per device interrogation today, longest 1 hour and 20 minutes, symptomatic with ectopy  -Start lopressor 25 mg BID   His heart rate is fast during episodes of atrial fibrillation and flutter. I started him on Lopressor. We will monitor his heart rate. He might need a pacemaker if he develops significant bradycardia. ILR   -s/p implant 10/14/2019  The CIED was interrogated and programmed and I supervised and reviewed all the data.  All findings and changes are in device interrogation sheet and reflect my personal interpretation and changes and is scanned to Epic.    -0.2% AT/AF burden per device interrogation today, longest 1 hour and 20 minutes, symptomatic with ectopy   -Pauses are undersensing, gallito during sleep time, if he has worsening bradycardia we briefly discussed that he may need a PPM    Chronic Diastolic HF   -Managed by    -Echo and follow up scheduled for 5/26/2023   -Complains of ongoing FALCON   -Continue losartan and HCTZ   -Will start lopressor 25 mg BID     PVC's              -S/p PVC ablation 9/23/2020               -25 % PVC burden on MCOT 8/22/19 and frequent on Implantable Loop recorder and ECG     CAD              -20% LAD lesion   -continue risk factor modification   -Continue ASA and pravastatin                 TIA/cryptogenic               -continue ASA and eliquis              -Followed by neurology    HTN  -Controlled: 118/82  -BP goal <130/80  -Home BP monitoring encouraged, printed information provided on how to accurately measure BP at home.  -Counseled to follow a low salt diet to assure blood pressure remains controlled for cardiovascular risk factor modification.   -The patient is counseled to get regular exercise 3-5 times per week and maintain a healthy weight reduce cardiovascular risk factors.      Obesity  Body mass index is 35.88 kg/m².  -Excessive weight is complicating assessment and treatment. It is placing patient at risk for multiple co-morbidities as well as early death and contributing to the patient's presentation.  -Discussed weight management with diet and exercise      AUDIE  -Stable: Uses CPAP/Bipap/APAP  -Encourage to use machine to prevent long term effects of untreated AUDIE    Plan:   Start lopressor 25 mg BID  Follow up in November    Patient Active Problem List    Diagnosis Date Noted    Type 2 diabetes mellitus with chronic kidney disease 12/16/2022    Essential hypertension 06/16/2022    Benign prostatic hyperplasia with nocturia 06/16/2022    Paroxysmal atrial fibrillation (HCC) 06/16/2022    Chronic renal disease, stage III (HCC) [625032] 05/13/2022    Obstructive sleep apnea syndrome 01/26/2021    Obesity (BMI 30-39.9) 11/12/2020    Chronic diastolic heart failure (HCC) 07/08/2020    PVC (premature ventricular contraction) 07/06/2020    Encounter for loop recorder check 07/06/2020    Mild CAD 11/13/2019    Severe obesity (BMI 35.0-39.9) with comorbidity (HCC) 11/13/2019    TIA  (transient ischemic attack) 07/25/2019    Type 2 diabetes mellitus without complication, without long-term current use of insulin (HCC)     Insomnia 09/05/2017    RBBB 12/19/2016    Mixed hyperlipidemia 02/08/2010    Gastroesophageal reflux disease without esophagitis 02/08/2010     Diagnostic studies:   ECG 2/22/23  SR, QTcH 461,      Echo 6/8/2021  -Technically difficult study due to body habitus.  -Abnormal arrhythmia noted.  -Left ventricular cavity size is normal.  -Ejection fraction is visually estimated to be 55%. -There is abnormal septal motion.  -Wall motion abnormalities difficult to determine due to abnormal arrhythmia.  -Grade I diastolic dysfunction with normal LV filling pressures. E/e'= 14.9.  -Left atrium is dilated. -Right atrium is dilated. -The aortic valve appears sclerotic but opens well. -Mitral annular calcification is present. -Mild mitral regurgitation.  -Trivial tricuspid regurgitation. RVSP is estimated to be 21-24 mmHG. -IVC not well visualized. MCOT:  8/22-9/20/2019  Sinus rhythm with frequent PVC's  PVC burden 25%  PAC burden 3%  High   Low HR 42  Average HR 69     Echo: 7/25/2019  Summary   -Arrhythmia noted. -Normal left ventricle size and systolic function with an estimated ejection   fraction of 55%. There is concentric left ventricular hypertrophy. Grade I   diastolic dysfunction with elevated LV filling pressures. -Mitral annular calcification is present. -Mild mitral regurgitation.   -Aortic valve appears sclerotic but opens adequately. -Mild tricuspid regurgitation with PASP of 34 mmHg     Nuclear stress:  10/4/2019  Summary   No EKG evidence for ischemia with lexiscan   Normal LV size and systolic function. Myocardial perfusion imaging with small area of decreased uptake in the   inferoapical wall with stress and rest consistent with scar in the territory   typical of the RCA, Cx or LAD arteries.    No significant reversible ischemia     Cath: 10/14/2019   LM       Normal  LAD     20% mid  Cx        Normal  RCA     Normal, nondominant  LVG     Not performed  EDP     12 mmHg    I independently reviewed the cardiac diagnostic studies, ECG and relevant imaging studies. Lab Results   Component Value Date    LVEF 55 06/18/2021     Lab Results   Component Value Date    TSHFT4 1.12 06/22/2022    TSH 0.97 11/09/2020         Physical Examination:  Vitals:    02/22/23 1401   BP: 118/82   Pulse: 93   SpO2: 94%      Wt Readings from Last 3 Encounters:   02/22/23 243 lb (110.2 kg)   01/20/23 238 lb (108 kg)   12/16/22 239 lb (108.4 kg)       Constitutional: Oriented. No distress. Head: Normocephalic and atraumatic. Mouth/Throat: Oropharynx is clear and moist.   Eyes: Conjunctivae normal. EOM are normal.   Neck: Neck supple. No rigidity. No JVD present. Cardiovascular: Normal rate, regular rhythm, S1&S2. Pulmonary/Chest: Bilateral respiratory sounds. No wheezes, No rhonchi. Abdominal: Soft. Bowel sounds present. No distension, No tenderness. Musculoskeletal: No tenderness. No edema    Lymphadenopathy: Has no cervical adenopathy. Neurological: Alert and oriented. Cranial nerve appears intact, No Gross deficit   Skin: Skin is warm and dry. No rash noted. Psychiatric: Has a normal behavior       Review of System:  [x] Full ROS obtained and negative except as mentioned in HPI    Prior to Admission medications    Medication Sig Start Date End Date Taking?  Authorizing Provider   metoprolol tartrate (LOPRESSOR) 25 MG tablet Take 1 tablet by mouth 2 times daily 2/22/23  Yes Moshe Mauricio MD   apixaban (ELIQUIS) 5 MG TABS tablet Take 1 tablet by mouth 2 times daily 12/16/22  Yes FAHAD Stroud - CNP   pravastatin (PRAVACHOL) 80 MG tablet Take 1 tablet by mouth every evening 12/16/22 12/16/23 Yes Belinda Pereira MD   omeprazole (PRILOSEC) 40 MG delayed release capsule Take 1 capsule by mouth daily 12/16/22  Yes Belinda Pereira MD   ezetimibe (Reynaldo Keenan) 10 MG tablet Take 1 tablet by mouth daily 12/16/22  Yes Matthew Whitlock MD   hydroCHLOROthiazide (HYDRODIURIL) 25 MG tablet Take 1 tablet 5 days weekly on Monday, Wednesday, Thursday, Friday and Sunday 11/18/22  Yes Charmayne Big, MD   Vitamin D (CHOLECALCIFEROL) 25 MCG (1000 UT) TABS tablet Take 1,000 Units by mouth daily   Yes Historical Provider, MD   vitamin E 400 UNIT capsule Take 400 Units by mouth daily   Yes Historical Provider, MD   losartan (COZAAR) 100 MG tablet Take 1 tablet by mouth daily 5/13/22 5/13/23 Yes Charmayne Big, MD   Ascorbic Acid (VITAMIN C PO) Take 1,000 mg by mouth daily    Yes Historical Provider, MD   tamsulosin (FLOMAX) 0.4 MG capsule Take 1 capsule by mouth daily 10/30/20  Yes Scotty Stewart DO   mometasone (NASONEX) 50 MCG/ACT nasal spray 2 sprays by Each Nostril route daily  Patient taking differently: 2 sprays by Each Nostril route daily as needed 12/9/19  Yes Scotty Stewart DO   b complex vitamins capsule Take 1 capsule by mouth daily   Yes Historical Provider, MD   solifenacin (VESICARE) 10 MG tablet Take 10 mg by mouth daily    Yes Historical Provider, MD   aspirin 81 MG tablet Take 81 mg by mouth daily   Yes Historical Provider, MD   blood glucose test strips (GLUCOSE METER TEST) strip 1 each by In Vitro route daily 11/21/22   Matthew Whitlock MD   Glucosamine-Chondroitin 5129-6577 MG/30ML LIQD Take 1.5 oz by mouth daily    Historical Provider, MD   Lancets MISC 1 each by Does not apply route daily 10/30/20   Scotty Marion DO       No Known Allergies    Social History:  Reviewed. reports that he quit smoking about 45 years ago. His smoking use included cigarettes. He has a 16.00 pack-year smoking history. He has never used smokeless tobacco. He reports current alcohol use of about 4.0 standard drinks per week. He reports that he does not use drugs. Family History:  Reviewed. Reviewed. No family history of SCD.       Relevant and available labs, and cardiovascular diagnostics reviewed. Reviewed. I independently reviewed relevant and available cardiac diagnostic tests ECG, CXR, Echo, Stress test, Device interrogation, Holter, CT scan. Outside medical records via Care everywhere reviewed and summarized in H&P above. Complex medical condition with multiple medical problems affecting prognosis and outcome of EP interventions       - The patient is counseled to follow a low salt diet to assure blood pressure remains controlled for cardiovascular risk factor modification.   - The patient is counseled to avoid excess caffeine, and energy drinks as this may exacerbated ectopy and arrhythmia. - The patient is counseled to get regular exercise 3-5 times per week to control cardiovascular risk factors. - The patient is counseled to lose weigt to control cardiovascular risk factors. All questions and concerns were addressed to the patient/family. Alternatives to my treatment were discussed. I have discussed the above stated plan and the patient verbalized understanding and agreed with the plan. Scribe attestation: This note was scribed in the presence of Vladimir Villafuerte MD by Guevara Mobley RN    I, Dr. Vladimir Villafuerte personally performed the services described in this documentation as scribed by RN in my presence, and it is both accurate and complete. NOTE: This report was transcribed using voice recognition software. Every effort was made to ensure accuracy, however, inadvertent computerized transcription errors may be present.      Vladimir Villafuerte MD, [address]   Office: [phone]  Fax: [phone]

## 2023-02-22 ENCOUNTER — OFFICE VISIT (OUTPATIENT)
Dept: CARDIOLOGY CLINIC | Age: 78
End: 2023-02-22
Payer: MEDICARE

## 2023-02-22 ENCOUNTER — NURSE ONLY (OUTPATIENT)
Dept: CARDIOLOGY CLINIC | Age: 78
End: 2023-02-22

## 2023-02-22 VITALS
BODY MASS INDEX: 35.99 KG/M2 | HEART RATE: 93 BPM | SYSTOLIC BLOOD PRESSURE: 118 MMHG | DIASTOLIC BLOOD PRESSURE: 82 MMHG | HEIGHT: 69 IN | OXYGEN SATURATION: 94 % | WEIGHT: 243 LBS

## 2023-02-22 DIAGNOSIS — G45.9 TIA (TRANSIENT ISCHEMIC ATTACK): ICD-10-CM

## 2023-02-22 DIAGNOSIS — I25.10 MILD CAD: Chronic | ICD-10-CM

## 2023-02-22 DIAGNOSIS — I10 ESSENTIAL HYPERTENSION: ICD-10-CM

## 2023-02-22 DIAGNOSIS — I48.0 PAROXYSMAL ATRIAL FIBRILLATION (HCC): Primary | ICD-10-CM

## 2023-02-22 DIAGNOSIS — G47.33 OBSTRUCTIVE SLEEP APNEA SYNDROME: ICD-10-CM

## 2023-02-22 DIAGNOSIS — E66.01 SEVERE OBESITY (BMI 35.0-39.9) WITH COMORBIDITY (HCC): ICD-10-CM

## 2023-02-22 DIAGNOSIS — I49.3 PVC (PREMATURE VENTRICULAR CONTRACTION): ICD-10-CM

## 2023-02-22 PROCEDURE — 99214 OFFICE O/P EST MOD 30 MIN: CPT | Performed by: INTERNAL MEDICINE

## 2023-02-22 PROCEDURE — 1123F ACP DISCUSS/DSCN MKR DOCD: CPT | Performed by: INTERNAL MEDICINE

## 2023-02-22 PROCEDURE — G8417 CALC BMI ABV UP PARAM F/U: HCPCS | Performed by: INTERNAL MEDICINE

## 2023-02-22 PROCEDURE — G8427 DOCREV CUR MEDS BY ELIG CLIN: HCPCS | Performed by: INTERNAL MEDICINE

## 2023-02-22 PROCEDURE — 1036F TOBACCO NON-USER: CPT | Performed by: INTERNAL MEDICINE

## 2023-02-22 PROCEDURE — 3079F DIAST BP 80-89 MM HG: CPT | Performed by: INTERNAL MEDICINE

## 2023-02-22 PROCEDURE — G8484 FLU IMMUNIZE NO ADMIN: HCPCS | Performed by: INTERNAL MEDICINE

## 2023-02-22 PROCEDURE — 93000 ELECTROCARDIOGRAM COMPLETE: CPT | Performed by: INTERNAL MEDICINE

## 2023-02-22 PROCEDURE — 3074F SYST BP LT 130 MM HG: CPT | Performed by: INTERNAL MEDICINE

## 2023-02-22 NOTE — PROGRESS NOTES
ILR for stroke  Last remote 02.11.2023    Linq transmission shows 1 pause that appears undersensing, symptom recordings appearing ectopy/SR, tachy and gallito noted. AF burden 0.2%. Patient remains on Eliquis. EP physician will review. See Paceart report under the Cardiology tab. Patient will see [facility] today. We will continue to monitor remotely.

## 2023-03-20 ENCOUNTER — NURSE ONLY (OUTPATIENT)
Dept: CARDIOLOGY CLINIC | Age: 78
End: 2023-03-20
Payer: MEDICARE

## 2023-03-20 DIAGNOSIS — Z45.09 ENCOUNTER FOR ELECTRONIC ANALYSIS OF REVEAL EVENT RECORDER: ICD-10-CM

## 2023-03-20 DIAGNOSIS — G45.9 TIA (TRANSIENT ISCHEMIC ATTACK): Primary | ICD-10-CM

## 2023-03-20 PROCEDURE — G2066 INTER DEVC REMOTE 30D: HCPCS | Performed by: INTERNAL MEDICINE

## 2023-03-20 PROCEDURE — 93298 REM INTERROG DEV EVAL SCRMS: CPT | Performed by: INTERNAL MEDICINE

## 2023-03-20 NOTE — PROGRESS NOTES
We received a remote transmission from patient's monitor at home. Remote Linq report shows gallito recording, ST,SR with ectopy and AF. Pt is on Eliquis. Pt has reached the CHARMAINE. Message sent to office staff. EP physician to review. We will continue to monitor remotely. Implanted for stroke. End of 31-day monitoring period 3-20-23.

## 2023-03-30 ENCOUNTER — TELEPHONE (OUTPATIENT)
Dept: CARDIOLOGY CLINIC | Age: 78
End: 2023-03-30

## 2023-03-30 NOTE — TELEPHONE ENCOUNTER
Spoke with Marta Nicole. He is agreeable to loop recorder remove and replace. He is moving to [facility] in the next couple months and would like to have it down As soon as possible.

## 2023-03-30 NOTE — TELEPHONE ENCOUNTER
----- Message from Vladimir Villafuerte MD sent at 3/29/2023 10:52 AM EDT -----  Regarding: RE: ILR  yes  ----- Message -----  From: Jia Finch RN  Sent: 3/20/2023  10:45 AM EDT  To: Vladimir Villafuerte MD  Subject: ILR                                              Replace?  ----- Message -----  From: Douglas Gabriel  Sent: 3/20/2023  10:04 AM EDT  To: Jia Finch RN    FYI.  Loop is CHARMAINE

## 2023-03-30 NOTE — LETTER
[address]  EP Procedure Sheet    3/30/23  Tami Pisano  1945  [id number]  EP Procedures  [] Pacemaker implant (single/dual) [] EP Study   [] ICD implant (single/dual) [] Atrial flutter ablation (GUI Y/N)   [] Biv implant ICD [] Tilt Table   [] Biv implant PPM [] Atrial fibrillation ablation (GUI Yes)   [] Generator Change (PPM/ICD/BiV) [] SVT ablation   [] Lead revision (RV/LA/RA) (<1 month) [] PVC ablation     [] Lead extraction +/- upgrade (BiV/PPM/ICD) [] VT Ischemic/ non-ischemic   [x] Loop remove and replace  [] VT RVOT   [] Cardioversion [] VT Left sided   [] GUI [] AVN ablation   Equipment  [x] Medtronic  [] FUNMILAYO Mapping System   [] St. Yahir [] Carto Mapping System   [] [employer] [] CryoAblation   [] Biotronik [] Laser Lead Extraction   EP Procedures Scheduling Request  # hours Requested  []1 []2 []2-4 [] 4-6 Scheduled  Date:   Specific Day  Completed    Anesthesia []yes [x]no F/u Date:   CT surgery backup []yes [x]no     Overnight stay      Performing MD []RMM [x]MXA   []MKW [] CMV First vs repeat   []1st [] 2nd [] 3rd   Pre-Procedure Labs / Imaging  [] PT/INR [] Type & cross   [] CBC [] Units PRBC   [] BMP/Mg [] Units FFP   [] Venogram [] Cardiac CTA for Pulmonary vein mapping     RN INITIALS:   dw   Patient Instructions  Do not eat or drink after midnight the night prior to procedure  Dx: loop recorder battery depleted   ICD-10 code:  Z45.09

## 2023-04-17 ENCOUNTER — HOSPITAL ENCOUNTER (OUTPATIENT)
Dept: CARDIAC CATH/INVASIVE PROCEDURES | Age: 78
Discharge: HOME | End: 2023-04-17
Attending: INTERNAL MEDICINE | Admitting: INTERNAL MEDICINE
Payer: MEDICARE

## 2023-04-17 VITALS
HEART RATE: 52 BPM | RESPIRATION RATE: 16 BRPM | DIASTOLIC BLOOD PRESSURE: 81 MMHG | TEMPERATURE: 98.4 F | SYSTOLIC BLOOD PRESSURE: 145 MMHG

## 2023-04-17 PROBLEM — Z95.818 IMPLANTABLE LOOP RECORDER PRESENT: Status: ACTIVE | Noted: 2020-07-06

## 2023-04-17 PROCEDURE — 33285 INSJ SUBQ CAR RHYTHM MNTR: CPT

## 2023-04-17 PROCEDURE — 33286 RMVL SUBQ CAR RHYTHM MNTR: CPT | Performed by: INTERNAL MEDICINE

## 2023-04-17 PROCEDURE — 33285 INSJ SUBQ CAR RHYTHM MNTR: CPT | Performed by: INTERNAL MEDICINE

## 2023-04-17 PROCEDURE — C1764 EVENT RECORDER, CARDIAC: HCPCS

## 2023-04-17 PROCEDURE — 33286 RMVL SUBQ CAR RHYTHM MNTR: CPT

## 2023-04-17 NOTE — DISCHARGE INSTRUCTIONS
DISCHARGE INSTRUCTIONS        Leave outer dressing on until follow up appointment, if dressing becomes wet or soiled, remove dressing prior to follow up appointment. Leave steri strips intact. Do not get the incision wet for one week. You may be sore, bruised and have a small amount of drainage around the incision site.     Please contact the office if you notice any signs of infection:  Redness / swelling at the incision site  Fever  Yellow drainage at the site  Pain     Phone: [phone]

## 2023-04-17 NOTE — H&P
ischemic attack) 07/25/2019    Type 2 diabetes mellitus without complication, without long-term current use of insulin (HCC)     Insomnia 09/05/2017    RBBB 12/19/2016    Mixed hyperlipidemia 02/08/2010    Gastroesophageal reflux disease without esophagitis 02/08/2010     Diagnostic studies:   ECG 2/22/23  SR, QTcH 461,      Echo 6/8/2021  -Technically difficult study due to body habitus.  -Abnormal arrhythmia noted.  -Left ventricular cavity size is normal.  -Ejection fraction is visually estimated to be 55%. -There is abnormal septal motion.  -Wall motion abnormalities difficult to determine due to abnormal arrhythmia.  -Grade I diastolic dysfunction with normal LV filling pressures. E/e'= 14.9.  -Left atrium is dilated. -Right atrium is dilated. -The aortic valve appears sclerotic but opens well. -Mitral annular calcification is present. -Mild mitral regurgitation.  -Trivial tricuspid regurgitation. RVSP is estimated to be 21-24 mmHG. -IVC not well visualized. MCOT:  8/22-9/20/2019  Sinus rhythm with frequent PVC's  PVC burden 25%  PAC burden 3%  High   Low HR 42  Average HR 69     Echo: 7/25/2019  Summary   -Arrhythmia noted. -Normal left ventricle size and systolic function with an estimated ejection   fraction of 55%. There is concentric left ventricular hypertrophy. Grade I   diastolic dysfunction with elevated LV filling pressures. -Mitral annular calcification is present. -Mild mitral regurgitation.   -Aortic valve appears sclerotic but opens adequately. -Mild tricuspid regurgitation with PASP of 34 mmHg     Nuclear stress:  10/4/2019  Summary   No EKG evidence for ischemia with lexiscan   Normal LV size and systolic function. Myocardial perfusion imaging with small area of decreased uptake in the   inferoapical wall with stress and rest consistent with scar in the territory   typical of the RCA, Cx or LAD arteries.    No significant reversible ischemia     Cath: 10/14/2019

## 2023-04-17 NOTE — PROCEDURES
[address]     Electrophysiology Procedure Note       Date of Procedure: 4/17/2023  Patient's Name: Jaun Saeed  YOB: 1945   Medical Record Number: [id number]  Procedure Performed by: Candido Knowles MD    Procedures performed:    Loop recorder removal  Loop recorder implantation      Indication of the procedure:    Jaun Saeed is a 76 y.o. male with   paroxysmal atrial fibrillation, bradycardia  ILR at [facility]    Details of procedure:   Procedure's risks, benefits and alternatives of procedure were explained to patient. All questions were answered. Patient understood and informed consent was obtained. The patient was brought to the electrophysiology lab in a fasting nonsedated state. Patient was prepped and draped in usual sterile fashion. After injection of 2% lidocaine in the left 4th intercostal area, a 5 mm small incision was made over the old one and device was removed . Using ILR insertion device,   the loop recorder was inserted under skin. The skin was covered with Steri-Strips. Blood loss<5 cc  No complications. Device information:   The device is Reveal LINQ ll SN# [id number] Medtronic      Implant date: 4/17/2023  R wave 0.22mv  The device was programmed to detect:   VT: 380 ms 16 beats   Bradycardia: 2000 ms     Plan:   The patient will have usual post-implant care. Patient can be discharged home if remains stable with follow up in arrhythmia clinic.

## 2023-04-24 ENCOUNTER — NURSE ONLY (OUTPATIENT)
Dept: CARDIOLOGY CLINIC | Age: 78
End: 2023-04-24

## 2023-04-24 ENCOUNTER — TELEPHONE (OUTPATIENT)
Dept: CARDIOLOGY CLINIC | Age: 78
End: 2023-04-24

## 2023-04-24 NOTE — PROGRESS NOTES
Patient comes in for their 1 week wound check S/p mdtlinq explant/mdtlinq II Implant on 4/17/2023. Patients incision is healing nicely. Patient and family educated on wound care. All questions answered. Patient to call the office immediately with any signs on infection. Carelink Interrogation shows a Battery Status GOOD. No episodes noted since implant. 5.8% PVC burden. Implanted for AF management. Patient remains Eliquis and metoprolol. Turned on symptom recordings today and gave Patient a Symptom Activator. Reviewed when/how to use. Turned AF episodes to All. Please see interrogation for more detail. Patient will followed in office as scheduled on 11/29/2023 with Dr. Garrcik Barroso and we will continue to follow the Patient remotely.

## 2023-05-15 ENCOUNTER — NURSE ONLY (OUTPATIENT)
Dept: CARDIOLOGY CLINIC | Age: 78
End: 2023-05-15
Payer: MEDICARE

## 2023-05-15 DIAGNOSIS — G45.9 TIA (TRANSIENT ISCHEMIC ATTACK): Primary | ICD-10-CM

## 2023-05-15 DIAGNOSIS — Z45.09 ENCOUNTER FOR ELECTRONIC ANALYSIS OF REVEAL EVENT RECORDER: ICD-10-CM

## 2023-05-15 PROCEDURE — 93298 REM INTERROG DEV EVAL SCRMS: CPT | Performed by: INTERNAL MEDICINE

## 2023-05-15 PROCEDURE — G2066 INTER DEVC REMOTE 30D: HCPCS | Performed by: INTERNAL MEDICINE

## 2023-05-15 NOTE — PROGRESS NOTES
We received a remote transmission from patient's monitor at home. Remote Linq report shows SR with ectopy. AF is not real.  EP physician to review. We will continue to monitor remotely. Implanted for stroke. End of 31-day monitoring period 5-15-23.

## 2023-06-01 ENCOUNTER — TELEPHONE (OUTPATIENT)
Dept: CARDIOLOGY CLINIC | Age: 78
End: 2023-06-01

## 2023-06-01 DIAGNOSIS — I10 BENIGN ESSENTIAL HTN: ICD-10-CM

## 2023-06-01 RX ORDER — LOSARTAN POTASSIUM 100 MG/1
100 TABLET ORAL DAILY
Qty: 90 TABLET | Refills: 3 | Status: SHIPPED | OUTPATIENT
Start: 2023-06-01 | End: 2024-05-31

## 2023-06-01 NOTE — TELEPHONE ENCOUNTER
Medication Refill    Medication needing refilled:  losartan (COZAAR)     Dosage of the medication:  100mg    How are you taking this medication (QD, BID, TID, QID, PRN):  Take 1 tablet by mouth daily    30 or 90 day supply called in:  90    When will you run out of your medication:    Which Pharmacy are we sending the medication to?:    [facility]   [address] [facility] [id number] [place] [address]   Phone:  [phone]  Fax:   [phone]

## 2023-06-05 ENCOUNTER — TELEPHONE (OUTPATIENT)
Dept: CARDIOLOGY CLINIC | Age: 78
End: 2023-06-05

## 2023-06-05 DIAGNOSIS — I48.0 PAF (PAROXYSMAL ATRIAL FIBRILLATION) (HCC): ICD-10-CM

## 2023-06-05 DIAGNOSIS — I50.32 CHRONIC DIASTOLIC HEART FAILURE (HCC): Primary | Chronic | ICD-10-CM

## 2023-06-05 NOTE — TELEPHONE ENCOUNTER
Pt's wife called in stating that she needed to give the info for the Cardiologist they found up in [place] so that [facility] could send over a referral to the fax number listed below.      Daniel Dinh MD   P. [phone]  F. [phone]

## 2023-06-13 NOTE — TELEPHONE ENCOUNTER
Nikko New, wife called to inform the office that the [address] [place] [address] office has not received the referral.  The referring Doctor is:    Brenna De Luna, [address] [place] [address], [address]  Phone:  [phone]  Fax:   [phone]    Please re-send the referral.  Thank you

## 2023-06-13 NOTE — TELEPHONE ENCOUNTER
Ghada Pollack with  Dr Mildred Rosas office is requesting pt's Ins info, Demograhics, Last office note with vitals and EKG tracing. Please fax to  [phone].  Any questions                    ph.[phone]

## 2023-06-19 ENCOUNTER — NURSE ONLY (OUTPATIENT)
Dept: CARDIOLOGY CLINIC | Age: 78
End: 2023-06-19
Payer: MEDICARE

## 2023-06-19 DIAGNOSIS — Z45.09 ENCOUNTER FOR ELECTRONIC ANALYSIS OF REVEAL EVENT RECORDER: ICD-10-CM

## 2023-06-19 DIAGNOSIS — G45.9 TIA (TRANSIENT ISCHEMIC ATTACK): Primary | ICD-10-CM

## 2023-06-19 PROCEDURE — 93298 REM INTERROG DEV EVAL SCRMS: CPT | Performed by: INTERNAL MEDICINE

## 2023-06-19 PROCEDURE — G2066 INTER DEVC REMOTE 30D: HCPCS | Performed by: INTERNAL MEDICINE

## 2023-06-23 NOTE — RESULT ENCOUNTER NOTE
Sinus with ectopy  Reviewed. Continue monitoring.     Allison Lewis MD [facility]

## 2023-07-13 ENCOUNTER — TELEPHONE (OUTPATIENT)
Dept: CARDIOLOGY CLINIC | Age: 78
End: 2023-07-13

## 2023-07-13 NOTE — TELEPHONE ENCOUNTER
CARDIAC CLEARANCE     What type of procedure are you having? Total knee  Which physician is performing your procedure? Dr Matti Bobby    When is your procedure scheduled for?  8/9/23  Where are you having this procedure? [facility]    Are you taking Blood Thinners? Eliquis    If so what? (Name/dose/frequesncy)     Does the surgeon want you to stop your blood thinner? If so for how long?  3 days prior     Phone Number and Contact Name for Physicians office:    Fax number to send information: [phone]     For pacemaker asking for any magnet recommendations

## 2023-07-13 NOTE — TELEPHONE ENCOUNTER
Patient is following cardiology in [place] so we have not adjusted medications they need to contact that provider

## 2023-07-13 NOTE — TELEPHONE ENCOUNTER
Wife states pt was told to stop some medication and can not recall what they were. Asking for a call back to advise.

## 2023-07-13 NOTE — TELEPHONE ENCOUNTER
Spoke with the patient and wife, they requested to review medication list on file for comparison with theirs.      Call complete

## 2023-07-24 ENCOUNTER — NURSE ONLY (OUTPATIENT)
Dept: CARDIOLOGY CLINIC | Age: 78
End: 2023-07-24
Payer: MEDICARE

## 2023-07-24 DIAGNOSIS — I48.0 PAF (PAROXYSMAL ATRIAL FIBRILLATION) (HCC): Primary | ICD-10-CM

## 2023-07-24 DIAGNOSIS — Z45.09 ENCOUNTER FOR ELECTRONIC ANALYSIS OF REVEAL EVENT RECORDER: ICD-10-CM

## 2023-07-24 PROCEDURE — G2066 INTER DEVC REMOTE 30D: HCPCS | Performed by: INTERNAL MEDICINE

## 2023-07-24 PROCEDURE — 93298 REM INTERROG DEV EVAL SCRMS: CPT | Performed by: INTERNAL MEDICINE

## 2023-07-24 NOTE — PROGRESS NOTES
We received a remote transmission from patient's monitor at home. Remote Linq report shows AF and SR with ectopy. Pt is on Eliquis. EP physician to review. We will continue to monitor remotely. Implanted for stroke. End of 31-day monitoring period 7-24-23.

## 2023-08-01 ENCOUNTER — TELEPHONE (OUTPATIENT)
Dept: CARDIOLOGY CLINIC | Age: 78
End: 2023-08-01

## 2023-08-01 NOTE — TELEPHONE ENCOUNTER
CARDIAC CLEARANCE     What type of procedure are you having? Total knee   Which physician is performing your procedure? Dr Evaline Simmonds   When is your procedure scheduled for?  8/9  Where are you having this procedure? [facility]   Are you taking Blood Thinners? If so what? (Name/dose/frequesncy)  Elequis 5 mg    Does the surgeon want you to stop your blood thinner? If so for how long?   3 days   Phone Number and Contact Name for Physicians office:  [phone]  Fax number to send information:  [phone]

## 2023-08-08 ENCOUNTER — TELEPHONE (OUTPATIENT)
Dept: INTERNAL MEDICINE CLINIC | Age: 78
End: 2023-08-08

## 2023-08-08 NOTE — TELEPHONE ENCOUNTER
----- Message from Garry Juárez sent at 8/8/2023 10:20 AM EDT -----  Subject: Message to Provider    QUESTIONS  Information for Provider? 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 He will call to get records once he   gets a new PCP.   ---------------------------------------------------------------------------  --------------  Myrtle HANCOCK  [phone]; OK to leave message on voicemail  ---------------------------------------------------------------------------  --------------  SCRIPT ANSWERS  Relationship to Patient?  Self

## 2023-08-09 ENCOUNTER — TELEPHONE (OUTPATIENT)
Dept: PULMONOLOGY | Age: 78
End: 2023-08-09

## 2023-08-09 NOTE — TELEPHONE ENCOUNTER
Pt called in stating that his machine is producing mold. Advised pt to call [employer] [facility]. Pt to call [employer].

## 2023-08-15 ENCOUNTER — TELEPHONE (OUTPATIENT)
Dept: CARDIOLOGY CLINIC | Age: 78
End: 2023-08-15

## 2023-08-15 NOTE — TELEPHONE ENCOUNTER
Grecia called to request the Pt medical records from [facility] and [facility],  any test, Echo etc.  Please fax records to:    [facility]  Dr. Jessica Sandhu  [address] [place] [address]  Phone: [phone]  Fax:  [phone]    Pt 1st ov is on 08/23. Please advise.   Thank you

## 2023-08-28 PROCEDURE — 93298 REM INTERROG DEV EVAL SCRMS: CPT | Performed by: INTERNAL MEDICINE

## 2023-08-28 PROCEDURE — G2066 INTER DEVC REMOTE 30D: HCPCS | Performed by: INTERNAL MEDICINE

## 2023-11-27 ENCOUNTER — TELEPHONE (OUTPATIENT)
Dept: CARDIOLOGY CLINIC | Age: 78
End: 2023-11-27

## 2024-01-12 ENCOUNTER — TELEPHONE (OUTPATIENT)
Dept: INTERNAL MEDICINE CLINIC | Age: 79
End: 2024-01-12

## 2024-01-12 NOTE — TELEPHONE ENCOUNTER
Patient has not been seen since 12/16/2022--was calling to schedule appointment or see if he found another PCP due to him moving. If patient still wants to see [facility], please schedule AWV.

## 2024-02-29 NOTE — TELEPHONE ENCOUNTER
Called and left  for patient with PSA results and advised that Dr. Ford will be reviewing results in greater detail with him at upcoming appointment next week.    Elfegoralph Barahona and his wife stopped by the office to let us know they are going to be moving out of the area and wanted to say thanks for everything.

## 2025-08-04 ENCOUNTER — TELEPHONE (OUTPATIENT)
Dept: CARDIOLOGY CLINIC | Age: 80
End: 2025-08-04

## (undated) DEVICE — BW-412T DISP COMBO CLEANING BRUSH: Brand: SINGLE USE COMBINATION CLEANING BRUSH

## (undated) DEVICE — Z DUP USE 2522782 SOLUTION IRRIG 1000ML STRL H2O PLAS CONTAINER UROMATIC

## (undated) DEVICE — GOWN AURORA NONREINF LG: Brand: MEDLINE INDUSTRIES, INC.

## (undated) DEVICE — PROCEDURE KIT ENDOSCP CUST

## (undated) DEVICE — Device: Brand: DISPOSABLE ELECTROSURGICAL SNARE

## (undated) DEVICE — URETERAL ACCESS SHEATH SET: Brand: NAVIGATOR HD

## (undated) DEVICE — BAG DRNGE L6FT 20L PREFIL ABSRB POLYMER EXP TBNG DISP FOR

## (undated) DEVICE — TRAP SPEC RETRV CLR PLAS POLYP IN LN SUCT QUIK CTCH

## (undated) DEVICE — SOLUTION IV IRRIG WATER 1000ML POUR BRL 2F7114

## (undated) DEVICE — SYRINGE MED 10ML SLIP TIP BLNT FILL AND LUERLOCK DISP

## (undated) DEVICE — Y-TYPE TUR/BLADDER IRRIGATION SET, REGULATING CLAMP

## (undated) DEVICE — GUIDEWIRE ENDOSCP L150CM DIA0.035IN TIP 3CM PTFE NIT

## (undated) DEVICE — GLOVE ORANGE PI 7   MSG9070

## (undated) DEVICE — 3M™ STERI-STRIP™ REINFORCED ADHESIVE SKIN CLOSURES, R1547, 1/2 IN X 4 IN (12 MM X 100 MM), 6 STRIPS/ENVELOPE: Brand: 3M™ STERI-STRIP™

## (undated) DEVICE — TRAY PREP DRY W/ PREM GLV 2 APPL 6 SPNG 2 UNDPD 1 OVERWRAP

## (undated) DEVICE — NITINOL STONE RETRIEVAL BASKET: Brand: ZERO TIP

## (undated) DEVICE — KIT OR ROOM TURNOVER W/STRAP

## (undated) DEVICE — PEEL-AWAY, INTRODUCER: Brand: PEEL-AWAY

## (undated) DEVICE — 60 ML SYRINGE,REGULAR TIP: Brand: MONOJECT

## (undated) DEVICE — Z DISCONTINUED NO SUB IDED BF FIBER LSR DIA365UM HOLM 2 WVLNGTH DEL SYS REUSE SLM LN

## (undated) DEVICE — CYSTO PACK: Brand: MEDLINE INDUSTRIES, INC.

## (undated) DEVICE — Device: Brand: MEDEX

## (undated) DEVICE — SOLUTION IV IRRIG WATER 500ML POUR BRL ST 2F7113

## (undated) DEVICE — MASTISOL ADHESIVE LIQ 2/3ML

## (undated) DEVICE — SET VLV 3 PC AWS DISPOSABLE GRDIAN SCOPEVALET

## (undated) DEVICE — CYSTO/BLADDER IRRIGATION SET, REGULATING CLAMP

## (undated) DEVICE — GLOVE SURG 7 LTX STRL TRIFLEX LNG FINGER